# Patient Record
Sex: FEMALE | Race: WHITE | Employment: UNEMPLOYED | URBAN - METROPOLITAN AREA
[De-identification: names, ages, dates, MRNs, and addresses within clinical notes are randomized per-mention and may not be internally consistent; named-entity substitution may affect disease eponyms.]

---

## 2019-04-30 PROBLEM — E66.01 MORBID OBESITY (HCC): Status: ACTIVE | Noted: 2019-04-30

## 2021-08-20 ENCOUNTER — APPOINTMENT (OUTPATIENT)
Dept: CT IMAGING | Age: 56
End: 2021-08-20
Payer: MEDICARE

## 2021-08-20 ENCOUNTER — APPOINTMENT (OUTPATIENT)
Dept: GENERAL RADIOLOGY | Age: 56
End: 2021-08-20
Payer: MEDICARE

## 2021-08-20 ENCOUNTER — HOSPITAL ENCOUNTER (EMERGENCY)
Age: 56
Discharge: ANOTHER HOSPITAL | End: 2021-08-21
Attending: EMERGENCY MEDICINE | Admitting: INTERNAL MEDICINE
Payer: MEDICARE

## 2021-08-20 DIAGNOSIS — G93.89: Primary | ICD-10-CM

## 2021-08-20 DIAGNOSIS — R47.01 EXPRESSIVE APHASIA: ICD-10-CM

## 2021-08-20 PROBLEM — R41.82 AMS (ALTERED MENTAL STATUS): Status: ACTIVE | Noted: 2021-08-20

## 2021-08-20 PROBLEM — I61.9 BRAIN BLEED (HCC): Status: ACTIVE | Noted: 2021-08-20

## 2021-08-20 PROBLEM — E66.01 MORBID OBESITY WITH BMI OF 50.0-59.9, ADULT (HCC): Status: ACTIVE | Noted: 2021-08-20

## 2021-08-20 PROBLEM — I10 ESSENTIAL HYPERTENSION: Status: ACTIVE | Noted: 2021-08-20

## 2021-08-20 PROBLEM — F32.A DEPRESSION: Status: ACTIVE | Noted: 2021-08-20

## 2021-08-20 PROBLEM — D72.829 LEUKOCYTOSIS: Status: ACTIVE | Noted: 2021-08-20

## 2021-08-20 LAB
ALBUMIN SERPL-MCNC: 4.1 G/DL (ref 3.5–5.2)
ALP BLD-CCNC: 43 U/L (ref 35–104)
ALT SERPL-CCNC: 31 U/L (ref 0–32)
ANION GAP SERPL CALCULATED.3IONS-SCNC: 9 MMOL/L (ref 7–16)
AST SERPL-CCNC: 34 U/L (ref 0–31)
BASOPHILS ABSOLUTE: 0.03 E9/L (ref 0–0.2)
BASOPHILS RELATIVE PERCENT: 0.2 % (ref 0–2)
BILIRUB SERPL-MCNC: 0.3 MG/DL (ref 0–1.2)
BUN BLDV-MCNC: 11 MG/DL (ref 6–20)
CALCIUM SERPL-MCNC: 9.9 MG/DL (ref 8.6–10.2)
CHLORIDE BLD-SCNC: 98 MMOL/L (ref 98–107)
CHP ED QC CHECK: YES
CO2: 23 MMOL/L (ref 22–29)
CREAT SERPL-MCNC: 0.6 MG/DL (ref 0.5–1)
EOSINOPHILS ABSOLUTE: 0 E9/L (ref 0.05–0.5)
EOSINOPHILS RELATIVE PERCENT: 0 % (ref 0–6)
GFR AFRICAN AMERICAN: >60
GFR NON-AFRICAN AMERICAN: >60 ML/MIN/1.73
GLUCOSE BLD-MCNC: 137 MG/DL (ref 74–99)
GLUCOSE BLD-MCNC: 138 MG/DL
HCT VFR BLD CALC: 45.4 % (ref 34–48)
HEMOGLOBIN: 14.2 G/DL (ref 11.5–15.5)
IMMATURE GRANULOCYTES #: 0.13 E9/L
IMMATURE GRANULOCYTES %: 1 % (ref 0–5)
LACTIC ACID, SEPSIS: 1.5 MMOL/L (ref 0.5–1.9)
LYMPHOCYTES ABSOLUTE: 1.06 E9/L (ref 1.5–4)
LYMPHOCYTES RELATIVE PERCENT: 7.8 % (ref 20–42)
MCH RBC QN AUTO: 26.6 PG (ref 26–35)
MCHC RBC AUTO-ENTMCNC: 31.3 % (ref 32–34.5)
MCV RBC AUTO: 85.2 FL (ref 80–99.9)
METER GLUCOSE: 138 MG/DL (ref 74–99)
MONOCYTES ABSOLUTE: 0.26 E9/L (ref 0.1–0.95)
MONOCYTES RELATIVE PERCENT: 1.9 % (ref 2–12)
NEUTROPHILS ABSOLUTE: 12.19 E9/L (ref 1.8–7.3)
NEUTROPHILS RELATIVE PERCENT: 89.1 % (ref 43–80)
PDW BLD-RTO: 14.3 FL (ref 11.5–15)
PLATELET # BLD: 240 E9/L (ref 130–450)
PMV BLD AUTO: 10.9 FL (ref 7–12)
POTASSIUM REFLEX MAGNESIUM: 5.4 MMOL/L (ref 3.5–5)
POTASSIUM SERPL-SCNC: 5.2 MMOL/L (ref 3.5–5)
RBC # BLD: 5.33 E12/L (ref 3.5–5.5)
REASON FOR REJECTION: NORMAL
REJECTED TEST: NORMAL
SODIUM BLD-SCNC: 130 MMOL/L (ref 132–146)
TOTAL PROTEIN: 8.4 G/DL (ref 6.4–8.3)
WBC # BLD: 13.7 E9/L (ref 4.5–11.5)

## 2021-08-20 PROCEDURE — 80178 ASSAY OF LITHIUM: CPT

## 2021-08-20 PROCEDURE — 96365 THER/PROPH/DIAG IV INF INIT: CPT

## 2021-08-20 PROCEDURE — 6360000002 HC RX W HCPCS: Performed by: STUDENT IN AN ORGANIZED HEALTH CARE EDUCATION/TRAINING PROGRAM

## 2021-08-20 PROCEDURE — 93005 ELECTROCARDIOGRAM TRACING: CPT | Performed by: STUDENT IN AN ORGANIZED HEALTH CARE EDUCATION/TRAINING PROGRAM

## 2021-08-20 PROCEDURE — 83605 ASSAY OF LACTIC ACID: CPT

## 2021-08-20 PROCEDURE — 82962 GLUCOSE BLOOD TEST: CPT

## 2021-08-20 PROCEDURE — 96375 TX/PRO/DX INJ NEW DRUG ADDON: CPT

## 2021-08-20 PROCEDURE — 70450 CT HEAD/BRAIN W/O DYE: CPT

## 2021-08-20 PROCEDURE — 84132 ASSAY OF SERUM POTASSIUM: CPT

## 2021-08-20 PROCEDURE — 71046 X-RAY EXAM CHEST 2 VIEWS: CPT

## 2021-08-20 PROCEDURE — 99284 EMERGENCY DEPT VISIT MOD MDM: CPT

## 2021-08-20 PROCEDURE — 87040 BLOOD CULTURE FOR BACTERIA: CPT

## 2021-08-20 PROCEDURE — 80053 COMPREHEN METABOLIC PANEL: CPT

## 2021-08-20 PROCEDURE — 85025 COMPLETE CBC W/AUTO DIFF WBC: CPT

## 2021-08-20 RX ORDER — LEVETIRACETAM 15 MG/ML
1500 INJECTION INTRAVASCULAR ONCE
Status: COMPLETED | OUTPATIENT
Start: 2021-08-20 | End: 2021-08-20

## 2021-08-20 RX ORDER — DEXAMETHASONE SODIUM PHOSPHATE 10 MG/ML
10 INJECTION, SOLUTION INTRAMUSCULAR; INTRAVENOUS ONCE
Status: COMPLETED | OUTPATIENT
Start: 2021-08-20 | End: 2021-08-20

## 2021-08-20 RX ORDER — LEVETIRACETAM 15 MG/ML
1500 INJECTION INTRAVASCULAR EVERY 12 HOURS
Status: DISCONTINUED | OUTPATIENT
Start: 2021-08-21 | End: 2021-08-20

## 2021-08-20 RX ORDER — DEXAMETHASONE SODIUM PHOSPHATE 10 MG/ML
6 INJECTION, SOLUTION INTRAMUSCULAR; INTRAVENOUS EVERY 4 HOURS
Status: DISCONTINUED | OUTPATIENT
Start: 2021-08-20 | End: 2021-08-20

## 2021-08-20 RX ADMIN — LEVETIRACETAM 1500 MG: 15 INJECTION INTRAVENOUS at 23:24

## 2021-08-20 RX ADMIN — DEXAMETHASONE SODIUM PHOSPHATE 10 MG: 10 INJECTION, SOLUTION INTRAMUSCULAR; INTRAVENOUS at 23:08

## 2021-08-21 ENCOUNTER — HOSPITAL ENCOUNTER (INPATIENT)
Age: 56
LOS: 10 days | Discharge: HOSPICE | DRG: 025 | End: 2021-08-31
Attending: INTERNAL MEDICINE | Admitting: INTERNAL MEDICINE
Payer: MEDICARE

## 2021-08-21 VITALS
TEMPERATURE: 97.9 F | RESPIRATION RATE: 16 BRPM | OXYGEN SATURATION: 95 % | DIASTOLIC BLOOD PRESSURE: 78 MMHG | HEART RATE: 85 BPM | HEIGHT: 64 IN | WEIGHT: 293 LBS | BODY MASS INDEX: 50.02 KG/M2 | SYSTOLIC BLOOD PRESSURE: 142 MMHG

## 2021-08-21 PROBLEM — E87.1 HYPONATREMIA: Status: ACTIVE | Noted: 2021-08-21

## 2021-08-21 LAB
ANION GAP SERPL CALCULATED.3IONS-SCNC: 10 MMOL/L (ref 7–16)
B.E.: -3.6 MMOL/L (ref -3–3)
BACTERIA: ABNORMAL /HPF
BILIRUBIN URINE: NEGATIVE
BLOOD, URINE: ABNORMAL
BUN BLDV-MCNC: 9 MG/DL (ref 6–20)
CALCIUM IONIZED: 1.3 MMOL/L (ref 1.15–1.33)
CALCIUM SERPL-MCNC: 9.7 MG/DL (ref 8.6–10.2)
CHLORIDE BLD-SCNC: 99 MMOL/L (ref 98–107)
CLARITY: CLEAR
CO2: 25 MMOL/L (ref 22–29)
COHB: 1.4 % (ref 0–1.5)
COLOR: YELLOW
CREAT SERPL-MCNC: 0.5 MG/DL (ref 0.5–1)
CRITICAL: ABNORMAL
DATE ANALYZED: ABNORMAL
DATE OF COLLECTION: ABNORMAL
EKG ATRIAL RATE: 96 BPM
EKG P AXIS: 67 DEGREES
EKG P-R INTERVAL: 178 MS
EKG Q-T INTERVAL: 366 MS
EKG QRS DURATION: 94 MS
EKG QTC CALCULATION (BAZETT): 462 MS
EKG R AXIS: -14 DEGREES
EKG T AXIS: 37 DEGREES
EKG VENTRICULAR RATE: 96 BPM
EPITHELIAL CELLS, UA: ABNORMAL /HPF
GFR AFRICAN AMERICAN: >60
GFR NON-AFRICAN AMERICAN: >60 ML/MIN/1.73
GLUCOSE BLD-MCNC: 176 MG/DL (ref 74–99)
GLUCOSE URINE: NEGATIVE MG/DL
HCO3: 21.9 MMOL/L (ref 22–26)
HCT VFR BLD CALC: 45.4 % (ref 34–48)
HEMOGLOBIN: 14.3 G/DL (ref 11.5–15.5)
HHB: 4 % (ref 0–5)
KETONES, URINE: NEGATIVE MG/DL
LAB: ABNORMAL
LEUKOCYTE ESTERASE, URINE: NEGATIVE
LITHIUM DOSE AMOUNT: ABNORMAL
LITHIUM LEVEL: 0.38 MMOL/L (ref 0.5–1.5)
Lab: ABNORMAL
MAGNESIUM: 2.1 MG/DL (ref 1.6–2.6)
MCH RBC QN AUTO: 26.6 PG (ref 26–35)
MCHC RBC AUTO-ENTMCNC: 31.5 % (ref 32–34.5)
MCV RBC AUTO: 84.4 FL (ref 80–99.9)
METER GLUCOSE: 145 MG/DL (ref 74–99)
METER GLUCOSE: 153 MG/DL (ref 74–99)
METER GLUCOSE: 154 MG/DL (ref 74–99)
METHB: 0.3 % (ref 0–1.5)
MODE: ABNORMAL
NITRITE, URINE: NEGATIVE
O2 SATURATION: 95.9 % (ref 92–98.5)
O2HB: 94.3 % (ref 94–97)
OPERATOR ID: 7278
PATIENT TEMP: 37 C
PCO2: 41.2 MMHG (ref 35–45)
PDW BLD-RTO: 14.3 FL (ref 11.5–15)
PH BLOOD GAS: 7.34 (ref 7.35–7.45)
PH UA: 6.5 (ref 5–9)
PHOSPHORUS: 3.3 MG/DL (ref 2.5–4.5)
PLATELET # BLD: 255 E9/L (ref 130–450)
PMV BLD AUTO: 10.9 FL (ref 7–12)
PO2: 85 MMHG (ref 75–100)
POTASSIUM REFLEX MAGNESIUM: 4.7 MMOL/L (ref 3.5–5)
PROTEIN UA: NEGATIVE MG/DL
RBC # BLD: 5.38 E12/L (ref 3.5–5.5)
RBC UA: ABNORMAL /HPF (ref 0–2)
REASON FOR REJECTION: NORMAL
REASON FOR REJECTION: NORMAL
REJECTED TEST: NORMAL
REJECTED TEST: NORMAL
SODIUM BLD-SCNC: 134 MMOL/L (ref 132–146)
SOURCE, BLOOD GAS: ABNORMAL
SPECIFIC GRAVITY UA: 1.02 (ref 1–1.03)
THB: 15.3 G/DL (ref 11.5–16.5)
TIME ANALYZED: 436
UROBILINOGEN, URINE: 0.2 E.U./DL
WBC # BLD: 12.2 E9/L (ref 4.5–11.5)
WBC UA: ABNORMAL /HPF (ref 0–5)

## 2021-08-21 PROCEDURE — 2580000003 HC RX 258: Performed by: SURGERY

## 2021-08-21 PROCEDURE — 87088 URINE BACTERIA CULTURE: CPT

## 2021-08-21 PROCEDURE — APPSS30 APP SPLIT SHARED TIME 16-30 MINUTES: Performed by: NURSE PRACTITIONER

## 2021-08-21 PROCEDURE — 99221 1ST HOSP IP/OBS SF/LOW 40: CPT | Performed by: FAMILY MEDICINE

## 2021-08-21 PROCEDURE — 51702 INSERT TEMP BLADDER CATH: CPT

## 2021-08-21 PROCEDURE — 84100 ASSAY OF PHOSPHORUS: CPT

## 2021-08-21 PROCEDURE — 83735 ASSAY OF MAGNESIUM: CPT

## 2021-08-21 PROCEDURE — 6360000002 HC RX W HCPCS: Performed by: NURSE PRACTITIONER

## 2021-08-21 PROCEDURE — 85027 COMPLETE CBC AUTOMATED: CPT

## 2021-08-21 PROCEDURE — 2700000000 HC OXYGEN THERAPY PER DAY

## 2021-08-21 PROCEDURE — 82330 ASSAY OF CALCIUM: CPT

## 2021-08-21 PROCEDURE — 82805 BLOOD GASES W/O2 SATURATION: CPT

## 2021-08-21 PROCEDURE — 2500000003 HC RX 250 WO HCPCS: Performed by: NURSE PRACTITIONER

## 2021-08-21 PROCEDURE — 2000000000 HC ICU R&B

## 2021-08-21 PROCEDURE — 2580000003 HC RX 258: Performed by: NURSE PRACTITIONER

## 2021-08-21 PROCEDURE — 82962 GLUCOSE BLOOD TEST: CPT

## 2021-08-21 PROCEDURE — 80048 BASIC METABOLIC PNL TOTAL CA: CPT

## 2021-08-21 PROCEDURE — 81001 URINALYSIS AUTO W/SCOPE: CPT

## 2021-08-21 PROCEDURE — 6370000000 HC RX 637 (ALT 250 FOR IP): Performed by: SURGERY

## 2021-08-21 PROCEDURE — 6360000002 HC RX W HCPCS: Performed by: SURGERY

## 2021-08-21 PROCEDURE — 6360000002 HC RX W HCPCS: Performed by: NEUROLOGICAL SURGERY

## 2021-08-21 RX ORDER — SODIUM CHLORIDE 0.9 % (FLUSH) 0.9 %
5-40 SYRINGE (ML) INJECTION EVERY 12 HOURS SCHEDULED
Status: DISCONTINUED | OUTPATIENT
Start: 2021-08-21 | End: 2021-08-31 | Stop reason: HOSPADM

## 2021-08-21 RX ORDER — ALBUTEROL SULFATE 2.5 MG/3ML
2.5 SOLUTION RESPIRATORY (INHALATION) EVERY 6 HOURS PRN
Status: DISCONTINUED | OUTPATIENT
Start: 2021-08-21 | End: 2021-08-31 | Stop reason: HOSPADM

## 2021-08-21 RX ORDER — LEVETIRACETAM 15 MG/ML
750 INJECTION INTRAVASCULAR EVERY 12 HOURS
Status: DISCONTINUED | OUTPATIENT
Start: 2021-08-21 | End: 2021-08-26

## 2021-08-21 RX ORDER — DIPHENHYDRAMINE HYDROCHLORIDE 50 MG/ML
50 INJECTION INTRAMUSCULAR; INTRAVENOUS
Status: COMPLETED | OUTPATIENT
Start: 2021-08-22 | End: 2021-08-22

## 2021-08-21 RX ORDER — NICOTINE POLACRILEX 4 MG
15 LOZENGE BUCCAL PRN
Status: DISCONTINUED | OUTPATIENT
Start: 2021-08-21 | End: 2021-08-31 | Stop reason: HOSPADM

## 2021-08-21 RX ORDER — DEXTROSE MONOHYDRATE 50 MG/ML
100 INJECTION, SOLUTION INTRAVENOUS PRN
Status: DISCONTINUED | OUTPATIENT
Start: 2021-08-21 | End: 2021-08-31 | Stop reason: HOSPADM

## 2021-08-21 RX ORDER — HYDRALAZINE HYDROCHLORIDE 20 MG/ML
10 INJECTION INTRAMUSCULAR; INTRAVENOUS
Status: DISCONTINUED | OUTPATIENT
Start: 2021-08-21 | End: 2021-08-23

## 2021-08-21 RX ORDER — AMLODIPINE BESYLATE 5 MG/1
5 TABLET ORAL DAILY
Status: DISCONTINUED | OUTPATIENT
Start: 2021-08-21 | End: 2021-08-31 | Stop reason: HOSPADM

## 2021-08-21 RX ORDER — DEXAMETHASONE SODIUM PHOSPHATE 10 MG/ML
10 INJECTION INTRAMUSCULAR; INTRAVENOUS ONCE
Status: COMPLETED | OUTPATIENT
Start: 2021-08-21 | End: 2021-08-21

## 2021-08-21 RX ORDER — DEXAMETHASONE SODIUM PHOSPHATE 4 MG/ML
4 INJECTION, SOLUTION INTRA-ARTICULAR; INTRALESIONAL; INTRAMUSCULAR; INTRAVENOUS; SOFT TISSUE EVERY 6 HOURS
Status: DISCONTINUED | OUTPATIENT
Start: 2021-08-21 | End: 2021-08-31 | Stop reason: HOSPADM

## 2021-08-21 RX ORDER — LITHIUM CARBONATE 300 MG/1
300 CAPSULE ORAL 4 TIMES DAILY
Status: DISCONTINUED | OUTPATIENT
Start: 2021-08-21 | End: 2021-08-31 | Stop reason: HOSPADM

## 2021-08-21 RX ORDER — SODIUM CHLORIDE 0.9 % (FLUSH) 0.9 %
5-40 SYRINGE (ML) INJECTION PRN
Status: DISCONTINUED | OUTPATIENT
Start: 2021-08-21 | End: 2021-08-31 | Stop reason: HOSPADM

## 2021-08-21 RX ORDER — SODIUM CHLORIDE 9 MG/ML
25 INJECTION, SOLUTION INTRAVENOUS PRN
Status: DISCONTINUED | OUTPATIENT
Start: 2021-08-21 | End: 2021-08-31 | Stop reason: HOSPADM

## 2021-08-21 RX ORDER — DEXTROSE MONOHYDRATE 25 G/50ML
12.5 INJECTION, SOLUTION INTRAVENOUS PRN
Status: DISCONTINUED | OUTPATIENT
Start: 2021-08-21 | End: 2021-08-31 | Stop reason: HOSPADM

## 2021-08-21 RX ORDER — BISACODYL 10 MG
10 SUPPOSITORY, RECTAL RECTAL DAILY PRN
Status: DISCONTINUED | OUTPATIENT
Start: 2021-08-21 | End: 2021-08-31 | Stop reason: HOSPADM

## 2021-08-21 RX ORDER — PAROXETINE HYDROCHLORIDE 20 MG/1
40 TABLET, FILM COATED ORAL EVERY MORNING
Status: DISCONTINUED | OUTPATIENT
Start: 2021-08-21 | End: 2021-08-31 | Stop reason: HOSPADM

## 2021-08-21 RX ORDER — SODIUM CHLORIDE 9 MG/ML
INJECTION, SOLUTION INTRAVENOUS CONTINUOUS
Status: DISCONTINUED | OUTPATIENT
Start: 2021-08-21 | End: 2021-08-26

## 2021-08-21 RX ADMIN — HYDRALAZINE HYDROCHLORIDE 10 MG: 20 INJECTION INTRAMUSCULAR; INTRAVENOUS at 04:49

## 2021-08-21 RX ADMIN — LEVETIRACETAM 750 MG: 1500 INJECTION, SOLUTION INTRAVENOUS at 11:38

## 2021-08-21 RX ADMIN — HYDRALAZINE HYDROCHLORIDE 10 MG: 20 INJECTION INTRAMUSCULAR; INTRAVENOUS at 13:07

## 2021-08-21 RX ADMIN — FAMOTIDINE 40 MG: 10 INJECTION INTRAVENOUS at 21:57

## 2021-08-21 RX ADMIN — DEXAMETHASONE SODIUM PHOSPHATE 4 MG: 4 INJECTION, SOLUTION INTRAMUSCULAR; INTRAVENOUS at 21:57

## 2021-08-21 RX ADMIN — SODIUM CHLORIDE: 9 INJECTION, SOLUTION INTRAVENOUS at 08:10

## 2021-08-21 RX ADMIN — DEXAMETHASONE SODIUM PHOSPHATE 10 MG: 10 INJECTION INTRAMUSCULAR; INTRAVENOUS at 14:05

## 2021-08-21 RX ADMIN — PAROXETINE 40 MG: 20 TABLET, FILM COATED ORAL at 08:53

## 2021-08-21 RX ADMIN — DEXAMETHASONE SODIUM PHOSPHATE 4 MG: 4 INJECTION, SOLUTION INTRAMUSCULAR; INTRAVENOUS at 15:20

## 2021-08-21 RX ADMIN — TRIMETHOBENZAMIDE HYDROCHLORIDE 200 MG: 100 INJECTION INTRAMUSCULAR at 17:08

## 2021-08-21 RX ADMIN — AMLODIPINE BESYLATE 5 MG: 5 TABLET ORAL at 08:52

## 2021-08-21 RX ADMIN — Medication 10 ML: at 08:57

## 2021-08-21 RX ADMIN — DEXAMETHASONE SODIUM PHOSPHATE 4 MG: 4 INJECTION, SOLUTION INTRAMUSCULAR; INTRAVENOUS at 08:57

## 2021-08-21 RX ADMIN — LITHIUM CARBONATE 300 MG: 300 CAPSULE ORAL at 08:53

## 2021-08-21 RX ADMIN — HYDRALAZINE HYDROCHLORIDE 10 MG: 20 INJECTION INTRAMUSCULAR; INTRAVENOUS at 08:55

## 2021-08-21 RX ADMIN — TRIMETHOBENZAMIDE HYDROCHLORIDE 200 MG: 100 INJECTION INTRAMUSCULAR at 10:08

## 2021-08-21 RX ADMIN — Medication 10 ML: at 21:59

## 2021-08-21 ASSESSMENT — PAIN SCALES - GENERAL: PAINLEVEL_OUTOF10: 0

## 2021-08-21 NOTE — PROGRESS NOTES
SPEECH LANGUAGE PATHOLOGY    Order acknowledged and chart reviewed- per discussion with patients nurse, she took her pills with water this morning without difficulty and then became nauseous and vomitted. She is inappropriate for a bedside swallow evaluation this morning secondary to persistent nausea. She is scheduled for a craniotomy on Monday. Discussed with nursing to please re-consult speech therapy for a bedside swallowing evaluation following surgery when she is appropriate to participate.         Nathanael [phone]  8/21/2021

## 2021-08-21 NOTE — ED NOTES
EMS report:  Vomiting X 12 hours  Increasing confusion over last month  #20 LAC  Zofran 4 mg IV by EMS  Family in waiting room     Vaishnavi Holly RN  34/28/10 2022

## 2021-08-21 NOTE — PROGRESS NOTES
Patient seen and examined  Aphasic/right hemiparetic/ PERRL  Needs CT's head/chest/abd with contrast    Has iodinie allergy will follow radiology protocol  Plan on frameless stereotactic craniotomy Олег clement  Counseled sister at bedside and reviewed films with her, all questions answered

## 2021-08-21 NOTE — PROGRESS NOTES
Patient admitted to CVICU. Dr. Patricia Dubon NP aware of patient arrival and need for admit orders. Dr. Gerry Valdovinos and aware of patient arrival and consult. Per ED report, ED physician already spoke with him regarding patient. June verbally notified of patient arrival to unit and consult (as she was already on the unit).

## 2021-08-21 NOTE — H&P
Ismael Turner M.D. History and Physical      CHIEF COMPLAINT: Headache, vomiting, expressive aphasia, altered mental status    Reason for Admission: Brain mass    History Obtained From:  patient, spouse    HISTORY OF PRESENT ILLNESS:      The patient is a 60 y.o. female of Jakub Unger MD with significant past medical history of anemia, bipolar 1, depression, type 2 diabetes not on long-term insulin who presents with nausea, vomiting, headache, expressive aphasia. History is obtained mostly from the  at the bedside and another family member. They state over the last 48 hours she has had progressive nausea, vomiting, difficulty interacting and progressive weakness with altered mental status. They state this is very much unlike her. Patient was brought into the ER and underwent CT head which showed a 6.4 x 4.4 space-occupying cystic mass in the left temporal region with marked surrounding edema resulting in mass-effect and midline shift by 5 mm. There was a questionable focal bleed in the area as well. Patient was started on Decadron, admitted to ICU and neurosurgery was consulted. Since coming up to the ICU, her nausea continues, but her interactiveness is improved per the family. All labs personally reviewed   All imaging personally reviewed     Past Medical History:        Diagnosis Date    Anemia     Asthma     [facility] [address]     Depression     [facility] [address]     Hypertension     PMB (postmenopausal bleeding)     Stress incontinence     Thyroid disease     nodules in thyroid- followed by Dr. Yaquelin Good.      Past Surgical History:        Procedure Laterality Date     SECTION      DILATION AND CURETTAGE OF UTERUS  2017    HYSTERECTOMY      TONSILLECTOMY           Medications Prior to Admission:    Medications Prior to Admission: amLODIPine (NORVASC) 5 MG tablet, Take by mouth  hydrOXYzine (ATARAX) 10 MG tablet, vitamin B-12 (CYANOCOBALAMIN) 100 MCG tablet, Take 50 mcg by mouth daily  vitamin D (CHOLECALCIFEROL) 1000 UNIT TABS tablet, Take 1,000 Units by mouth daily  melatonin 3 MG TABS tablet, Take 3 mg by mouth daily  PARoxetine (PAXIL) 30 MG tablet, Take 40 mg by mouth every morning  lithium 300 MG tablet, Take 300 mg by mouth 4 times daily  albuterol (PROVENTIL) (2.5 MG/3ML) 0.083% nebulizer solution, Take 2.5 mg by nebulization every 6 hours as needed for Wheezing    Allergies:  Clindamycin/lincomycin, Iodine, Penicillins, and Sulfa antibiotics    Social History:   TOBACCO:   reports that she has never smoked. She has never used smokeless tobacco.  ETOH:   reports no history of alcohol use. MARITAL STATUS:    OCCUPATION:      Family History:       Problem Relation Age of Onset    Diabetes Mother     Stroke Mother     Diabetes Father     Breast Cancer Paternal Grandmother        REVIEW OF SYSTEMS:    General ROS: positive for  - fatigue  Hematological and Lymphatic ROS: negative  Endocrine ROS: negative  Respiratory ROS: no cough, shortness of breath, or wheezing  Cardiovascular ROS: no chest pain or dyspnea on exertion  Gastrointestinal ROS: no abdominal pain, change in bowel habits, or black or bloody stools  Genito-Urinary ROS: no dysuria, trouble voiding, or hematuria  Neurological ROS: no TIA or stroke symptoms  negative    Vitals:  BP (!) 179/106   Pulse 93   Temp 98.7 °F (37.1 °C) (Oral)   Resp 16   SpO2 98%     PHYSICAL EXAM:  General: Ill-appearing female awake, alert, oriented X 3. Well developed, well nourished, well groomed. HEENT:  Normocephalic, atraumatic. Pupils unequal, right bigger than left, round, reactive to light. No scleral icterus. No conjunctival injection. Normal lips, teeth, and gums. No nasal discharge.   Neck:  Supple, FROM  Heart:  RRR, no murmurs, gallops, rubs, carotid upstroke normal, no carotid bruits  Lungs:  CTA bilaterally, bilat symmetrical expansion, no wheeze, rales, or rhonchi  Abdomen: Bowel sounds present, soft, nontender, no masses, no organomegaly, no peritoneal signs  Extremities:  No clubbing, cyanosis, or edema  Skin:  Warm and dry, no open lesions or rash  Neuro:  Cranial nerves 2-12 intact, no focal deficits  Vascular: Radial and pedal Pulses 2+  Breast: deferred  Rectal: deferred  Genitalia:  deferred      DATA:     Recent Labs     08/20/21 2059 08/21/21  0430   WBC 13.7* 12.2*   HGB 14.2 14.3    255     Recent Labs     08/20/21 2059 08/20/21  2313 08/21/21  0430   *  --  134   K 5.4* 5.2* 4.7   BUN 11  --  9   CREATININE 0.6  --  0.5     Recent Labs     08/20/21 2059   PROT 8.4*     Recent Labs     08/20/21 2059   AST 34*   ALT 31   ALKPHOS 43   BILITOT 0.3     No results for input(s): BNP in the last 72 hours. No results for input(s): CKTOTAL, CKMB, CKMBINDEX, TROPONINI in the last 72 hours. ASSESSMENT:      Principal Problem:    Brain mass  Active Problems:    Brain bleed (HCC)    Essential hypertension    Depression    Leukocytosis    AMS (altered mental status)    Morbid obesity with BMI of 50.0-59.9, adult (HCC)    Hyponatremia  Resolved Problems:    * No resolved hospital problems. *        PLAN:    Brain mass: Neurosurgery consulted: Continue Decadron, nausea control. Craniotomy scheduled 8/23. Essential hypertension: Continue amlodipine, goal SBP <160    Type 2 diabetes, not on long-term insulin: Decadron started for above. Monitor glucose closely. Goal glucose <180. Nausea and vomiting: secondary to above. Continue Decadron, Zofran. Start Haldol if needed. Acute encephalopathy: Secondary to above.     Intraparenchymal hemorrhage  Cerebral edema with mass-effect  Morbid obesity        Valentín Brennan DO  8/21/2021  10:21 AM

## 2021-08-21 NOTE — ED NOTES
Room assignment 3816  N2N [phone]   PAS eta 30-40 min     [facility] Childers[facility] [address]  70/39/36 6845

## 2021-08-21 NOTE — CONSULTS
Palliative Care Department  [phone]  Palliative Care Initial Consult  Provider Wlilylidia Zheng, [address]  [id number]  Hospital Day: 1  Date of Initial Consult: 8/21/2021  Referring Provider: Meño MACKEY  Palliative Medicine was consulted for assistance with: Elizabeth Ansari, family support  Chief Complaint Today:  headache    Brief Summary of Hospital Course:   Medhat Crooks is a 60 y.o. with a medical history of bipolar 1, Type 2 DM, anemia who was admitted on 8/21/2021 from home with a CHIEF COMPLAINT of n/v, weakness, altered mental status, headache. ASSESSMENT/PLAN:       Pertinent Hospital Problems      Brain mass with cerebral edema and mass effect   Intraparenchymal hemorrhage   Acute metabolic encephalopathy   Type 2 DM   Morbid obesity   Essential hypertension    Palliative Care Encounter / Counseling Regarding Goals of Care  Please see detailed goals of care discussion as below   At this time, Medhat Crooks, Does Not have capacity for medical decision-making. Capacity is time limited and situation/question specific   During encounter  Messi Wylie was surrogate medical decision-maker   Outcome of goals of care meeting: proceed with craniotomy   Code status Full Code   Advanced Directives: no POA or living will in [facility]   Surrogate/Legal NOK:  o  Carli Zelaya [phone]  o Sister Cam Racer [phone]  o Other Deliliah Sizer [phone]    Spiritual assessment: no spiritual distress identified  Bereavement and grief: to be determined  Referrals to: none today    SUBJECTIVE:     Details of Conversation:  Chart reviewed. Introduced self and PM to patient,  and sister at bedside. Per  patient has been c/o headache, had moist washcloth on forehead. Per  nausea resolved after receiving medication earlier. Family appreciative of PM support. Their goal is to proceed with craniotomy and find out prognosis and treatment options.   They wish to continue with full code at this time. They also spoke with PM SW. Family denies further needs at this time. Physical Function:  PPS: 30    History Of Present Illness:    Per H&P  \"The patient is a 60 y.o. female of Nish Ackerman MD with significant past medical history of anemia, bipolar 1, depression, type 2 diabetes not on long-term insulin who presents with nausea, vomiting, headache, expressive aphasia. History is obtained mostly from the  at the bedside and another family member. They state over the last 48 hours she has had progressive nausea, vomiting, difficulty interacting and progressive weakness with altered mental status. They state this is very much unlike her. Patient was brought into the ER and underwent CT head which showed a 6.4 x 4.4 space-occupying cystic mass in the left temporal region with marked surrounding edema resulting in mass-effect and midline shift by 5 mm. There was a questionable focal bleed in the area as well. Patient was started on Decadron, admitted to ICU and neurosurgery was consulted. Since coming up to the ICU, her nausea continues, but her interactiveness is improved per the family. \"    Past Medical History:          Diagnosis Date    Anemia     Asthma     [facility] [address]     Depression     Diabetes mellitus ([facility] [address]     Hypertension     PMB (postmenopausal bleeding)     Stress incontinence     Thyroid disease     nodules in thyroid- followed by Dr. Maribel Barron. Past Surgical History:          Procedure Laterality Date     SECTION      DILATION AND CURETTAGE OF UTERUS  2017    HYSTERECTOMY      TONSILLECTOMY         Medications Prior to Admission:      Prior to Admission medications    Medication Sig Start Date End Date Taking?  Authorizing Provider   amLODIPine (NORVASC) 5 MG tablet Take by mouth 09   Historical Provider, MD   hydrOXYzine (ATARAX) 10 MG tablet  19   Historical Provider, MD   vitamin B-12 (CYANOCOBALAMIN) 100 MCG tablet Take 50 mcg by mouth daily    Historical Provider, MD   vitamin D (CHOLECALCIFEROL) 1000 UNIT TABS tablet Take 1,000 Units by mouth daily    Historical Provider, MD   melatonin 3 MG TABS tablet Take 3 mg by mouth daily    Historical Provider, MD   PARoxetine (PAXIL) 30 MG tablet Take 40 mg by mouth every morning    Historical Provider, MD   lithium 300 MG tablet Take 300 mg by mouth 4 times daily    Historical Provider, MD   albuterol (PROVENTIL) (2.5 MG/3ML) 0.083% nebulizer solution Take 2.5 mg by nebulization every 6 hours as needed for Wheezing    Historical Provider, MD       Allergies:  Clindamycin/lincomycin, Iodine, Penicillins, and Sulfa antibiotics    Social History:      The patient currently lives at home with     TOBACCO:   reports that she has never smoked. She has never used smokeless tobacco.  ETOH:   reports no history of alcohol use. Family History:       Reviewed in detail and positive as follows:        Problem Relation Age of Onset    Diabetes Mother     Stroke Mother     Diabetes Father     Breast Cancer Paternal Grandmother        REVIEW OF SYSTEMS:   Pertinent positives as noted in the HPI. All other systems reviewed and negative.     OBJECTIVE:   Prognosis: depends upon goals and unknown    Physical Exam:  BP (!) 154/94   Pulse 96   Temp 98.5 °F (36.9 °C) (Oral)   Resp 24   SpO2 91%     Constitutional:  Obese, appears ill, cheeks flushed, washcloth on forehead  Eyes: no scleral icterus, normal lids, no discharge  ENMT:  Normocephalic, atraumatic, mucosa moist, EOMI  Neck:  trachea midline, no JVD  Lungs:  CTA bilaterally, no audible rhonchi or wheezes noted, respirations unlabored, no retractions  Heart[de-identified]  RRR, distant heart tones, no murmur, rub, or gallop noted during exam  Abd:  Soft, non tender, non distended, bowel sounds present  :  deferred  MSK: sarcopenia absent  Ext:  Moving all extremities, no edema, pulses present  Skin:  Warm and dry, no rashes on visible skin  Psych: non-anxious affect  Neuro:  Sleeping    Objective data reviewed: labs, images, records, medication use, vitals and chart    Labs:     Recent Labs     08/20/21 2059 08/21/21  0430   WBC 13.7* 12.2*   HGB 14.2 14.3   HCT 45.4 45.4    255     Recent Labs     08/20/21 2059 08/20/21  2313 08/21/21  0430   *  --  134   K 5.4* 5.2* 4.7   CL 98  --  99   CO2 23  --  25   BUN 11  --  9   CREATININE 0.6  --  0.5   CALCIUM 9.9  --  9.7   PHOS  --   --  3.3     Recent Labs     08/20/21 2059   AST 34*   ALT 31   BILITOT 0.3   ALKPHOS 43     No results for input(s): INR in the last 72 hours. No results for input(s): Earlis [place] in the last 72 hours. Urinalysis:      Lab Results   Component Value Date    NITRU Negative 08/21/2021    WBCUA 0-1 08/21/2021    WBCUA 6-10 05/19/2017    BACTERIA RARE 08/21/2021    RBCUA 1-3 08/21/2021    RBCUA 0-2 05/19/2017    BLOODU TRACE-INTACT 08/21/2021    SPECGRAV 1.020 08/21/2021    GLUCOSEU Negative 08/21/2021         Discussed patient and the plan of care with the other IDT members: Palliative Medicine IDT Team, Floor Nurse, Patient and Family    Time/Communication  Greater than 50% of time spent, total 30 minutes in counseling and coordination of care at the bedside regarding goals of care, diagnosis and prognosis and see above. Thank you for allowing Palliative Medicine to participate in the care of [facility].

## 2021-08-21 NOTE — ED NOTES
Troponin rejected again d/t hemolysis,  notified and instructed this RN not to reordered     Cruzito Louie RN  68/00/20 8569

## 2021-08-21 NOTE — CONSULTS
SURGICAL CRITICAL CARE  ICU CONSULT NOTE      Date of admission:  2021  Reason for ICU transfer:  Left brain mass    HPI   61yo female presented to [facility] today for AMS and emesis. Patient with expressive aphasia and unable to provide history. History obtained from chart review. She has had progressive neurologic changes and headaches in recent months. Today her symptoms became worse, she was unable to speak in full meaningful sentences and complained of a headache and vomiting. Found to have L-sided brain mass. Neurosurgery consulted and recommended transfer to [facility]. Critical care consulted. Past Medical History:   Diagnosis Date    Anemia     Asthma     [facility] ([facility] [address]     Depression     Diabetes mellitus ([facility] [address]     Hypertension     PMB (postmenopausal bleeding)     Stress incontinence     Thyroid disease     nodules in thyroid- followed by Dr. Behzad Amezquita. Past Surgical History:   Procedure Laterality Date     SECTION      DILATION AND CURETTAGE OF UTERUS  2017    HYSTERECTOMY      TONSILLECTOMY           PHYSICAL EXAM  Vitals:    21 0300   BP: (!) 159/91   Pulse: 93   Resp: 17   Temp:    SpO2: 96%       GENERAL:  NAD. Alert, oriented to self,   HEAD:  Normocephalic. Atraumatic. EYES:   No scleral icterus. PERRL. LUNGS:  No increased work of breathing. CARDIOVASCULAR: Regular rate  ABDOMEN:  Soft, non-distended, non-tender. No guarding, rigidity, rebound. EXTREMITIES:   MAEx4. Atraumatic. No LE edema. SKIN:  Warm and dry  NEUROLOGIC:  GCS 12 E4, M5, V3. Speaks full sentences but nonsensical, suspect expressive aphasia. Moves all extremities but not to command. Localizes pain. ASSESSMENT/PLAN  Neuro:  Left temporoparietal mass - NSG consult. Tylenol for HA. Neuro checks. Hx Bipolar - restart  Home lithium and paxil. Check lithium level. CV:  No acute issues - HTN. Restart home meds.   Monitor hemodynamics, goal SBP <160  Pulm:  No acute issues - Monitor RR, SpO2  GI:  No acute issues. NPO pending NSG recs. Renal:  Hyperkalemia - ?labs hemolyzed at OSH, will repeat. Monitor UOP, Electrolytes. Heme:        No acute issues. Monitor H/H  ID:  Leukocytosis, afebrile. Monitor for SIRS. Endo:  Hx DM. Monitor glucose, SSI.   MSK:  No acute issues. PT/OT when able. Bowel Regimen: Senna/Colace  DVT PPx:  PCDs  GI PPx:  diet   Glucose Protocol: SSI  Mouth/Eye Care: Per patient   [place]:   Keep in place for critical care monitoring of fluid balance.   CVC Sites:  n/a  Ancillary Consults: IM (admitting), Neurosurgery  Family Update: As available       Code status:  Full    Disposition:  Continue ICU care      Bibiana Estrada DO  Resident, PGY-3  8/21/2021  4:24 AM

## 2021-08-21 NOTE — PROGRESS NOTES
Patient's sister, Griselda Hannah, just called the unit stating that she was given permission from the patient's spouse to be her decision maker. I informed her that technically her  is her NOK and that she should speak with Social Work/Case Management in regards to this and moving forward with POA paperwork.

## 2021-08-21 NOTE — PROGRESS NOTES
Palliative Medicine Social Work     Patient Name: Jak Reyes  Age: 60 y.o.  Status: No    Decision-maker: Patient's  Dean Infante [phone] is next of kin decision-maker. Additional Support: Patient's sisters, Tracy Rinaldi [phone], Elisha Galvan (here currently from [place]) and [place], [place]) are all very involved and supportive. David Ambrosio, son of patient and Nelia Villar, lives close by to them and is also supportive. Minor Children: None noted. Advanced Directives: None known. Confirm Code Status: Full. Discussed today, provided brochure. Family advised that per their knowledge, patient has never expressed wishes other than to remain full code. Current Goals of Care: live longer, improve or maintain function/ quality of life, remain at home and continue current management    Mental Health History: History of Bipolar I.    Substance Abuse: None noted. Indications of Abuse/Neglect: No concerns noted. Financial Concerns: No concerns expressed. Living Situation: Patient and , Nelia Villar, live together at home. Patient has had increasing difficulty over the past few months and now has expressive aphasia. She was independent prior to this. Physical Care Needs Met: Yes    Emotional Needs Met: time spent exploring family's thoughts and feelings, providing support through active listening and validation of feelings. Future Care Needs/Goals/Anticipatory Guidance: Continue support with decision-making as more is known about patient's diagnosis. Referral Needs: None at this time. Assessment: W for Palliative Medicine met at bedside with patient's , Nelia Villar, and sister, Elisha Galvan. Patient's son, David Ambrosio, will be arriving shortly. Family has been updated by physicians and are hoping to have more answers after procedure on Monday. Family is appropriately tearful. LSW reviewed role of Palliative Medicine and provided brochure with contact information.  Family is not aware that patient had ever completed Durable Power of  or Living Will paperwork. Rolla Dakin is aware he is legal next of kin decision-maker, he advised he can be contacted at any time. He is semi-retired but can be reached if he is at work. He will have support from patient's sisters and their son in making decisions as well. Sister Divya Quiroz asked about possible need for additional care at home. LSW discussed that more will be known about patient's needs. LSW discussed what home health care offers versus possible need for private caregivers and/or services through [address] [place] [address] Drive, [phone] or another waiver. Family is not aware that patient has long term care insurance. They will continue to work with unit  or  regarding these needs. Psychosocial support provided. Plan: Continue to provide support to family as available.

## 2021-08-21 NOTE — ED PROVIDER NOTES
Cranial Nerves: Dysarthria present. No cranial nerve deficit or facial asymmetry. Sensory: No sensory deficit. Motor: No weakness. Psychiatric:         Mood and Affect: Mood normal.         Behavior: Behavior normal. Behavior is not agitated. Thought Content: Thought content normal.         Cognition and Memory: Cognition is impaired. Judgment: Judgment normal.          Procedures     EKG: This EKG is signed and interpreted by me. Rate: 96  Rhythm: Sinus  Interpretation: no acute changes; sinus rhythm, no ST elevations or T wave inversions. Comparison: no previous EKG available      MDM  Number of Diagnoses or Management Options  Expressive aphasia  Mass of left temporoparietal region  Diagnosis management comments: This is a 47-year-old female who presents today via EMS from home after her  called for altered mental status and emesis. She is unable to provide me with a history due to expressive aphasia. She is disoriented. Her sister was found in the waiting room and she states that the patient has had worsening neurological decline for the past 2 months associated with headaches and ataxia. She was supposed to see an outpatient neurologist in a couple weeks but today her neurological condition became significantly worse. On exam she has focal neurological deficit, no facial droop, normal strength but she has significant expressive aphasia and responds to most questions with \"water\". CT of the head showed a left temporoparietal mass with edema and questionable focal hemorrhage. Spoke to Dr. Antony Argueta, neurosurgeon. Patient will be transferred to neuro ICU. I started her on Keppra and Decadron. Additional workup was obtained including CBC, CMP, Lithium level and lactate which were all within normal limits. She is stable for transfer.         Amount and/or Complexity of Data Reviewed  Decide to obtain previous medical records or to obtain history from someone other than the patient: yes         ED Course as of Aug 21 0040   Fri Aug 20, 2021   2104 Lithium Dose Amount: Unknown [NS]   5261 Spoke to radiologist. Pt has a left temporoparietal mass with questionable focal bleed    [NS]   2235 Spoke to Dr. Aashish Villanueva, neurosurgery. Pt will be transferred to neuro ICU started on Donah Awkward     [NS]   2307 Spoke with Neuro ICU. Pt will be admitted. [NS]   4913 Spoke to April. Inpatient under Dr. Sears Jazmine for brain mass. [NS]      ED Course User Index  [NS] Rashel Oden DO     ED Course as of Aug 21 0040   Fri Aug 20, 2021   2104 Lithium Dose Amount: Unknown [NS]   8580 Spoke to radiologist. Pt has a left temporoparietal mass with questionable focal bleed    [NS]   2235 Spoke to Dr. Aashish Villanueva, neurosurgery. Pt will be transferred to neuro ICU started on Donah Awkward     [NS]   2307 Spoke with Neuro ICU. Pt will be admitted. [NS]   1200 Spoke to April. Inpatient under Dr. Sears Jazmine for brain mass. [NS]      ED Course User Index  [NS] Rashel Oden DO       --------------------------------------------- PAST HISTORY ---------------------------------------------  Past Medical History:  has a past medical history of Anemia, Asthma, Bipolar 1 disorder ([facility] Depression, Diabetes mellitus ([facility] Hypertension, PMB (postmenopausal bleeding), Stress incontinence, and Thyroid disease. Past Surgical History:  has a past surgical history that includes  section; Tonsillectomy; Dilation and curettage of uterus (2017); and Hysterectomy. Social History:  reports that she has never smoked. She has never used smokeless tobacco. She reports that she does not drink alcohol. Family History: family history includes Breast Cancer in her paternal grandmother; Diabetes in her father and mother; Stroke in her mother. The patients home medications have been reviewed.     Allergies: Clindamycin/lincomycin, Iodine, Penicillins, and Sulfa antibiotics    -------------------------------------------------- RESULTS -------------------------------------------------    Lab  Results for orders placed or performed during the hospital encounter of 08/20/21   CBC Auto Differential   Result Value Ref Range    WBC 13.7 (H) 4.5 - 11.5 E9/L    RBC 5.33 3.50 - 5.50 E12/L    Hemoglobin 14.2 11.5 - 15.5 g/dL    Hematocrit 45.4 34.0 - 48.0 %    MCV 85.2 80.0 - 99.9 fL    MCH 26.6 26.0 - 35.0 pg    MCHC 31.3 (L) 32.0 - 34.5 %    RDW 14.3 11.5 - 15.0 fL    Platelets 126 723 - 298 E9/L    MPV 10.9 7.0 - 12.0 fL    Neutrophils % 89.1 (H) 43.0 - 80.0 %    Immature Granulocytes % 1.0 0.0 - 5.0 %    Lymphocytes % 7.8 (L) 20.0 - 42.0 %    Monocytes % 1.9 (L) 2.0 - 12.0 %    Eosinophils % 0.0 0.0 - 6.0 %    Basophils % 0.2 0.0 - 2.0 %    Neutrophils Absolute 12.19 (H) 1.80 - 7.30 E9/L    Immature Granulocytes # 0.13 E9/L    Lymphocytes Absolute 1.06 (L) 1.50 - 4.00 E9/L    Monocytes Absolute 0.26 0.10 - 0.95 E9/L    Eosinophils Absolute 0.00 (L) 0.05 - 0.50 E9/L    Basophils Absolute 0.03 0.00 - 0.20 E9/L   Comprehensive Metabolic Panel w/ Reflex to MG   Result Value Ref Range    Sodium 130 (L) 132 - 146 mmol/L    Potassium reflex Magnesium 5.4 (H) 3.5 - 5.0 mmol/L    Chloride 98 98 - 107 mmol/L    CO2 23 22 - 29 mmol/L    Anion Gap 9 7 - 16 mmol/L    Glucose 137 (H) 74 - 99 mg/dL    BUN 11 6 - 20 mg/dL    CREATININE 0.6 0.5 - 1.0 mg/dL    GFR Non-African American >60 >=60 mL/min/1.73    GFR African American >60     Calcium 9.9 8.6 - 10.2 mg/dL    Total Protein 8.4 (H) 6.4 - 8.3 g/dL    Albumin 4.1 3.5 - 5.2 g/dL    Total Bilirubin 0.3 0.0 - 1.2 mg/dL    Alkaline Phosphatase 43 35 - 104 U/L    ALT 31 0 - 32 U/L    AST 34 (H) 0 - 31 U/L   Lactate, Sepsis   Result Value Ref Range    Lactic Acid, Sepsis 1.5 0.5 - 1.9 mmol/L   Lithium level   Result Value Ref Range    Lithium Dose Amount Unknown    SPECIMEN REJECTION   Result Value Ref Range    Rejected Test SDS2, TRP5     Reason for Rejection see below    Potassium   Result Value Ref Range    Potassium 5.2 (H) 3.5 - 5.0 mmol/L   SPECIMEN REJECTION   Result Value Ref Range    Rejected Test trp5     Reason for Rejection see below    POCT Glucose   Result Value Ref Range    Glucose 138 mg/dL    QC OK? yes    POCT Glucose   Result Value Ref Range    Meter Glucose 138 (H) 74 - 99 mg/dL   EKG 12 Lead   Result Value Ref Range    Ventricular Rate 96 BPM    Atrial Rate 96 BPM    P-R Interval 178 ms    QRS Duration 94 ms    Q-T Interval 366 ms    QTc Calculation (Bazett) 462 ms    P Axis 67 degrees    R Axis -14 degrees    T Axis 37 degrees       Radiology  XR CHEST (2 VW)   Final Result   Mild cardiomegaly and pulmonary vascular congestion         CT HEAD WO CONTRAST   Final Result   6.4 x 4.4 cm space-occupying cystic mass in the left temporoparietal region   with marked surrounding edema, resulting in mass effect and midline shift to   the right by 5 mm. Question focal bleed in the posterolateral margin of this   mass. Recommend contrast enhanced brain MR and neurologic/neuro surgical   consultation. Critical results were discussed by Dr. Franco Ellison with Dr. Christine Mar On 8/20/2021   at 22:29.                   ------------------------- NURSING NOTES AND VITALS REVIEWED ---------------------------  Date / Time Roomed:  8/20/2021  7:56 PM  ED Bed Assignment:  15/15    The nursing notes within the ED encounter and vital signs as below have been reviewed. Patient Vitals for the past 24 hrs:   BP Temp Pulse Resp SpO2 Height Weight   08/20/21 2329 (!) 143/81 -- 93 16 94 % -- --   08/20/21 2044 (!) 189/92 98 °F (36.7 °C) 87 16 94 % 5' 4\" (1.626 m) (!) 332 lb (150.6 kg)   08/20/21 2040 (!) 189/92 -- -- -- -- -- --       Oxygen Saturation Interpretation: Normal      ------------------------------------------ PROGRESS NOTES ------------------------------------------  Re-evaluation(s):  Time: 2200.   Patients symptoms show no change  Repeat physical

## 2021-08-21 NOTE — PROGRESS NOTES
@[id number] access center notified of transfer to [facility] ICU  Dr. Chance Thayer spoke with Dr. Amelia Parra neurosurgery  Brain Mass  @[phone] Dr Chance Thayer spoke with Dr. Cam Ga ICU accepting

## 2021-08-21 NOTE — PROGRESS NOTES
On admission: patient intermittently able to follow commands. She wiggles toes, but unable to give \"thumbs up\". When attempting to assess orientation, patient unable to verbalize her name & , but does respond to her name being called. Pupils are equal, round, and sluggishly reactive at 5 mm. She does attempt to verbalize some when prompted.

## 2021-08-21 NOTE — FLOWSHEET NOTE
Patient arrived to unit on RA; spO2 was 87% with appropriate waveform. Patient placed on 4L NC; spO2 currently 96%.

## 2021-08-21 NOTE — PROGRESS NOTES
Surgical  Neuro Science Intensive Care Unit  Critical Care  Daily Progress Note 8/21/2021    Date of Admission: 08/21/2021    CC: Follow up for AMS and brain mass    HOSPITAL COURSE/OVERNIGHT EVENTS:    08/20  Presented to [facility] ED for AMS & vomiting. Expressive aphasia. Has had progressive neurological changes and headaches in past months. Symptoms worsened. H CT showed left temporoparietal cystic mass with surrounding edema and midline shift of 5 mm. Transferred to [facility] for ongoing care. 08/21  Admitted to ICU. No issues overnight. Passed a swallow. PHYSICAL EXAM:  BP (!) 152/85   Pulse 92   Temp 99.1 °F (37.3 °C) (Oral)   Resp 17   SpO2 97%      No intake or output data in the 24 hours ending 08/21/21 0809    General appearance:  Comfortable. Pain Description: none    NEUROLOGIC:   RASS Score:  0  GCS:  13  3 - Opens eyes to loud noise or command   6 - Follows simple motor commands  4 - Seems confused, disoriented     Did not answer questions as to name or place. Did answer yes/no questions. Pupil size:  Left 3 mm  Right 3 mm  Pupil reaction: Yes   PERRLA  Wiggles fingers: Left   Yes  Right Yes  Hand grasp:   Left: Yes     Right    Yes  Wiggles toes: Left   Yes Right  Yes  Plantar flexion: Left  Yes  Right   Yes  Facial droop:   none   Speech:  clear    CONSTITUTIONAL: No acute distress, lying in hospital bed. CARDIOVASCULAR: S1 S2, regular rate, regular rhythm, no murmur/gallop/rub. Monitor: NSR. PULMONARY: Bilaterally clear. No rhonchi/rales/wheezes, no use of accessory muscles. O2 at 3 L nc. RENAL:  Arciniega to gravity, clear yellow urine. Fluid balance for previous 24 hours: none  ABDOMEN: Soft, nontender, nondistended, nontympanic, normal bowel sounds. NPO. No reported nausea or vomiting. Passed bedside swallow. MUSCULOSKELETAL:  Moves all extremities to command weakly. SKIN/EXTREMITIES: No rashes/ecchymosis, no edema/clubbing, warm/dry, good capillary refill. LINES:  Peripheral     Recent Labs     08/20/21 2059 08/21/21  0430   WBC 13.7* 12.2*   HGB 14.2 14.3   HCT 45.4 45.4   MCV 85.2 84.4    255       Recent Labs     08/20/21 2059 08/20/21  2313 08/21/21  0430   *  --  134   K 5.4* 5.2* 4.7   CO2 23  --  25   PHOS  --   --  3.3   BUN 11  --  9   CREATININE 0.6  --  0.5       Recent Labs     08/20/21 2059   PROT 8.4*     ASSESSMENT/PLAN:     Principal Problem:    Brain mass  Active Problems:    Brain bleed (HCC)    Essential hypertension    Depression    Leukocytosis    AMS (altered mental status)    Morbid obesity with BMI of 50.0-59.9, adult (HCC)    Hyponatremia  Resolved Problems:    * No resolved hospital problems. *    Neuro: Left temporal parietal cystic mass with edema & midline shift. Hx of bipolar disorder & depression. Monitor neuro status. Neurosurgery following. Keppra for seizure prophylaxis. Lithium. Paxil. Decadron. HCT with contrast pending. CV:  No acute issues. Hx of HTN    Monitor hemodynamics. BP goal systolic less than 475 mm Hg. PRN hydralzine & labetalol. Norvasc. Pulm: No acute issues. Monitor RR & SpO2. O2 as needed. PRN  Albuterol. Encourage cough, SMI  & deep breathing. CT chest with contrast pending. GI: No acute issues. Morbid obesity. BMI 56. .    Monitor bowel function. NPO. Tigan. CT abdomen & pelvis with contrast pending. Renal:  No acute issues. Hx of stress incontinence. Monitor BUN & Cr, electrolytes & replace as needed. Monitor I & O. Arciniega. ID: Leukocytosis. Endocrine: Hyperglycemia. Hx of diabetes & hypothyroid nodules. Monitor BS.    ISS.       MSK:  Deconditioned.   ROM. Turn & reposition. PT & OT pending recommendations. Monitor for skin breakdown. Heme: No acute issues. Hx of anemia   Monitor CBC. Other:  Craniotomy tentatively scheduled on Monday. Unable to get MRI due to her body habitus.   She needs to have workup to include HCT with contrast, CT chest with contrast & CT abdomen & pelvis with IV contrast.  Will start her on a 13 hour prep with steroids & benadryl. Bowel regime: Bisacodyl   Pain control/Sedation:    DVT prophylaxis: SCD   GI prophylaxis: Protonix  Glucose protocol:  ISS  Arciniega: Keep in place for critical care monitoring of fluid balance. Ancillary consults:  Medicine. Neurosurgery. Palliative care. Critical care. Patient/Family update: Will update when available. Code status:   Full code. Disposition: Continue ICU.     Electronically signed by Eric Chen RN MSN APRN-NP [facility] NP  CCNS CCRN 8/21/2021 9:55 AM

## 2021-08-22 ENCOUNTER — ANESTHESIA EVENT (OUTPATIENT)
Dept: OPERATING ROOM | Age: 56
DRG: 025 | End: 2021-08-22
Payer: MEDICARE

## 2021-08-22 ENCOUNTER — APPOINTMENT (OUTPATIENT)
Dept: CT IMAGING | Age: 56
DRG: 025 | End: 2021-08-22
Attending: INTERNAL MEDICINE
Payer: MEDICARE

## 2021-08-22 LAB
ABO/RH: NORMAL
ALBUMIN SERPL-MCNC: 4 G/DL (ref 3.5–5.2)
ALP BLD-CCNC: 33 U/L (ref 35–104)
ALT SERPL-CCNC: 21 U/L (ref 0–32)
ANION GAP SERPL CALCULATED.3IONS-SCNC: 8 MMOL/L (ref 7–16)
ANTIBODY SCREEN: NORMAL
AST SERPL-CCNC: 13 U/L (ref 0–31)
B.E.: -0.4 MMOL/L (ref -3–3)
BILIRUB SERPL-MCNC: 0.2 MG/DL (ref 0–1.2)
BUN BLDV-MCNC: 16 MG/DL (ref 6–20)
CALCIUM IONIZED: 1.37 MMOL/L (ref 1.15–1.33)
CALCIUM SERPL-MCNC: 9.6 MG/DL (ref 8.6–10.2)
CHLORIDE BLD-SCNC: 102 MMOL/L (ref 98–107)
CO2: 25 MMOL/L (ref 22–29)
COHB: 1 % (ref 0–1.5)
CREAT SERPL-MCNC: 0.6 MG/DL (ref 0.5–1)
CRITICAL: ABNORMAL
DATE ANALYZED: ABNORMAL
DATE OF COLLECTION: ABNORMAL
GFR AFRICAN AMERICAN: >60
GFR NON-AFRICAN AMERICAN: >60 ML/MIN/1.73
GLUCOSE BLD-MCNC: 160 MG/DL (ref 74–99)
HCO3: 26.1 MMOL/L (ref 22–26)
HCT VFR BLD CALC: 44.3 % (ref 34–48)
HEMOGLOBIN: 14 G/DL (ref 11.5–15.5)
HHB: 1.7 % (ref 0–5)
INR BLD: 1.1
LAB: ABNORMAL
Lab: ABNORMAL
MAGNESIUM: 2.3 MG/DL (ref 1.6–2.6)
MCH RBC QN AUTO: 26.6 PG (ref 26–35)
MCHC RBC AUTO-ENTMCNC: 31.6 % (ref 32–34.5)
MCV RBC AUTO: 84.2 FL (ref 80–99.9)
METER GLUCOSE: 131 MG/DL (ref 74–99)
METER GLUCOSE: 149 MG/DL (ref 74–99)
METER GLUCOSE: 152 MG/DL (ref 74–99)
METER GLUCOSE: 153 MG/DL (ref 74–99)
METER GLUCOSE: 154 MG/DL (ref 74–99)
METER GLUCOSE: 173 MG/DL (ref 74–99)
METHB: 0.5 % (ref 0–1.5)
MODE: ABNORMAL
O2 SATURATION: 98.3 % (ref 92–98.5)
O2HB: 96.8 % (ref 94–97)
OPERATOR ID: 2067
PATIENT TEMP: 37 C
PCO2: 49.8 MMHG (ref 35–45)
PDW BLD-RTO: 14.8 FL (ref 11.5–15)
PH BLOOD GAS: 7.34 (ref 7.35–7.45)
PHOSPHORUS: 3.2 MG/DL (ref 2.5–4.5)
PLATELET # BLD: 247 E9/L (ref 130–450)
PMV BLD AUTO: 11.9 FL (ref 7–12)
PO2: 126 MMHG (ref 75–100)
POTASSIUM SERPL-SCNC: 4.5 MMOL/L (ref 3.5–5)
PROTHROMBIN TIME: 12.1 SEC (ref 9.3–12.4)
RBC # BLD: 5.26 E12/L (ref 3.5–5.5)
REASON FOR REJECTION: NORMAL
REJECTED TEST: NORMAL
SODIUM BLD-SCNC: 135 MMOL/L (ref 132–146)
SOURCE, BLOOD GAS: ABNORMAL
THB: 14.9 G/DL (ref 11.5–16.5)
TIME ANALYZED: 529
TOTAL PROTEIN: 7.3 G/DL (ref 6.4–8.3)
WBC # BLD: 11.5 E9/L (ref 4.5–11.5)

## 2021-08-22 PROCEDURE — 2580000003 HC RX 258: Performed by: NURSE PRACTITIONER

## 2021-08-22 PROCEDURE — 82805 BLOOD GASES W/O2 SATURATION: CPT

## 2021-08-22 PROCEDURE — 82330 ASSAY OF CALCIUM: CPT

## 2021-08-22 PROCEDURE — 72125 CT NECK SPINE W/O DYE: CPT

## 2021-08-22 PROCEDURE — 6370000000 HC RX 637 (ALT 250 FOR IP): Performed by: SURGERY

## 2021-08-22 PROCEDURE — 85027 COMPLETE CBC AUTOMATED: CPT

## 2021-08-22 PROCEDURE — 86901 BLOOD TYPING SEROLOGIC RH(D): CPT

## 2021-08-22 PROCEDURE — 36415 COLL VENOUS BLD VENIPUNCTURE: CPT

## 2021-08-22 PROCEDURE — 6360000004 HC RX CONTRAST MEDICATION: Performed by: RADIOLOGY

## 2021-08-22 PROCEDURE — 82962 GLUCOSE BLOOD TEST: CPT

## 2021-08-22 PROCEDURE — 2500000003 HC RX 250 WO HCPCS: Performed by: NURSE PRACTITIONER

## 2021-08-22 PROCEDURE — 87081 CULTURE SCREEN ONLY: CPT

## 2021-08-22 PROCEDURE — 6360000002 HC RX W HCPCS: Performed by: NURSE PRACTITIONER

## 2021-08-22 PROCEDURE — APPSS15 APP SPLIT SHARED TIME 0-15 MINUTES: Performed by: NURSE PRACTITIONER

## 2021-08-22 PROCEDURE — 83735 ASSAY OF MAGNESIUM: CPT

## 2021-08-22 PROCEDURE — 2000000000 HC ICU R&B

## 2021-08-22 PROCEDURE — 80053 COMPREHEN METABOLIC PANEL: CPT

## 2021-08-22 PROCEDURE — 74177 CT ABD & PELVIS W/CONTRAST: CPT

## 2021-08-22 PROCEDURE — 2700000000 HC OXYGEN THERAPY PER DAY

## 2021-08-22 PROCEDURE — 86900 BLOOD TYPING SEROLOGIC ABO: CPT

## 2021-08-22 PROCEDURE — 6360000002 HC RX W HCPCS: Performed by: SURGERY

## 2021-08-22 PROCEDURE — 70460 CT HEAD/BRAIN W/DYE: CPT

## 2021-08-22 PROCEDURE — 84100 ASSAY OF PHOSPHORUS: CPT

## 2021-08-22 PROCEDURE — 86850 RBC ANTIBODY SCREEN: CPT

## 2021-08-22 PROCEDURE — 71260 CT THORAX DX C+: CPT

## 2021-08-22 PROCEDURE — 6360000002 HC RX W HCPCS: Performed by: NEUROLOGICAL SURGERY

## 2021-08-22 PROCEDURE — 85610 PROTHROMBIN TIME: CPT

## 2021-08-22 RX ORDER — SODIUM CHLORIDE 0.9 % (FLUSH) 0.9 %
10 SYRINGE (ML) INJECTION ONCE
Status: DISCONTINUED | OUTPATIENT
Start: 2021-08-22 | End: 2021-08-31 | Stop reason: HOSPADM

## 2021-08-22 RX ORDER — DEXAMETHASONE SODIUM PHOSPHATE 10 MG/ML
10 INJECTION INTRAMUSCULAR; INTRAVENOUS ONCE
Status: COMPLETED | OUTPATIENT
Start: 2021-08-22 | End: 2021-08-22

## 2021-08-22 RX ORDER — SCOLOPAMINE TRANSDERMAL SYSTEM 1 MG/1
1 PATCH, EXTENDED RELEASE TRANSDERMAL
Status: DISCONTINUED | OUTPATIENT
Start: 2021-08-22 | End: 2021-08-31 | Stop reason: HOSPADM

## 2021-08-22 RX ORDER — DIPHENHYDRAMINE HYDROCHLORIDE 50 MG/ML
25 INJECTION INTRAMUSCULAR; INTRAVENOUS 2 TIMES DAILY PRN
Status: DISCONTINUED | OUTPATIENT
Start: 2021-08-22 | End: 2021-08-31 | Stop reason: HOSPADM

## 2021-08-22 RX ADMIN — FAMOTIDINE 40 MG: 10 INJECTION INTRAVENOUS at 09:05

## 2021-08-22 RX ADMIN — PAROXETINE 40 MG: 20 TABLET, FILM COATED ORAL at 07:47

## 2021-08-22 RX ADMIN — Medication 10 ML: at 07:44

## 2021-08-22 RX ADMIN — DEXAMETHASONE SODIUM PHOSPHATE 4 MG: 4 INJECTION, SOLUTION INTRAMUSCULAR; INTRAVENOUS at 03:57

## 2021-08-22 RX ADMIN — TRIMETHOBENZAMIDE HYDROCHLORIDE 200 MG: 100 INJECTION INTRAMUSCULAR at 09:05

## 2021-08-22 RX ADMIN — DIPHENHYDRAMINE HYDROCHLORIDE 50 MG: 50 INJECTION, SOLUTION INTRAMUSCULAR; INTRAVENOUS at 09:04

## 2021-08-22 RX ADMIN — HYDRALAZINE HYDROCHLORIDE 10 MG: 20 INJECTION INTRAMUSCULAR; INTRAVENOUS at 11:09

## 2021-08-22 RX ADMIN — DEXAMETHASONE SODIUM PHOSPHATE 4 MG: 4 INJECTION, SOLUTION INTRAMUSCULAR; INTRAVENOUS at 20:41

## 2021-08-22 RX ADMIN — LEVETIRACETAM 750 MG: 1500 INJECTION, SOLUTION INTRAVENOUS at 23:53

## 2021-08-22 RX ADMIN — LEVETIRACETAM 750 MG: 1500 INJECTION, SOLUTION INTRAVENOUS at 11:10

## 2021-08-22 RX ADMIN — DEXAMETHASONE SODIUM PHOSPHATE 4 MG: 4 INJECTION, SOLUTION INTRAMUSCULAR; INTRAVENOUS at 09:04

## 2021-08-22 RX ADMIN — IOPAMIDOL 90 ML: 755 INJECTION, SOLUTION INTRAVENOUS at 10:26

## 2021-08-22 RX ADMIN — DEXAMETHASONE SODIUM PHOSPHATE 4 MG: 4 INJECTION, SOLUTION INTRAMUSCULAR; INTRAVENOUS at 16:02

## 2021-08-22 RX ADMIN — LEVETIRACETAM 750 MG: 1500 INJECTION, SOLUTION INTRAVENOUS at 00:05

## 2021-08-22 RX ADMIN — DEXAMETHASONE SODIUM PHOSPHATE 10 MG: 10 INJECTION INTRAMUSCULAR; INTRAVENOUS at 10:00

## 2021-08-22 RX ADMIN — Medication 10 ML: at 20:57

## 2021-08-22 RX ADMIN — AMLODIPINE BESYLATE 5 MG: 5 TABLET ORAL at 07:47

## 2021-08-22 RX ADMIN — LITHIUM CARBONATE 300 MG: 300 CAPSULE ORAL at 07:47

## 2021-08-22 ASSESSMENT — PAIN SCALES - GENERAL
PAINLEVEL_OUTOF10: 0

## 2021-08-22 NOTE — PROGRESS NOTES
Subjective:    More alert this morning. Nausea somewhat improved. Denies any weakness, numbness, tingling. Still having some dizziness. Objective:    BP (!) 152/82   Pulse 77   Temp 98.1 °F (36.7 °C) (Temporal)   Resp 25   SpO2 97%     In: 850 [I.V.:800]  Out: 840   In: 850   Out: 840 [Urine:840]    General appearance: NAD, conversant  HEENT: AT/NC, MMM  Neck: FROM, supple  Lungs: Clear to auscultation  CV: RRR, no MRGs  Vasc: Radial pulses 2+  Abdomen: Soft, non-tender; no masses or HSM  Extremities: No peripheral edema or digital cyanosis  Skin: no rash, lesions or ulcers  Psych: Alert. Unable to assess orientation. Neuro: Alert and interactive. No focal neurologic deficit. Babbling speech, expressive aphasia. Recent Labs     08/20/21 2059 08/21/21  0430 08/22/21  0520   WBC 13.7* 12.2* 11.5   HGB 14.2 14.3 14.0   HCT 45.4 45.4 44.3    255 247       Recent Labs     08/20/21 2059 08/20/21  2313 08/21/21  0430 08/22/21  0520   *  --  134 135   K 5.4* 5.2* 4.7 4.5   CL 98  --  99 102   CO2 23  --  25 25   BUN 11  --  9 16   CREATININE 0.6  --  0.5 0.6   CALCIUM 9.9  --  9.7 9.6       Assessment:    Principal Problem:    Brain mass  Active Problems:    Brain bleed (HCC)    Essential hypertension    Depression    Leukocytosis    AMS (altered mental status)    Morbid obesity with BMI of 50.0-59.9, adult (HCC)    Hyponatremia  Resolved Problems:    * No resolved hospital problems. *      Plan:    Brain mass: Neurosurgery consulted: Continue Decadron, nausea control. Craniotomy scheduled 8/23.     Essential hypertension: Continue amlodipine, goal SBP <160. Hydralazine available for PRN.     Type 2 diabetes, not on long-term insulin: Decadron started for above. Monitor glucose closely. Goal glucose <180.     Nausea and vomiting: secondary to above. Continue Decadron, Zofran.   Start Haldol if needed.     Acute encephalopathy: Secondary to above.     Intraparenchymal hemorrhage  Cerebral edema with mass-effect  Morbid obesity           Don Ruiz DO  10:08 AM  8/22/2021

## 2021-08-22 NOTE — PLAN OF CARE
Problem: Verbal Communication - Impaired:  Goal: Functional communication will improve  Description: Functional communication will improve  Outcome: Met This Shift     Problem: Pain:  Goal: Recognizes and communicates pain  Description: Recognizes and communicates pain  Outcome: Met This Shift     Problem: Injury - Risk of, Healthcare-Acquired Condition:  Goal: Will remain free from falls  Description: Will remain free from falls  Outcome: Met This Shift     Problem: Swallowing - Impaired:  Goal: Able to swallow without choking  Description: Able to swallow without choking  Outcome: Met This Shift     Problem: Respiratory Function - Compromised:  Goal: Ability to maintain a clear airway will improve  Description: Ability to maintain a clear airway will improve  Outcome: Met This Shift

## 2021-08-22 NOTE — PROGRESS NOTES
ABDOMEN: Soft, nontender, nondistended, nontympanic, normal bowel sounds. Drinknt contrast for CT scan. No reported nausea or vomiting. MUSCULOSKELETAL:  Moves all extremities to command weakly. SKIN/EXTREMITIES: No rashes/ecchymosis, no edema/clubbing, warm/dry, good capillary refill. LINES:  Peripheral     Recent Labs     08/20/21 2059 08/21/21  0430 08/22/21  0520   WBC 13.7* 12.2* 11.5   HGB 14.2 14.3 14.0   HCT 45.4 45.4 44.3   MCV 85.2 84.4 84.2    255 247       Recent Labs     08/20/21 2059 08/20/21  2313 08/21/21  0430 08/22/21  0520   *  --  134 135   K 5.4* 5.2* 4.7 4.5   CO2 23  --  25 25   PHOS  --   --  3.3 3.2   BUN 11  --  9 16   CREATININE 0.6  --  0.5 0.6       Recent Labs     08/20/21 2059 08/22/21  0520 08/22/21  0645   PROT 8.4* 7.3  --    INR  --   --  1.1     ASSESSMENT/PLAN:     Principal Problem:    Brain mass  Active Problems:    Brain bleed (HCC)    Essential hypertension    Depression    Leukocytosis    AMS (altered mental status)    Morbid obesity with BMI of 50.0-59.9, adult (HCC)    Hyponatremia  Resolved Problems:    * No resolved hospital problems. *    Neuro: Left temporal parietal cystic mass with edema & midline shift. Hx of bipolar disorder & depression. Monitor neuro status. Neurosurgery following. Keppra for seizure prophylaxis. Lithium. Paxil. Decadron. HCT with contrast pending. CV:  No acute issues. Hx of HTN    Monitor hemodynamics. BP goal systolic less than 006 mm Hg. PRN hydralzine & labetalol. Norvasc. Pulm: No acute issues. Monitor RR & SpO2. O2 as needed. PRN  Albuterol. Encourage cough, SMI  & deep breathing. CT chest with contrast pending. GI: No acute issues. Morbid obesity. BMI 56. .    Monitor bowel function. NPO. Tigan. CT abdomen & pelvis with contrast pending. Renal:  No acute issues. Hx of stress incontinence. Monitor BUN & Cr, electrolytes & replace as needed.     Monitor I & O.    Arciniega. ID: Leukocytosis. Endocrine: Hyperglycemia. Hx of diabetes & hypothyroid nodules. Monitor BS.    ISS.       MSK:  Deconditioned.   ROM. Turn & reposition. PT & OT pending recommendations. Monitor for skin breakdown. Heme: No acute issues. Hx of anemia   Monitor CBC. Bowel regime: Bisacodyl   Pain control/Sedation:    DVT prophylaxis: SCD   GI prophylaxis: Protonix  Glucose protocol:  ISS  Arciniega: Keep in place for critical care monitoring of fluid balance. Ancillary consults:  Medicine. Neurosurgery. Palliative care. Critical care. Patient/Family update: Will update when available. Code status:   Full code. Disposition: Continue ICU. For surgery on Monday 08/23.       Electronically signed by Melecio Gonsalves RN MSN APRN-NP [facility] NP  CCNS CCRN 8/22/2021 9:59 AM

## 2021-08-23 ENCOUNTER — ANESTHESIA (OUTPATIENT)
Dept: OPERATING ROOM | Age: 56
DRG: 025 | End: 2021-08-23
Payer: MEDICARE

## 2021-08-23 VITALS
OXYGEN SATURATION: 100 % | DIASTOLIC BLOOD PRESSURE: 103 MMHG | SYSTOLIC BLOOD PRESSURE: 169 MMHG | RESPIRATION RATE: 18 BRPM | TEMPERATURE: 98.2 F

## 2021-08-23 LAB
ALBUMIN SERPL-MCNC: 3.9 G/DL (ref 3.5–5.2)
ALP BLD-CCNC: 29 U/L (ref 35–104)
ALT SERPL-CCNC: 18 U/L (ref 0–32)
ANION GAP SERPL CALCULATED.3IONS-SCNC: 7 MMOL/L (ref 7–16)
ANION GAP: 11 MMOL/L (ref 7–16)
AST SERPL-CCNC: 10 U/L (ref 0–31)
B.E.: -2.6 MMOL/L (ref -3–0)
BILIRUB SERPL-MCNC: 0.2 MG/DL (ref 0–1.2)
BUN BLDV-MCNC: 20 MG/DL (ref 6–20)
CALCIUM IONIZED: 1.35 MMOL/L (ref 1.15–1.33)
CALCIUM SERPL-MCNC: 9.5 MG/DL (ref 8.6–10.2)
CARDIOPULMONARY BYPASS: NO
CHLORIDE BLD-SCNC: 105 MMOL/L (ref 98–107)
CO2: 26 MMOL/L (ref 22–29)
CREAT SERPL-MCNC: 0.7 MG/DL (ref 0.5–1)
DEVICE: ABNORMAL
GFR AFRICAN AMERICAN: >60
GFR AFRICAN AMERICAN: >60
GFR NON-AFRICAN AMERICAN: >60 ML/MIN/1.73
GFR, ESTIMATED: >60 ML/MIN/1.73
GLUCOSE BLD-MCNC: 163 MG/DL (ref 74–99)
GLUCOSE BLD-MCNC: 254 MG/DL (ref 74–99)
HCO3 ARTERIAL: 20.7 MMOL/L (ref 22–26)
HCT (EST): 47 % (ref 34–48)
HCT VFR BLD CALC: 44.1 % (ref 34–48)
HEMOGLOBIN: 13.8 G/DL (ref 11.5–15.5)
HGB, (EST): 16 G/DL (ref 11.5–15.5)
MAGNESIUM: 2.4 MG/DL (ref 1.6–2.6)
MCH RBC QN AUTO: 26.8 PG (ref 26–35)
MCHC RBC AUTO-ENTMCNC: 31.3 % (ref 32–34.5)
MCV RBC AUTO: 85.6 FL (ref 80–99.9)
METER GLUCOSE: 141 MG/DL (ref 74–99)
METER GLUCOSE: 143 MG/DL (ref 74–99)
METER GLUCOSE: 162 MG/DL (ref 74–99)
METER GLUCOSE: 166 MG/DL (ref 74–99)
METER GLUCOSE: 185 MG/DL (ref 74–99)
METER GLUCOSE: 205 MG/DL (ref 74–99)
METER GLUCOSE: 213 MG/DL (ref 74–99)
MRSA CULTURE ONLY: NORMAL
O2 SATURATION: 98.9 % (ref 92–98.5)
OPERATOR ID: ABNORMAL
PCO2 ARTERIAL: 31.4 MMHG (ref 35–45)
PDW BLD-RTO: 14.8 FL (ref 11.5–15)
PH BLOOD GAS: 7.43 (ref 7.35–7.45)
PHOSPHORUS: 2.7 MG/DL (ref 2.5–4.5)
PLATELET # BLD: 274 E9/L (ref 130–450)
PMV BLD AUTO: 11.3 FL (ref 7–12)
PO2 ARTERIAL: 122.9 MMHG (ref 80–100)
POC BUN: 20 MG/DL (ref 8–23)
POC CHLORIDE: 105 MMOL/L (ref 100–108)
POC CO2: 21.3 MMOL/L (ref 22–29)
POC CREATININE: 0.7 MG/DL (ref 0.5–1)
POC IONIZED CALCIUM: 1.2
POC LACTIC ACID: 1.6
POC SODIUM: 137 MMOL/L (ref 132–146)
POTASSIUM SERPL-SCNC: 4.2 MMOL/L (ref 3.5–5.5)
POTASSIUM SERPL-SCNC: 4.6 MMOL/L (ref 3.5–5)
RBC # BLD: 5.15 E12/L (ref 3.5–5.5)
SODIUM BLD-SCNC: 138 MMOL/L (ref 132–146)
SOURCE, BLOOD GAS: ABNORMAL
TOTAL PROTEIN: 7 G/DL (ref 6.4–8.3)
URINE CULTURE, ROUTINE: NORMAL
WBC # BLD: 11.3 E9/L (ref 4.5–11.5)

## 2021-08-23 PROCEDURE — 3600000005 HC SURGERY LEVEL 5 BASE: Performed by: NEUROLOGICAL SURGERY

## 2021-08-23 PROCEDURE — 2500000003 HC RX 250 WO HCPCS: Performed by: NURSE PRACTITIONER

## 2021-08-23 PROCEDURE — 6360000002 HC RX W HCPCS: Performed by: NEUROLOGICAL SURGERY

## 2021-08-23 PROCEDURE — 2700000000 HC OXYGEN THERAPY PER DAY

## 2021-08-23 PROCEDURE — 6360000002 HC RX W HCPCS: Performed by: INTERNAL MEDICINE

## 2021-08-23 PROCEDURE — 7100000001 HC PACU RECOVERY - ADDTL 15 MIN

## 2021-08-23 PROCEDURE — 7100000000 HC PACU RECOVERY - FIRST 15 MIN

## 2021-08-23 PROCEDURE — 82962 GLUCOSE BLOOD TEST: CPT

## 2021-08-23 PROCEDURE — 37799 UNLISTED PX VASCULAR SURGERY: CPT

## 2021-08-23 PROCEDURE — 6370000000 HC RX 637 (ALT 250 FOR IP): Performed by: SURGERY

## 2021-08-23 PROCEDURE — 2580000003 HC RX 258: Performed by: INTERNAL MEDICINE

## 2021-08-23 PROCEDURE — 2580000003 HC RX 258: Performed by: NURSE PRACTITIONER

## 2021-08-23 PROCEDURE — 36415 COLL VENOUS BLD VENIPUNCTURE: CPT

## 2021-08-23 PROCEDURE — 2780000010 HC IMPLANT OTHER: Performed by: NEUROLOGICAL SURGERY

## 2021-08-23 PROCEDURE — 88307 TISSUE EXAM BY PATHOLOGIST: CPT

## 2021-08-23 PROCEDURE — 2720000010 HC SURG SUPPLY STERILE: Performed by: NEUROLOGICAL SURGERY

## 2021-08-23 PROCEDURE — 2500000003 HC RX 250 WO HCPCS

## 2021-08-23 PROCEDURE — 88331 PATH CONSLTJ SURG 1 BLK 1SPC: CPT

## 2021-08-23 PROCEDURE — 6360000002 HC RX W HCPCS

## 2021-08-23 PROCEDURE — 6360000002 HC RX W HCPCS: Performed by: NURSE ANESTHETIST, CERTIFIED REGISTERED

## 2021-08-23 PROCEDURE — C1713 ANCHOR/SCREW BN/BN,TIS/BN: HCPCS | Performed by: NEUROLOGICAL SURGERY

## 2021-08-23 PROCEDURE — 6370000000 HC RX 637 (ALT 250 FOR IP)

## 2021-08-23 PROCEDURE — 2500000003 HC RX 250 WO HCPCS: Performed by: NEUROLOGICAL SURGERY

## 2021-08-23 PROCEDURE — 3600000015 HC SURGERY LEVEL 5 ADDTL 15MIN: Performed by: NEUROLOGICAL SURGERY

## 2021-08-23 PROCEDURE — 6360000002 HC RX W HCPCS: Performed by: NURSE PRACTITIONER

## 2021-08-23 PROCEDURE — 2000000000 HC ICU R&B

## 2021-08-23 PROCEDURE — 85027 COMPLETE CBC AUTOMATED: CPT

## 2021-08-23 PROCEDURE — 80053 COMPREHEN METABOLIC PANEL: CPT

## 2021-08-23 PROCEDURE — 88342 IMHCHEM/IMCYTCHM 1ST ANTB: CPT

## 2021-08-23 PROCEDURE — 82330 ASSAY OF CALCIUM: CPT

## 2021-08-23 PROCEDURE — 3700000000 HC ANESTHESIA ATTENDED CARE: Performed by: NEUROLOGICAL SURGERY

## 2021-08-23 PROCEDURE — 82803 BLOOD GASES ANY COMBINATION: CPT

## 2021-08-23 PROCEDURE — 6370000000 HC RX 637 (ALT 250 FOR IP): Performed by: NEUROLOGICAL SURGERY

## 2021-08-23 PROCEDURE — 00B00ZX EXCISION OF BRAIN, OPEN APPROACH, DIAGNOSTIC: ICD-10-PCS | Performed by: NEUROLOGICAL SURGERY

## 2021-08-23 PROCEDURE — 3700000001 HC ADD 15 MINUTES (ANESTHESIA): Performed by: NEUROLOGICAL SURGERY

## 2021-08-23 PROCEDURE — 84100 ASSAY OF PHOSPHORUS: CPT

## 2021-08-23 PROCEDURE — 83735 ASSAY OF MAGNESIUM: CPT

## 2021-08-23 PROCEDURE — 2709999900 HC NON-CHARGEABLE SUPPLY: Performed by: NEUROLOGICAL SURGERY

## 2021-08-23 PROCEDURE — 2580000003 HC RX 258: Performed by: NURSE ANESTHETIST, CERTIFIED REGISTERED

## 2021-08-23 PROCEDURE — 36620 INSERTION CATHETER ARTERY: CPT

## 2021-08-23 DEVICE — LOW PROFILE BURR HOLE COVER, W/TAB, 10MM
Type: IMPLANTABLE DEVICE | Site: CRANIAL | Status: FUNCTIONAL
Brand: UNIVERSAL NEURO 2

## 2021-08-23 DEVICE — COLLAGEN DURAL REGENERATION MEMBRANE 3IN X 3IN (7.5CM X 7.5CM)
Type: IMPLANTABLE DEVICE | Site: BRAIN | Status: FUNCTIONAL
Brand: DURAMATRIX-ONLAY PLUS

## 2021-08-23 DEVICE — SCREW, AXS, SELF-TAPPING
Type: IMPLANTABLE DEVICE | Site: CRANIAL | Status: FUNCTIONAL
Brand: UNIVERSAL NEURO 3

## 2021-08-23 RX ORDER — LABETALOL HYDROCHLORIDE 5 MG/ML
10 INJECTION, SOLUTION INTRAVENOUS EVERY 10 MIN PRN
Status: DISCONTINUED | OUTPATIENT
Start: 2021-08-23 | End: 2021-08-23

## 2021-08-23 RX ORDER — ONDANSETRON 2 MG/ML
INJECTION INTRAMUSCULAR; INTRAVENOUS PRN
Status: DISCONTINUED | OUTPATIENT
Start: 2021-08-23 | End: 2021-08-23 | Stop reason: SDUPTHER

## 2021-08-23 RX ORDER — LIDOCAINE HYDROCHLORIDE 20 MG/ML
INJECTION, SOLUTION INTRAVENOUS PRN
Status: DISCONTINUED | OUTPATIENT
Start: 2021-08-23 | End: 2021-08-23 | Stop reason: SDUPTHER

## 2021-08-23 RX ORDER — DIAPER,BRIEF,INFANT-TODD,DISP
EACH MISCELLANEOUS PRN
Status: DISCONTINUED | OUTPATIENT
Start: 2021-08-23 | End: 2021-08-23 | Stop reason: ALTCHOICE

## 2021-08-23 RX ORDER — MORPHINE SULFATE 2 MG/ML
2 INJECTION, SOLUTION INTRAMUSCULAR; INTRAVENOUS
Status: DISCONTINUED | OUTPATIENT
Start: 2021-08-23 | End: 2021-08-24

## 2021-08-23 RX ORDER — LIDOCAINE HYDROCHLORIDE AND EPINEPHRINE 10; 10 MG/ML; UG/ML
INJECTION, SOLUTION INFILTRATION; PERINEURAL PRN
Status: DISCONTINUED | OUTPATIENT
Start: 2021-08-23 | End: 2021-08-23 | Stop reason: ALTCHOICE

## 2021-08-23 RX ORDER — VECURONIUM BROMIDE 1 MG/ML
INJECTION, POWDER, LYOPHILIZED, FOR SOLUTION INTRAVENOUS PRN
Status: DISCONTINUED | OUTPATIENT
Start: 2021-08-23 | End: 2021-08-23 | Stop reason: SDUPTHER

## 2021-08-23 RX ORDER — PROPOFOL 10 MG/ML
INJECTION, EMULSION INTRAVENOUS PRN
Status: DISCONTINUED | OUTPATIENT
Start: 2021-08-23 | End: 2021-08-23 | Stop reason: SDUPTHER

## 2021-08-23 RX ORDER — FENTANYL CITRATE 50 UG/ML
INJECTION, SOLUTION INTRAMUSCULAR; INTRAVENOUS PRN
Status: DISCONTINUED | OUTPATIENT
Start: 2021-08-23 | End: 2021-08-23 | Stop reason: SDUPTHER

## 2021-08-23 RX ORDER — LABETALOL HYDROCHLORIDE 5 MG/ML
10 INJECTION, SOLUTION INTRAVENOUS EVERY 10 MIN PRN
Status: DISCONTINUED | OUTPATIENT
Start: 2021-08-23 | End: 2021-08-26

## 2021-08-23 RX ORDER — HYDRALAZINE HYDROCHLORIDE 20 MG/ML
10 INJECTION INTRAMUSCULAR; INTRAVENOUS
Status: DISCONTINUED | OUTPATIENT
Start: 2021-08-23 | End: 2021-08-23

## 2021-08-23 RX ORDER — LABETALOL HYDROCHLORIDE 5 MG/ML
INJECTION, SOLUTION INTRAVENOUS PRN
Status: DISCONTINUED | OUTPATIENT
Start: 2021-08-23 | End: 2021-08-23 | Stop reason: SDUPTHER

## 2021-08-23 RX ORDER — DEXAMETHASONE SODIUM PHOSPHATE 10 MG/ML
INJECTION INTRAMUSCULAR; INTRAVENOUS PRN
Status: DISCONTINUED | OUTPATIENT
Start: 2021-08-23 | End: 2021-08-23 | Stop reason: SDUPTHER

## 2021-08-23 RX ORDER — MIDAZOLAM HYDROCHLORIDE 1 MG/ML
INJECTION INTRAMUSCULAR; INTRAVENOUS PRN
Status: DISCONTINUED | OUTPATIENT
Start: 2021-08-23 | End: 2021-08-23 | Stop reason: SDUPTHER

## 2021-08-23 RX ORDER — HYDRALAZINE HYDROCHLORIDE 20 MG/ML
INJECTION INTRAMUSCULAR; INTRAVENOUS PRN
Status: DISCONTINUED | OUTPATIENT
Start: 2021-08-23 | End: 2021-08-23 | Stop reason: SDUPTHER

## 2021-08-23 RX ORDER — HYDRALAZINE HYDROCHLORIDE 20 MG/ML
10 INJECTION INTRAMUSCULAR; INTRAVENOUS EVERY 10 MIN PRN
Status: DISCONTINUED | OUTPATIENT
Start: 2021-08-23 | End: 2021-08-26

## 2021-08-23 RX ORDER — VANCOMYCIN HYDROCHLORIDE 500 MG/10ML
INJECTION, POWDER, LYOPHILIZED, FOR SOLUTION INTRAVENOUS PRN
Status: DISCONTINUED | OUTPATIENT
Start: 2021-08-23 | End: 2021-08-23 | Stop reason: ALTCHOICE

## 2021-08-23 RX ORDER — ROCURONIUM BROMIDE 10 MG/ML
INJECTION, SOLUTION INTRAVENOUS PRN
Status: DISCONTINUED | OUTPATIENT
Start: 2021-08-23 | End: 2021-08-23 | Stop reason: SDUPTHER

## 2021-08-23 RX ADMIN — MIDAZOLAM 2 MG: 1 INJECTION INTRAMUSCULAR; INTRAVENOUS at 10:18

## 2021-08-23 RX ADMIN — DEXAMETHASONE SODIUM PHOSPHATE 4 MG: 4 INJECTION, SOLUTION INTRAMUSCULAR; INTRAVENOUS at 09:21

## 2021-08-23 RX ADMIN — MORPHINE SULFATE 2 MG: 2 INJECTION, SOLUTION INTRAMUSCULAR; INTRAVENOUS at 17:36

## 2021-08-23 RX ADMIN — LABETALOL HYDROCHLORIDE 10 MG: 5 INJECTION INTRAVENOUS at 17:06

## 2021-08-23 RX ADMIN — LABETALOL HYDROCHLORIDE 10 MG: 5 INJECTION INTRAVENOUS at 22:55

## 2021-08-23 RX ADMIN — MANNITOL 200 ML: 15 INJECTION, SOLUTION INTRAVENOUS at 11:10

## 2021-08-23 RX ADMIN — LIDOCAINE HYDROCHLORIDE 100 MG: 20 INJECTION, SOLUTION INTRAVENOUS at 10:22

## 2021-08-23 RX ADMIN — PROPOFOL 170 MG: 10 INJECTION, EMULSION INTRAVENOUS at 10:22

## 2021-08-23 RX ADMIN — INSULIN LISPRO 3 UNITS: 100 INJECTION, SOLUTION INTRAVENOUS; SUBCUTANEOUS at 18:02

## 2021-08-23 RX ADMIN — SUGAMMADEX 500 MG: 100 INJECTION, SOLUTION INTRAVENOUS at 14:08

## 2021-08-23 RX ADMIN — HYDRALAZINE HYDROCHLORIDE 10 MG: 20 INJECTION INTRAMUSCULAR; INTRAVENOUS at 11:48

## 2021-08-23 RX ADMIN — ONDANSETRON HYDROCHLORIDE 4 MG: 2 INJECTION, SOLUTION INTRAMUSCULAR; INTRAVENOUS at 13:58

## 2021-08-23 RX ADMIN — HYDRALAZINE HYDROCHLORIDE 10 MG: 20 INJECTION INTRAMUSCULAR; INTRAVENOUS at 19:00

## 2021-08-23 RX ADMIN — DEXAMETHASONE SODIUM PHOSPHATE 4 MG: 4 INJECTION, SOLUTION INTRAMUSCULAR; INTRAVENOUS at 02:11

## 2021-08-23 RX ADMIN — FENTANYL CITRATE 50 MCG: 50 INJECTION, SOLUTION INTRAMUSCULAR; INTRAVENOUS at 11:00

## 2021-08-23 RX ADMIN — VECURONIUM BROMIDE 3 MG: 10 INJECTION, POWDER, LYOPHILIZED, FOR SOLUTION INTRAVENOUS at 11:20

## 2021-08-23 RX ADMIN — LITHIUM CARBONATE 300 MG: 300 CAPSULE ORAL at 09:21

## 2021-08-23 RX ADMIN — INSULIN HUMAN 6 UNITS: 100 INJECTION, SOLUTION PARENTERAL at 12:55

## 2021-08-23 RX ADMIN — INSULIN HUMAN 4 UNITS: 100 INJECTION, SOLUTION PARENTERAL at 12:54

## 2021-08-23 RX ADMIN — HYDRALAZINE HYDROCHLORIDE 10 MG: 20 INJECTION INTRAMUSCULAR; INTRAVENOUS at 17:53

## 2021-08-23 RX ADMIN — HYDRALAZINE HYDROCHLORIDE 10 MG: 20 INJECTION INTRAMUSCULAR; INTRAVENOUS at 12:04

## 2021-08-23 RX ADMIN — LABETALOL HYDROCHLORIDE 10 MG: 5 INJECTION INTRAVENOUS at 20:02

## 2021-08-23 RX ADMIN — DEXAMETHASONE SODIUM PHOSPHATE 4 MG: 4 INJECTION, SOLUTION INTRAMUSCULAR; INTRAVENOUS at 15:41

## 2021-08-23 RX ADMIN — INSULIN HUMAN 6 UNITS: 100 INJECTION, SOLUTION PARENTERAL at 12:06

## 2021-08-23 RX ADMIN — ROCURONIUM BROMIDE 50 MG: 10 INJECTION, SOLUTION INTRAVENOUS at 10:22

## 2021-08-23 RX ADMIN — Medication 10 ML: at 09:21

## 2021-08-23 RX ADMIN — LABETALOL HYDROCHLORIDE 10 MG: 5 INJECTION INTRAVENOUS at 22:06

## 2021-08-23 RX ADMIN — FENTANYL CITRATE 50 MCG: 50 INJECTION, SOLUTION INTRAMUSCULAR; INTRAVENOUS at 14:33

## 2021-08-23 RX ADMIN — Medication 10 ML: at 20:02

## 2021-08-23 RX ADMIN — VECURONIUM BROMIDE 1 MG: 10 INJECTION, POWDER, LYOPHILIZED, FOR SOLUTION INTRAVENOUS at 12:44

## 2021-08-23 RX ADMIN — AMLODIPINE BESYLATE 5 MG: 5 TABLET ORAL at 09:26

## 2021-08-23 RX ADMIN — LABETALOL HYDROCHLORIDE 10 MG: 5 INJECTION INTRAVENOUS at 21:07

## 2021-08-23 RX ADMIN — DEXAMETHASONE SODIUM PHOSPHATE 10 MG: 10 INJECTION INTRAMUSCULAR; INTRAVENOUS at 10:22

## 2021-08-23 RX ADMIN — LEVETIRACETAM 750 MG: 1500 INJECTION, SOLUTION INTRAVENOUS at 16:20

## 2021-08-23 RX ADMIN — FENTANYL CITRATE 100 MCG: 50 INJECTION, SOLUTION INTRAMUSCULAR; INTRAVENOUS at 11:23

## 2021-08-23 RX ADMIN — PROPOFOL 30 MG: 10 INJECTION, EMULSION INTRAVENOUS at 11:00

## 2021-08-23 RX ADMIN — FENTANYL CITRATE 50 MCG: 50 INJECTION, SOLUTION INTRAMUSCULAR; INTRAVENOUS at 13:25

## 2021-08-23 RX ADMIN — LABETALOL HYDROCHLORIDE 10 MG: 5 INJECTION INTRAVENOUS at 23:19

## 2021-08-23 RX ADMIN — VANCOMYCIN HYDROCHLORIDE 1750 MG: 10 INJECTION, POWDER, LYOPHILIZED, FOR SOLUTION INTRAVENOUS at 23:13

## 2021-08-23 RX ADMIN — FENTANYL CITRATE 100 MCG: 50 INJECTION, SOLUTION INTRAMUSCULAR; INTRAVENOUS at 10:22

## 2021-08-23 RX ADMIN — VANCOMYCIN HYDROCHLORIDE 2000 MG: 1 INJECTION, POWDER, LYOPHILIZED, FOR SOLUTION INTRAVENOUS at 10:20

## 2021-08-23 RX ADMIN — DEXAMETHASONE SODIUM PHOSPHATE 4 MG: 4 INJECTION, SOLUTION INTRAMUSCULAR; INTRAVENOUS at 20:02

## 2021-08-23 RX ADMIN — LABETALOL HYDROCHLORIDE 5 MG: 5 INJECTION INTRAVENOUS at 11:39

## 2021-08-23 RX ADMIN — LABETALOL HYDROCHLORIDE 10 MG: 5 INJECTION INTRAVENOUS at 16:27

## 2021-08-23 RX ADMIN — LABETALOL HYDROCHLORIDE 10 MG: 5 INJECTION INTRAVENOUS at 18:32

## 2021-08-23 RX ADMIN — VECURONIUM BROMIDE 1 MG: 10 INJECTION, POWDER, LYOPHILIZED, FOR SOLUTION INTRAVENOUS at 12:09

## 2021-08-23 RX ADMIN — VECURONIUM BROMIDE 1 MG: 10 INJECTION, POWDER, LYOPHILIZED, FOR SOLUTION INTRAVENOUS at 13:37

## 2021-08-23 RX ADMIN — INSULIN LISPRO 3 UNITS: 100 INJECTION, SOLUTION INTRAVENOUS; SUBCUTANEOUS at 20:02

## 2021-08-23 RX ADMIN — LABETALOL HYDROCHLORIDE 5 MG: 5 INJECTION INTRAVENOUS at 11:31

## 2021-08-23 RX ADMIN — LABETALOL HYDROCHLORIDE 10 MG: 5 INJECTION INTRAVENOUS at 15:19

## 2021-08-23 ASSESSMENT — PULMONARY FUNCTION TESTS
PIF_VALUE: 37
PIF_VALUE: 0
PIF_VALUE: 35
PIF_VALUE: 4
PIF_VALUE: 39
PIF_VALUE: 37
PIF_VALUE: 17
PIF_VALUE: 38
PIF_VALUE: 28
PIF_VALUE: 37
PIF_VALUE: 30
PIF_VALUE: 38
PIF_VALUE: 38
PIF_VALUE: 39
PIF_VALUE: 37
PIF_VALUE: 0
PIF_VALUE: 37
PIF_VALUE: 32
PIF_VALUE: 0
PIF_VALUE: 38
PIF_VALUE: 0
PIF_VALUE: 41
PIF_VALUE: 17
PIF_VALUE: 30
PIF_VALUE: 38
PIF_VALUE: 20
PIF_VALUE: 37
PIF_VALUE: 30
PIF_VALUE: 26
PIF_VALUE: 38
PIF_VALUE: 36
PIF_VALUE: 20
PIF_VALUE: 38
PIF_VALUE: 32
PIF_VALUE: 0
PIF_VALUE: 28
PIF_VALUE: 32
PIF_VALUE: 38
PIF_VALUE: 37
PIF_VALUE: 23
PIF_VALUE: 37
PIF_VALUE: 37
PIF_VALUE: 32
PIF_VALUE: 36
PIF_VALUE: 20
PIF_VALUE: 37
PIF_VALUE: 20
PIF_VALUE: 20
PIF_VALUE: 38
PIF_VALUE: 37
PIF_VALUE: 26
PIF_VALUE: 21
PIF_VALUE: 26
PIF_VALUE: 0
PIF_VALUE: 24
PIF_VALUE: 38
PIF_VALUE: 37
PIF_VALUE: 20
PIF_VALUE: 26
PIF_VALUE: 38
PIF_VALUE: 38
PIF_VALUE: 31
PIF_VALUE: 36
PIF_VALUE: 37
PIF_VALUE: 1
PIF_VALUE: 37
PIF_VALUE: 32
PIF_VALUE: 40
PIF_VALUE: 21
PIF_VALUE: 38
PIF_VALUE: 37
PIF_VALUE: 38
PIF_VALUE: 17
PIF_VALUE: 24
PIF_VALUE: 38
PIF_VALUE: 24
PIF_VALUE: 24
PIF_VALUE: 1
PIF_VALUE: 26
PIF_VALUE: 38
PIF_VALUE: 26
PIF_VALUE: 1
PIF_VALUE: 28
PIF_VALUE: 38
PIF_VALUE: 37
PIF_VALUE: 37
PIF_VALUE: 36
PIF_VALUE: 26
PIF_VALUE: 36
PIF_VALUE: 24
PIF_VALUE: 37
PIF_VALUE: 30
PIF_VALUE: 37
PIF_VALUE: 34
PIF_VALUE: 24
PIF_VALUE: 1
PIF_VALUE: 17
PIF_VALUE: 26
PIF_VALUE: 37
PIF_VALUE: 6
PIF_VALUE: 24
PIF_VALUE: 38
PIF_VALUE: 37
PIF_VALUE: 38
PIF_VALUE: 24
PIF_VALUE: 37
PIF_VALUE: 26
PIF_VALUE: 24
PIF_VALUE: 40
PIF_VALUE: 26
PIF_VALUE: 33
PIF_VALUE: 24
PIF_VALUE: 36
PIF_VALUE: 37
PIF_VALUE: 26
PIF_VALUE: 39
PIF_VALUE: 39
PIF_VALUE: 38
PIF_VALUE: 37
PIF_VALUE: 38
PIF_VALUE: 3
PIF_VALUE: 37
PIF_VALUE: 28
PIF_VALUE: 37
PIF_VALUE: 37
PIF_VALUE: 36
PIF_VALUE: 37
PIF_VALUE: 38
PIF_VALUE: 2
PIF_VALUE: 37
PIF_VALUE: 30
PIF_VALUE: 37
PIF_VALUE: 24
PIF_VALUE: 32
PIF_VALUE: 30
PIF_VALUE: 37
PIF_VALUE: 32
PIF_VALUE: 37
PIF_VALUE: 26
PIF_VALUE: 38
PIF_VALUE: 21
PIF_VALUE: 17
PIF_VALUE: 26
PIF_VALUE: 26
PIF_VALUE: 32
PIF_VALUE: 38
PIF_VALUE: 38
PIF_VALUE: 37
PIF_VALUE: 38
PIF_VALUE: 32
PIF_VALUE: 2
PIF_VALUE: 37
PIF_VALUE: 38
PIF_VALUE: 26
PIF_VALUE: 32
PIF_VALUE: 26
PIF_VALUE: 37
PIF_VALUE: 31
PIF_VALUE: 37
PIF_VALUE: 38
PIF_VALUE: 31
PIF_VALUE: 32
PIF_VALUE: 17
PIF_VALUE: 38
PIF_VALUE: 24
PIF_VALUE: 38
PIF_VALUE: 41
PIF_VALUE: 32
PIF_VALUE: 1
PIF_VALUE: 34
PIF_VALUE: 38
PIF_VALUE: 24
PIF_VALUE: 37
PIF_VALUE: 38
PIF_VALUE: 35
PIF_VALUE: 37
PIF_VALUE: 28
PIF_VALUE: 39
PIF_VALUE: 26
PIF_VALUE: 40
PIF_VALUE: 24
PIF_VALUE: 28
PIF_VALUE: 38
PIF_VALUE: 38
PIF_VALUE: 37
PIF_VALUE: 37
PIF_VALUE: 38
PIF_VALUE: 37
PIF_VALUE: 32
PIF_VALUE: 38
PIF_VALUE: 21
PIF_VALUE: 24
PIF_VALUE: 38
PIF_VALUE: 24
PIF_VALUE: 33
PIF_VALUE: 26
PIF_VALUE: 0
PIF_VALUE: 40
PIF_VALUE: 32
PIF_VALUE: 37
PIF_VALUE: 37
PIF_VALUE: 32
PIF_VALUE: 38
PIF_VALUE: 30
PIF_VALUE: 37
PIF_VALUE: 37
PIF_VALUE: 30
PIF_VALUE: 36
PIF_VALUE: 37
PIF_VALUE: 37
PIF_VALUE: 3
PIF_VALUE: 26
PIF_VALUE: 19
PIF_VALUE: 38
PIF_VALUE: 36
PIF_VALUE: 35
PIF_VALUE: 23
PIF_VALUE: 1
PIF_VALUE: 38
PIF_VALUE: 38
PIF_VALUE: 36
PIF_VALUE: 18
PIF_VALUE: 32
PIF_VALUE: 6
PIF_VALUE: 22
PIF_VALUE: 32
PIF_VALUE: 37
PIF_VALUE: 17
PIF_VALUE: 37
PIF_VALUE: 38
PIF_VALUE: 26
PIF_VALUE: 32
PIF_VALUE: 37

## 2021-08-23 ASSESSMENT — PAIN SCALES - GENERAL
PAINLEVEL_OUTOF10: 7
PAINLEVEL_OUTOF10: 0
PAINLEVEL_OUTOF10: 0

## 2021-08-23 ASSESSMENT — LIFESTYLE VARIABLES: SMOKING_STATUS: 0

## 2021-08-23 NOTE — CARE COORDINATION
SOCIAL WORK/CASEMANAGEMENT TRANSITION OF CARE [address] [place], [address], [facility], [phone]): pt out of surgery for brain mass. Called spouse and he is not able to talk but will be in tomorrow to meet with huam.  Dina Lowery, MARYCRUZ  8/23/2021

## 2021-08-23 NOTE — PROGRESS NOTES
INTRAOPERATIVE CONSULTATION (with FROZEN SECTION)    A. 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 Brain tumor, biopsy #2: Suspicious for astrocytoma, negative got GBM.

## 2021-08-23 NOTE — PROGRESS NOTES
Pharmacy Consultation Note  (Antibiotic Dosing and Monitoring)    Initial consult date: 8/23/21  Consulting physician/provider: Jesus MACKEY  Drug: Vancomycin  Indication: Prophylaxis     Age/  Gender Height Weight IBW  Allergy Information   56 y.o./female  162.6 cm    150.6 kg   Patient weight not recorded   Clindamycin/lincomycin, Iodine, Penicillins, and Sulfa antibiotics      Renal Function:  Recent Labs     08/21/21  0430 08/21/21  0430 08/22/21  0520 08/23/21  0425 08/23/21  1156   BUN 9  --  16 20  --    CREATININE 0.5   < > 0.6 0.7 0.7    < > = values in this interval not displayed. Intake/Output Summary (Last 24 hours) at 8/23/2021 1544  Last data filed at 8/23/2021 1428  Gross per 24 hour   Intake 2150 ml   Output 1585 ml   Net 565 ml       Vancomycin Monitoring:  Trough:  No results for input(s): VANCOTROUGH in the last 72 hours. Random:  No results for input(s): VANCORANDOM in the last 72 hours. Vancomycin Administration Times:  Recent vancomycin administrations                   vancomycin (VANCOCIN) injection (mg) 500 mg Given 08/23/21 1154    vancomycin (VANCOCIN) 2,000 mg in dextrose 5 % 500 mL IVPB (mg) 2,000 mg New Bag 08/23/21 1020                Assessment:  · Patient is a 60 y.o. female who has been initiated on vancomycin  · CrCl cannot be calculated (Unknown ideal weight. ). · Goal trough = 15 - 20 mcg/ml;  Goal AUC/EZRA 400-600  · Received vancomycin 2000mg x 1     Plan:  · Vancomycin 1750mg IV every 12 hours  · Will check vancomycin levels when appropriate  · Will continue to monitor renal function   · Clinical pharmacy to follow      Marlene Baltazar [facility] 8/23/2021 3:44 PM

## 2021-08-23 NOTE — PLAN OF CARE
Problem: Discharge Planning:  Goal: Discharged to appropriate level of care  Description: Discharged to appropriate level of care  Outcome: Met This Shift     Problem: Verbal Communication - Impaired:  Goal: Functional communication will improve  Description: Functional communication will improve  Outcome: Met This Shift     Problem: Pain:  Goal: Recognizes and communicates pain  Description: Recognizes and communicates pain  Outcome: Met This Shift     Problem: Mobility - Impaired:  Goal: Able to verbalize acceptance of life and situations over which he or she has no control  Description: Absence of contracture deformity  Outcome: Met This Shift     Problem: Autonomic Dysreflexia - Risk of:  Goal: Absence of autonomic dysreflexia signs and symptoms  Description: Absence of autonomic dysreflexia signs and symptoms  Outcome: Met This Shift     Problem: Injury - Risk of, Healthcare-Acquired Condition:  Goal: Will remain free from falls  Description: Will remain free from falls  Outcome: Met This Shift     Problem: Falls - Risk of:  Goal: Will remain free from falls  Description: Will remain free from falls  Outcome: Met This Shift

## 2021-08-23 NOTE — PLAN OF CARE
Problem: Discharge Planning:  Goal: Discharged to appropriate level of care  Description: Discharged to appropriate level of care  8/23/2021 1000 by Donte Gould RN  Outcome: Ongoing  8/23/2021 0022 by Kiet Gastelum RN  Outcome: Met This Shift     Problem: Verbal Communication - Impaired:  Goal: Functional communication will improve  Description: Functional communication will improve  8/23/2021 1000 by Donte Gould RN  Outcome: Ongoing  8/23/2021 0022 by Kiet Gastelum RN  Outcome: Met This Shift  Goal: Ability to interact with others will improve  Description: Ability to interact with others will improve  Outcome: Met This Shift  Goal: Ability to establish a method of communication will improve  Description: Ability to establish a method of communication will improve  Outcome: Met This Shift     Problem: Pain:  Goal: Pain level will decrease  Description: Pain level will decrease  Outcome: Met This Shift  Goal: Recognizes and communicates pain  Description: Recognizes and communicates pain  8/23/2021 1000 by Donte Gould RN  Outcome: Met This Shift  8/23/2021 0022 by Kiet Gastelum RN  Outcome: Met This Shift     Problem: Mobility - Impaired:  Goal: Able to ambulate independently  Description: Able to ambulate independently  Outcome: Ongoing  Goal: Able to ambulate with minimal assistance  Description: Able to ambulate with minimal assistance  Outcome: Ongoing  Goal: Ability to appropriately use an adaptive device for ambulation will improve  Description: Ability to appropriately use an adaptive device for ambulation will improve  Outcome: Ongoing  Goal: Able to verbalize acceptance of life and situations over which he or she has no control  Description: Absence of contracture deformity  8/23/2021 1000 by Donte Gould RN  Outcome: Ongoing  8/23/2021 0022 by Kiet Gastelum RN  Outcome: Met This Shift     Problem: Autonomic Dysreflexia - Risk of:  Goal: Absence of autonomic dysreflexia signs and symptoms  Description: Absence of autonomic dysreflexia signs and symptoms  8/23/2021 1000 by Bernice Stevenson RN  Outcome: Ongoing  8/23/2021 0022 by Rico Rios RN  Outcome: Met This Shift     Problem: Injury - Risk of, Healthcare-Acquired Condition:  Goal: Absence of healthcare acquired conditions  Description: Absence of healthcare acquired conditions  Outcome: Met This Shift  Goal: Will remain free from falls  Description: Will remain free from falls  8/23/2021 1000 by Bernice Stevenson RN  Outcome: Met This Shift  8/23/2021 0022 by Rico Rios RN  Outcome: Met This Shift  Goal: Absence of pressure ulcer  Description: Absence of pressure ulcer  Outcome: Met This Shift  Goal: Demonstration of wound healing without infection will improve  Description: Demonstration of wound healing without infection will improve  Outcome: Met This Shift  Goal: Absence of urinary tract infection signs and symptoms  Description: Absence of urinary tract infection signs and symptoms  Outcome: Met This Shift     Problem: Nutrition Deficit - Risk of:  Goal: Ability to achieve adequate nutritional intake will improve  Description: Ability to achieve adequate nutritional intake will improve  Outcome: Ongoing  Goal: Maintenance of adequate weight for body size and type will improve to within specified parameters  Description: Maintenance of adequate weight for body size and type will improve to within specified parameters  Outcome: Met This Shift     Problem: Swallowing - Impaired:  Goal: Able to swallow without choking  Description: Able to swallow without choking  Outcome: Met This Shift  Goal: Absence of aspiration  Description: Absence of aspiration  Outcome: Met This Shift     Problem: Aspiration - Risk of:  Goal: Absence of aspiration  Description: Absence of aspiration  Outcome: Met This Shift     Problem: Respiratory Function - Compromised:  Goal: Absence of pulmonary infection  Description: Absence of pulmonary infection  Outcome: Met This Shift  Goal: Levels of oxygenation will improve  Description: Levels of oxygenation will improve  Outcome: Met This Shift  Goal: Increase in ventilator-free time  Description: Increase in ventilator-free time  Outcome: Met This Shift  Goal: Ability to maintain a clear airway will improve  Description: Ability to maintain a clear airway will improve  Outcome: Met This Shift     Problem:  Bowel Function - Altered:  Goal: Bowel elimination is within specified parameters  Description: Bowel elimination is within specified parameters  Outcome: Ongoing  Goal: Control of bowel function will improve  Description: Control of bowel function will improve  Outcome: Ongoing  Goal: Ability to maintain normal bowel function will improve  Description: Ability to maintain normal bowel function will improve  Outcome: Ongoing     Problem: Urinary Elimination - Impaired:  Goal: Urinary elimination within specified parameters  Description: Urinary elimination within specified parameters  Outcome: Not Met This Shift  Goal: Absence of postvoid residual urine  Description: Absence of postvoid residual urine  Outcome: Not Met This Shift  Goal: Absence of incontinence - Urinary  Description: Absence of incontinence - Urinary  Outcome: Not Met This Shift  Goal: Reports of episodes of incontinence will decrease - Urinary  Description: Reports of episodes of incontinence will decrease - Urinary  Outcome: Not Met This Shift     Problem: Self-Care Deficit:  Goal: Ability to perform activities of daily living will improve  Description: Ability to perform activities of daily living will improve  Outcome: Ongoing  Goal: Able to perform ADL with assistance  Description: Able to perform ADL with assistance  Outcome: Ongoing  Goal: Ability to communicate needs accurately will improve - ADL needs  Description: Ability to communicate needs accurately will improve - ADL needs  Outcome: Ongoing  Goal: Able

## 2021-08-23 NOTE — BRIEF OP NOTE
Brief Postoperative Note      Patient: Jak Reyes  YOB: 1965  MRN: [id number]    Date of Procedure: 8/23/2021    Pre-Op Diagnosis: Brain tumor  Post-Op Diagnosis: possible low grade glioma       Procedure; left temporoparietal frameless stereotactic guided craniotomy for tumor    Surgeon(s):  Phyllis Burris MD    Assistant:  * No surgical staff found *    Anesthesia: General    Estimated Blood Loss (mL): 218     Complications: None    Specimens:   ID Type Source Tests Collected by Time Destination   A : BRAIN MASS Tissue Tissue SURGICAL PATHOLOGY Phyllis Burris MD 8/23/2021 1229    B : BRAIN MASS Tissue Tissue SURGICAL PATHOLOGY Phyllis Burris MD 8/23/2021 1230    C : [address] [facility] MD Mata 8/23/2021 1243        Implants:  Implant Name Type Inv. Item Serial No.  Lot No. LRB No. Used Action   GRAFT DURA [id number] THK0.6MM CLLGN MEM DURAMATRIX-ONLAY +  GRAFT DURA [id number] THK0.6MM CLLGN MEM DURAMATRIX-ONLAY +  [employer]WD [id number] Left 1 Implanted   SCREW BONE L4MM DIA1. 5MM ST SELF CNTR AX STBL UNIII  SCREW BONE L4MM DIA1. 5MM ST SELF CNTR AX STBL UNIII  [employer] HAWA-WD  Left 31 Implanted   COVER BUR H [id number] CRANIOMAXILLOFACIAL PLT LO PROF W/ TAB  COVER BUR H [id number] CRANIOMAXILLOFACIAL PLT LO PROF W/ TAB  LLOYD CRANIOMAXILLOFACIAL-WD  Left 6 Implanted   COVER BUR H L10MM W/ SHUNT PASS  COVER BUR H L10MM W/ SHUNT PASS  [employer] HAWA-WD  Left 1 Implanted         Drains:   ICP monitor (Active)       Urethral Catheter (Active)   $ Urethral catheter insertion $ Not inserted for procedure 08/21/21 0800   Catheter Indications Need for fluid volume management of the critically ill patient in a critical care setting 08/23/21 1430   Site Assessment No urethral drainage 08/23/21 1430   Urine Color Yellow 08/23/21 1430   Urine Appearance Clear 08/23/21 1430   Output (mL) 60 mL 08/23/21 1430       Findings:     Electronically signed by Crow Cooley MD Mata on 8/23/2021 at 2:39 PM

## 2021-08-23 NOTE — ANESTHESIA PROCEDURE NOTES
Arterial Line:    An arterial line was placed using surface landmarks, in the OR for the following indication(s): continuous blood pressure monitoring and blood sampling needed. A 20 gauge (size), 1 and 3/4 inch (length), Arrow (type) catheter was placed, Seldinger technique not used, into the right radial artery, secured by tape and Tegaderm. Anesthesia type: Local  Local infiltration: Injection    Events:  patient tolerated procedure well with no complications and EBL < 5mL. 8/23/2021 10:10 AM8/23/2021 10:15 AM  Anesthesiologist: Houston Little MD  Resident/CRNA: Eliane Crespo, [address]  Other anesthesia staff: Sindi Glasgow RN  Performed:  Other anesthesia staff   Preanesthetic Checklist  Completed: patient identified, IV checked, site marked, risks and benefits discussed, surgical consent, monitors and equipment checked, pre-op evaluation, timeout performed, anesthesia consent given, oxygen available and patient being monitored

## 2021-08-23 NOTE — ANESTHESIA POSTPROCEDURE EVALUATION
Department of Anesthesiology  Postprocedure Note    Patient: Juan Dela Cruz  MRN: [id number]  [place]: 1965  Date of evaluation: 8/23/2021  Time:  4:10 PM     Procedure Summary     Date: 08/23/21 Room / Location: [address] [place] [address] 20 / [facility]    Anesthesia Start: 1005 Anesthesia Stop: 1430    Procedure: [address] and pathology (Left Head) Diagnosis: (POSSIBLE BRAIN TUMOR)    Surgeons: Gayla Lemus MD Responsible Provider: Dante Hernandez MD    Anesthesia Type: general ASA Status: 4          Anesthesia Type: general    Angélica Phase I: Angélica Score: 8    Angélica Phase II:      Last vitals: Reviewed and per EMR flowsheets. Anesthesia Post Evaluation    Patient location during evaluation: ICU  Patient participation: complete - patient participated  Level of consciousness: awake and alert  Airway patency: patent  Nausea & Vomiting: no nausea and no vomiting  Complications: no  Cardiovascular status: hemodynamically stable  Respiratory status: acceptable  Hydration status: euvolemic  Comments: Department of Anesthesiology  Post-Anesthesia Note    Name:  Juan Dela Cruz                                         Age:  60 y.o.   MRN:  [id number]     Last Vitals:  BP (!) 174/56   Pulse 83   Temp 98.2 °F (36.8 °C)   Resp 15   SpO2 98%   Patient Vitals in the past 4 hrs:  08/23/21 1530, Temp:98.2 °F (36.8 °C), Pulse:83, Resp:15, SpO2:98 %  08/23/21 1515, Temp:98.2 °F (36.8 °C), Pulse:93, Resp:16, SpO2:97 %  08/23/21 1500, Temp:98.2 °F (36.8 °C), Pulse:93, Resp:16, SpO2:97 %  08/23/21 1445, Pulse:93, Resp:15, SpO2:97 %  08/23/21 1430, BP:(!) 174/56, Temp:97.9 °F (36.6 °C), Temp src:Temporal, Pulse:96, Resp:14, SpO2:100 %    Level of Consciousness:  Awake    Respiratory:  Stable    Oxygen Saturation:  Stable    Cardiovascular:  Stable    Hydration:  Adequate    PONV:  Stable    Post-op Pain:  Adequate analgesia    Post-op Assessment:  No apparent anesthetic

## 2021-08-23 NOTE — ANESTHESIA PRE PROCEDURE
Department of Anesthesiology  Preprocedure Note       Name:  Jennifer Rojas   Age:  60 y.o.  :  1965                                          MRN:  [id number]         Date:  2021      Surgeon: Pam Rossi):  Rosaleen Mcburney, MD    Procedure: Procedure(s):  CRANIOTOMY STEREOTATIC BIOPSY WANTS [place] and pathology    Medications prior to admission:   Prior to Admission medications    Medication Sig Start Date End Date Taking?  Authorizing Provider   amLODIPine (NORVASC) 5 MG tablet Take by mouth 09   Historical Provider, MD   hydrOXYzine (ATARAX) 10 MG tablet  19   Historical Provider, MD   vitamin B-12 (CYANOCOBALAMIN) 100 MCG tablet Take 50 mcg by mouth daily    Historical Provider, MD   vitamin D (CHOLECALCIFEROL) 1000 UNIT TABS tablet Take 1,000 Units by mouth daily    Historical Provider, MD   melatonin 3 MG TABS tablet Take 3 mg by mouth daily    Historical Provider, MD   PARoxetine (PAXIL) 30 MG tablet Take 40 mg by mouth every morning    Historical Provider, MD   lithium 300 MG tablet Take 300 mg by mouth 4 times daily    Historical Provider, MD   albuterol (PROVENTIL) (2.5 MG/3ML) 0.083% nebulizer solution Take 2.5 mg by nebulization every 6 hours as needed for Wheezing    Historical Provider, MD       Current medications:    Current Facility-Administered Medications   Medication Dose Route Frequency Provider Last Rate Last Admin    scopolamine (TRANSDERM-SCOP) transdermal patch 1 patch  1 patch Transdermal Q72H Chu Grigsby, DO        diphenhydrAMINE (BENADRYL) injection 25 mg  25 mg Intravenous BID PRN Chu Grigsby, DO        sodium chloride flush 0.9 % injection 10 mL  10 mL Intravenous Once goAct, DO        sodium chloride flush 0.9 % injection 5-40 mL  5-40 mL Intravenous 2 times per day April TOSIN ZengN - CNP   10 mL at 21 0921    sodium chloride flush 0.9 % injection 5-40 mL  5-40 mL Intravenous PRN April SKY Zeng - CNP        0.9 % sodium chloride infusion  25 mL Intravenous PRN April Dade-Kanorado, APRN - CNP        bisacodyl (DULCOLAX) suppository 10 mg  10 mg Rectal Daily PRN April Lia-Kanorado, APRN - CNP        trimethobenzamide Adelene Ontiveros) injection 200 mg  200 mg Intramuscular Q6H PRN April Lia-Eulogio, APRN - CNP   200 mg at 08/22/21 4232    hydrALAZINE (APRESOLINE) injection 10 mg  10 mg Intravenous Q2H PRN Samara E Kaercher, DO   10 mg at 08/22/21 1109    albuterol (PROVENTIL) nebulizer solution 2.5 mg  2.5 mg Nebulization Q6H PRN Samara E Kaercher, DO        amLODIPine (NORVASC) tablet 5 mg  5 mg Oral Daily Samara E Kaercher, DO   5 mg at 08/23/21 1979    lithium capsule 300 mg  300 mg Oral 4x Daily Samara E Kaercher, DO   300 mg at 08/23/21 7533    PARoxetine (PAXIL) tablet 40 mg  40 mg Oral QAM Samara E Kaercher, DO   40 mg at 08/22/21 0747    insulin lispro (HUMALOG) injection vial 0-18 Units  0-18 Units Subcutaneous Q4H Samara E Kaercher, DO        0.9 % sodium chloride infusion   Intravenous Continuous SKY Mcgill - CNS 50 mL/hr at 08/21/21 0848 Rate Change at 08/21/21 0848    glucose (GLUTOSE) 40 % oral gel 15 g  15 g Oral PRN Samara E Kaercher, DO        dextrose 50 % IV solution  12.5 g Intravenous PRN Samara E Kaercher, DO        glucagon (rDNA) injection 1 mg  1 mg Intramuscular PRN Samara E Kaercher, DO        dextrose 5 % solution  100 mL/hr Intravenous PRN Samara E Kaercher, DO        dexamethasone (DECADRON) injection 4 mg  4 mg Intravenous Q6H Ainsley Lomax MD   4 mg at 08/23/21 0921    levETIRAcetam (KEPPRA) 750 mg/50 mL IVPB  750 mg Intravenous Q12H Ainsley Lomax MD   Stopped at 08/23/21 0024       Allergies:     Allergies   Allergen Reactions    Clindamycin/Lincomycin     Iodine     Penicillins     Sulfa Antibiotics        Problem List:    Patient Active Problem List   Diagnosis Code    Morbid obesity ([facility] E66.01    Brain mass G93.89    Brain bleed ([facility] I61.9    Essential hypertension I10    Depression F32.9    Leukocytosis D72.829    AMS (altered mental status) R41.82    Morbid obesity with BMI of 50.0-59.9, adult (HCC) E66.01, Z68.43    Hyponatremia E87.1       Past Medical History:        Diagnosis Date    Anemia     Asthma     Bipolar [facility] disorder [facility])     Depression     Diabetes mellitus ([facility]     Hypertension     PMB (postmenopausal bleeding)     Stress incontinence     Thyroid disease     nodules in thyroid- followed by Dr. Mary Ellen Trivedi. Past Surgical History:        Procedure Laterality Date     SECTION      DILATION AND CURETTAGE OF UTERUS  2017    HYSTERECTOMY      TONSILLECTOMY         Social History:    Social History     Tobacco Use    Smoking status: Never Smoker    Smokeless tobacco: Never Used   Substance Use Topics    Alcohol use:  No                                Counseling given: Not Answered      Vital Signs (Current):   Vitals:    21 0700 21 0800 21 0831 21 0900   BP: (!) 141/76 (!) 161/73 (!) 155/77 (!) 148/79   Pulse: 76 77 78 81   Resp: 15  18 16   Temp:  36.7 °C (98 °F)     TempSrc:  Oral     SpO2: 96% 96% 97% 94%                                              BP Readings from Last 3 Encounters:   21 (!) 148/79   21 (!) 142/78   19 (!) 142/76       NPO Status:                                                                                 BMI:   Wt Readings from Last 3 Encounters:   21 (!) 332 lb (150.6 kg)   19 (!) 332 lb (150.6 kg)   17 (!) 310 lb (140.6 kg)     There is no height or weight on file to calculate BMI.    CBC:   Lab Results   Component Value Date    WBC 11.3 2021    RBC 5.15 2021    RBC 4.68 2017    HGB 13.8 2021    HCT 44.1 2021    MCV 85.6 2021    RDW 14.8 2021     2021       CMP:   Lab Results   Component Value Date     2021    K 4.6 2021    K 4.7 2021    CL 105 08/23/2021    CO2 26 08/23/2021    BUN 20 08/23/2021    CREATININE 0.7 08/23/2021    GFRAA >60 08/23/2021    LABGLOM >60 08/23/2021    GLUCOSE 163 08/23/2021    PROT 7.0 08/23/2021    CALCIUM 9.5 08/23/2021    BILITOT 0.2 08/23/2021    ALKPHOS 29 08/23/2021    AST 10 08/23/2021    ALT 18 08/23/2021       POC Tests: No results for input(s): POCGLU, POCNA, POCK, POCCL, POCBUN, POCHEMO, POCHCT in the last 72 hours.     Coags:   Lab Results   Component Value Date    PROTIME 12.1 08/22/2021    INR 1.1 08/22/2021       HCG (If Applicable): No results found for: PREGTESTUR, PREGSERUM, HCG, HCGQUANT     ABGs: No results found for: PHART, PO2ART, OGI9XJG, ADV3AQF, BEART, M4JYTUNF     Type & Screen (If Applicable):  Lab Results   Component Value Date    LABABO O 05/04/2017       Drug/Infectious Status (If Applicable):  No results found for: HIV, HEPCAB    COVID-19 Screening (If Applicable): No results found for: COVID19    ORDERING SYSTEM PROVIDED HISTORY: Brain tumor   TECHNOLOGIST PROVIDED HISTORY:   Reason for exam:->Brain tumor   What reading provider will be dictating this exam?->CRC       FINDINGS:   CT cervical spine.       There is no acute fracture or dislocation in the cervical spine.  There is   diffuse advanced degenerative changes from C2-T1 with osteophytes and   multilevel disc bulges.  Large anterior osteophytes are also noted.  The   prevertebral soft tissues are normal.  A well corticated bony fragment is   identified at the tip of the dense probably a previous ununited ossifications   center versus old avulsion injury.  Note is made of a large cystic mass in   the left temporal fossa measuring 6 x 4 cm likely necrotic malignancy.  Brain   abscess can have a similar appearance.           Impression   No acute displaced fracture.       Advanced diffuse degenerative changes with multilevel disc bulges from C2-T1.       A large necrotic mass in the left temporal lobe.       CT chest.       There is cardiomegaly.  The great vessels are normal.  Trachea and major   bronchi are patent.  There is atelectasis in the lung bases.  There is mosaic   pattern of ground-glass opacities which may be due to air trapping due to   small airway disease.  Edema or pneumonia are less likely.  Degenerative   changes are identified in the thoracic spine with bridging osteophytes. 1   1.1 cm right thyroid nodule is present.  Consider ultrasonographic   surveillance.       Impression       Cardiomegaly with mild diffuse ground-glass opacities and atelectasis likely   due to air trapping with small airway disease.  Pneumonia or edema are less   likely.       CT abdomen pelvis.       There is hepatomegaly with liver measuring 18 cm with diffuse fatty   infiltration.  There is a 3.4 x 3.4 cm infiltrative hypodense lesion in the   medial left hepatic lobe which is indeterminate for malignancy.  Gallbladder   is distended.  Spleen appears normal.  1.1 cm low-attenuation lesion is   identified in the body of pancreas of unknown etiology.  Adrenals and the   kidneys are normal.  Degenerative changes are noted in the lumbar spine.  Fat   containing umbilical hernia is noted.       Pelvis.  Bladder is contracted with a Arciniega catheter.  Colon is collapsed. The appendix is normal.       Impression       Fatty liver with a 3.4 x 3.4 cm ill-defined indeterminate hypodense lesion in   the medial left hepatic lobe.  Developing malignancy is a consideration. Consider PET-CT scan.       1.1 cm indeterminate hypodense lesion in the body of pancreas.  Surveillance   is recommended.      EKG   Ventricular Rate BPM 96    Atrial Rate BPM 96    P-R Interval ms 178    QRS Duration ms 94    Q-T Interval ms 366    QTc Calculation (Bazett) ms 462    P Axis degrees 67    R Axis degrees -14    T Axis degrees 37    Resulting Agency  [facility] [place]      Narrative & Impression    Normal sinus rhythm  Moderate voltage criteria for LVH, may be normal variant  Borderline ECG         Anesthesia Evaluation  Patient summary reviewed and Nursing notes reviewed no history of anesthetic complications:   Airway: Mallampati: III  TM distance: <3 FB   Neck ROM: limited  Mouth opening: < 3 FB Dental:          Pulmonary:   (+) decreased breath sounds,  asthma:     (-) not a current smoker          Patient did not smoke on day of surgery. Cardiovascular:    (+) hypertension ( new per pt. off meds last 15 years ):,       ECG reviewed  Rhythm: regular  Rate: normal           Beta Blocker:  Not on Beta Blocker         Neuro/Psych:   (+) psychiatric history (depession ; bipolar type 1 on lithium ): stable with treatmentdepression/anxiety              ROS comment: Mass effect L ventricle with midline shift GI/Hepatic/Renal:   (+) liver disease ( fatty liver ):, morbid obesity          Endo/Other:    (+) Diabetes ( pre diabetic ), blood dyscrasia: anemia:., . Hypothyroidism:  thyroid nodules followig with dr Gary Garcia                Abdominal:             Vascular: Other Findings:           Anesthesia Plan      general     ASA 4       Induction: intravenous. arterial line  MIPS: Postoperative opioids intended and Prophylactic antiemetics administered. Anesthetic plan and risks discussed with patient, father and mother. Use of blood products discussed with patient whom consented to blood products. Plan discussed with CRNA and attending. Chris Worley RN   8/23/2021      DOS STAFF ADDENDUM:    Patient seen and examined, physical exam updated as needed, chart reviewed. NPO status confirmed. Anesthetic options and risks discussed with patient/legal guardian. Patient/legal guardian verbalized understanding and agrees to proceed.      Niru Candelaria MD  Staff Anesthesiologist  August 23, 2021  10:10 AM

## 2021-08-23 NOTE — PROGRESS NOTES
Patient is recovering from craniotomy. She has been stable, was extubated, and moving all extremities per nursing staff. No family at bedside or in the waiting room. Will continue to follow and follow up with family tomorrow.

## 2021-08-23 NOTE — PROGRESS NOTES
Intensive Care Unit  Critical Care Consult  Daily Progress Note 8/23/2021    Date of Admission: 8/21    EVENTS:   08/20  Presented to [facility] ED for AMS & vomiting. Expressive aphasia. Has had progressive neurological changes and headaches in past months. Symptoms worsened. H CT showed left temporoparietal cystic mass with surrounding edema and midline shift of 5 mm. Transferred to [facility] for ongoing care. 08/21  Admitted to ICU. No issues overnight. Passed a swallow. Given 1 dose of Decadron 10 mg. On decadron 4 mg every 6 hours. 08/22  No issues noted. Required 3 doses of antihypertensive agents. Did receive Tigan 2 doses. Much more alert this morning. Answers questions. Moving al extremities. Holding cup & drinking without difficulty    8/23: No acute events, asking appropriate questions, for OR this AM    PHYSICAL EXAM:    BP (!) 148/79   Pulse 81   Temp 98 °F (36.7 °C) (Oral)   Resp 16   SpO2 94%     General appearance:  Comfortable. Pain Description: none    GCS:    4 - Opens eyes on own   6 - Follows simple motor commands  5 - Alert and oriented    Pupil size:  Left 3 mm  Right 3 mm  Pupil reaction: Yes  Wiggles fingers: Left Yes Right Yes  Hand grasp:   Left normal     Right normal  Wiggles toes: Left Yes    Right Yes  Plantar flexion: Left normal    Right normal    CONSTITUTIONAL: no acute distress, lying in hospital bed,   NEUROLOGIC: PERRL, oriented x 4, moving all extremities without difficulty   CARDIOVASCULAR: S1 S2, regular rate, regular rhythm, no murmur/gallop/rub.  Monitor: sinus  PULMONARY: no rhonchi/rales/wheezes, no use of accessory muscles, RA  RENAL: crandall to gravity, clear yellow urine  ABDOMEN: soft, nontender, nondistended, nontympanic, normal bowel sounds   MUSCULOSKELETAL: moves all extremities purposefully, 5/5 strength   SKIN/EXTREMITIES: no rashes/ecchymosis, no edema/clubbing, warm/dry, good capillary refill       Past Medical History:   Diagnosis Date    Anemia     Asthma     Bipolar 1 disorder ([facility]     Depression     Diabetes mellitus ([facility] [facility]     Hypertension     PMB (postmenopausal bleeding)     Stress incontinence     Thyroid disease     nodules in thyroid- followed by Dr. Eliseo Faith. ASSESSMENT/PLAN:       · Neuro:  Brain mass. Monitor neuro status, Neurosurgery following, Keppra for seizure prophylaxis. Decadron, lithium, Paxil  · CV: No acute issues. Monitor hemodynamics. · Pulm: No acute issues. Monitor RR & SpO2. Pulmonary hygiene   · GI: No acute issues. NPO for OR. Monitor bowel function. · Renal: No acute issues. Monitor BUN & Cr. Monitor electrolytes & replace as needed. Monitor urine output. · ID: No acute issues   · Endocrine: Hyperglycemia. Monitor BS. ISS. · MSK: No acute issues. ROM. turn & reposition. · Heme: No acute issues. Monitor CBC. Bowel regime: Dulcolax   DVT prophylaxis: SCDs. No Lovenox/heparin until ok with neurosurgery. GI prophylaxis: Diet  Mouth/Eye care: as needed  Arciniega: Keep in place for critical care monitoring of fluid balance.     Family update: Updated at bedside   Code status:  Full  Disposition:  OR    Electronically signed by SKY Cohen CNP on 8/23/2021 at 10:34 AM

## 2021-08-24 ENCOUNTER — APPOINTMENT (OUTPATIENT)
Dept: CT IMAGING | Age: 56
DRG: 025 | End: 2021-08-24
Attending: INTERNAL MEDICINE
Payer: MEDICARE

## 2021-08-24 ENCOUNTER — ANESTHESIA (OUTPATIENT)
Dept: ICU | Age: 56
End: 2021-08-24

## 2021-08-24 ENCOUNTER — ANESTHESIA EVENT (OUTPATIENT)
Dept: ICU | Age: 56
End: 2021-08-24

## 2021-08-24 PROBLEM — K76.9 LIVER LESION: Status: ACTIVE | Noted: 2021-08-24

## 2021-08-24 PROBLEM — I48.0 PAROXYSMAL ATRIAL FIBRILLATION (HCC): Status: ACTIVE | Noted: 2021-08-24

## 2021-08-24 LAB
ALBUMIN SERPL-MCNC: 3.9 G/DL (ref 3.5–5.2)
ALP BLD-CCNC: 27 U/L (ref 35–104)
ALT SERPL-CCNC: 19 U/L (ref 0–32)
ANION GAP SERPL CALCULATED.3IONS-SCNC: 10 MMOL/L (ref 7–16)
AST SERPL-CCNC: 19 U/L (ref 0–31)
B.E.: -2.3 MMOL/L (ref -3–3)
BILIRUB SERPL-MCNC: 0.3 MG/DL (ref 0–1.2)
BUN BLDV-MCNC: 28 MG/DL (ref 6–20)
CALCIUM IONIZED: 1.29 MMOL/L (ref 1.15–1.33)
CALCIUM SERPL-MCNC: 9.3 MG/DL (ref 8.6–10.2)
CHLORIDE BLD-SCNC: 105 MMOL/L (ref 98–107)
CO2: 24 MMOL/L (ref 22–29)
COHB: 0.2 % (ref 0–1.5)
CREAT SERPL-MCNC: 0.7 MG/DL (ref 0.5–1)
CRITICAL: ABNORMAL
DATE ANALYZED: ABNORMAL
DATE OF COLLECTION: ABNORMAL
GFR AFRICAN AMERICAN: >60
GFR NON-AFRICAN AMERICAN: >60 ML/MIN/1.73
GLUCOSE BLD-MCNC: 171 MG/DL (ref 74–99)
HCO3: 24 MMOL/L (ref 22–26)
HCT VFR BLD CALC: 44 % (ref 34–48)
HEMOGLOBIN: 13.5 G/DL (ref 11.5–15.5)
HHB: 1.1 % (ref 0–5)
LAB: ABNORMAL
Lab: ABNORMAL
MAGNESIUM: 2.4 MG/DL (ref 1.6–2.6)
MCH RBC QN AUTO: 26.1 PG (ref 26–35)
MCHC RBC AUTO-ENTMCNC: 30.7 % (ref 32–34.5)
MCV RBC AUTO: 84.9 FL (ref 80–99.9)
METER GLUCOSE: 143 MG/DL (ref 74–99)
METER GLUCOSE: 148 MG/DL (ref 74–99)
METER GLUCOSE: 151 MG/DL (ref 74–99)
METER GLUCOSE: 152 MG/DL (ref 74–99)
METER GLUCOSE: 158 MG/DL (ref 74–99)
METER GLUCOSE: 164 MG/DL (ref 74–99)
METER GLUCOSE: 169 MG/DL (ref 74–99)
METHB: 0.4 % (ref 0–1.5)
MODE: ABNORMAL
O2 SATURATION: 98.9 % (ref 92–98.5)
O2HB: 98.3 % (ref 94–97)
OPERATOR ID: 2593
PATIENT TEMP: 37 C
PCO2: 47.1 MMHG (ref 35–45)
PDW BLD-RTO: 15.2 FL (ref 11.5–15)
PH BLOOD GAS: 7.33 (ref 7.35–7.45)
PHOSPHORUS: 3.4 MG/DL (ref 2.5–4.5)
PLATELET # BLD: 275 E9/L (ref 130–450)
PMV BLD AUTO: 11.2 FL (ref 7–12)
PO2: 159.6 MMHG (ref 75–100)
POTASSIUM SERPL-SCNC: 4.6 MMOL/L (ref 3.5–5)
RBC # BLD: 5.18 E12/L (ref 3.5–5.5)
SODIUM BLD-SCNC: 139 MMOL/L (ref 132–146)
SOURCE, BLOOD GAS: ABNORMAL
THB: 14.3 G/DL (ref 11.5–16.5)
TIME ANALYZED: 435
TOTAL PROTEIN: 6.9 G/DL (ref 6.4–8.3)
TROPONIN, HIGH SENSITIVITY: 8 NG/L (ref 0–9)
VANCOMYCIN TROUGH: 13.3 MCG/ML (ref 5–16)
WBC # BLD: 15.8 E9/L (ref 4.5–11.5)

## 2021-08-24 PROCEDURE — 85027 COMPLETE CBC AUTOMATED: CPT

## 2021-08-24 PROCEDURE — 82330 ASSAY OF CALCIUM: CPT

## 2021-08-24 PROCEDURE — 2580000003 HC RX 258: Performed by: NURSE PRACTITIONER

## 2021-08-24 PROCEDURE — C9113 INJ PANTOPRAZOLE SODIUM, VIA: HCPCS | Performed by: NURSE PRACTITIONER

## 2021-08-24 PROCEDURE — 84100 ASSAY OF PHOSPHORUS: CPT

## 2021-08-24 PROCEDURE — 2500000003 HC RX 250 WO HCPCS: Performed by: NURSE PRACTITIONER

## 2021-08-24 PROCEDURE — 2000000000 HC ICU R&B

## 2021-08-24 PROCEDURE — 82805 BLOOD GASES W/O2 SATURATION: CPT

## 2021-08-24 PROCEDURE — 84484 ASSAY OF TROPONIN QUANT: CPT

## 2021-08-24 PROCEDURE — 6360000002 HC RX W HCPCS: Performed by: INTERNAL MEDICINE

## 2021-08-24 PROCEDURE — 37799 UNLISTED PX VASCULAR SURGERY: CPT

## 2021-08-24 PROCEDURE — 80053 COMPREHEN METABOLIC PANEL: CPT

## 2021-08-24 PROCEDURE — 2700000000 HC OXYGEN THERAPY PER DAY

## 2021-08-24 PROCEDURE — 99232 SBSQ HOSP IP/OBS MODERATE 35: CPT | Performed by: NURSE PRACTITIONER

## 2021-08-24 PROCEDURE — 36415 COLL VENOUS BLD VENIPUNCTURE: CPT

## 2021-08-24 PROCEDURE — 6360000002 HC RX W HCPCS: Performed by: NURSE PRACTITIONER

## 2021-08-24 PROCEDURE — 2580000003 HC RX 258: Performed by: INTERNAL MEDICINE

## 2021-08-24 PROCEDURE — 83735 ASSAY OF MAGNESIUM: CPT

## 2021-08-24 PROCEDURE — 93005 ELECTROCARDIOGRAM TRACING: CPT | Performed by: NURSE PRACTITIONER

## 2021-08-24 PROCEDURE — 80202 ASSAY OF VANCOMYCIN: CPT

## 2021-08-24 PROCEDURE — 70450 CT HEAD/BRAIN W/O DYE: CPT

## 2021-08-24 PROCEDURE — 6370000000 HC RX 637 (ALT 250 FOR IP): Performed by: SURGERY

## 2021-08-24 PROCEDURE — APPSS15 APP SPLIT SHARED TIME 0-15 MINUTES: Performed by: NURSE PRACTITIONER

## 2021-08-24 PROCEDURE — 82962 GLUCOSE BLOOD TEST: CPT

## 2021-08-24 PROCEDURE — 6360000002 HC RX W HCPCS: Performed by: NEUROLOGICAL SURGERY

## 2021-08-24 PROCEDURE — 6360000002 HC RX W HCPCS

## 2021-08-24 PROCEDURE — 2500000003 HC RX 250 WO HCPCS: Performed by: SURGERY

## 2021-08-24 RX ORDER — SODIUM CHLORIDE 9 MG/ML
10 INJECTION INTRAVENOUS DAILY
Status: DISCONTINUED | OUTPATIENT
Start: 2021-08-24 | End: 2021-08-26

## 2021-08-24 RX ORDER — METOPROLOL TARTRATE 5 MG/5ML
2.5 INJECTION INTRAVENOUS ONCE
Status: COMPLETED | OUTPATIENT
Start: 2021-08-24 | End: 2021-08-24

## 2021-08-24 RX ORDER — MANNITOL 250 MG/ML
INJECTION, SOLUTION INTRAVENOUS
Status: DISCONTINUED
Start: 2021-08-24 | End: 2021-08-24

## 2021-08-24 RX ORDER — PANTOPRAZOLE SODIUM 40 MG/10ML
40 INJECTION, POWDER, LYOPHILIZED, FOR SOLUTION INTRAVENOUS DAILY
Status: DISCONTINUED | OUTPATIENT
Start: 2021-08-24 | End: 2021-08-26

## 2021-08-24 RX ORDER — MANNITOL 250 MG/ML
INJECTION, SOLUTION INTRAVENOUS
Status: COMPLETED
Start: 2021-08-24 | End: 2021-08-24

## 2021-08-24 RX ORDER — METOPROLOL TARTRATE 5 MG/5ML
2.5 INJECTION INTRAVENOUS EVERY 6 HOURS
Status: DISCONTINUED | OUTPATIENT
Start: 2021-08-24 | End: 2021-08-25

## 2021-08-24 RX ORDER — MANNITOL 250 MG/ML
30 INJECTION, SOLUTION INTRAVENOUS ONCE
Status: COMPLETED | OUTPATIENT
Start: 2021-08-24 | End: 2021-08-24

## 2021-08-24 RX ADMIN — Medication 10 ML: at 20:24

## 2021-08-24 RX ADMIN — HYDRALAZINE HYDROCHLORIDE 10 MG: 20 INJECTION INTRAMUSCULAR; INTRAVENOUS at 00:43

## 2021-08-24 RX ADMIN — VANCOMYCIN HYDROCHLORIDE 1750 MG: 10 INJECTION, POWDER, LYOPHILIZED, FOR SOLUTION INTRAVENOUS at 22:44

## 2021-08-24 RX ADMIN — INSULIN LISPRO 3 UNITS: 100 INJECTION, SOLUTION INTRAVENOUS; SUBCUTANEOUS at 09:42

## 2021-08-24 RX ADMIN — LEVETIRACETAM 750 MG: 1500 INJECTION, SOLUTION INTRAVENOUS at 17:11

## 2021-08-24 RX ADMIN — MORPHINE SULFATE 2 MG: 2 INJECTION, SOLUTION INTRAMUSCULAR; INTRAVENOUS at 15:51

## 2021-08-24 RX ADMIN — LITHIUM CARBONATE 300 MG: 300 CAPSULE ORAL at 10:00

## 2021-08-24 RX ADMIN — LEVETIRACETAM 750 MG: 1500 INJECTION, SOLUTION INTRAVENOUS at 04:33

## 2021-08-24 RX ADMIN — SODIUM CHLORIDE, PRESERVATIVE FREE 10 ML: 5 INJECTION INTRAVENOUS at 12:06

## 2021-08-24 RX ADMIN — LABETALOL HYDROCHLORIDE 10 MG: 5 INJECTION INTRAVENOUS at 08:26

## 2021-08-24 RX ADMIN — INSULIN LISPRO 3 UNITS: 100 INJECTION, SOLUTION INTRAVENOUS; SUBCUTANEOUS at 23:54

## 2021-08-24 RX ADMIN — METOPROLOL TARTRATE 2.5 MG: 1 INJECTION, SOLUTION INTRAVENOUS at 12:05

## 2021-08-24 RX ADMIN — DEXAMETHASONE SODIUM PHOSPHATE 4 MG: 4 INJECTION, SOLUTION INTRAMUSCULAR; INTRAVENOUS at 09:44

## 2021-08-24 RX ADMIN — AMLODIPINE BESYLATE 5 MG: 5 TABLET ORAL at 09:43

## 2021-08-24 RX ADMIN — METOPROLOL TARTRATE 2.5 MG: 1 INJECTION, SOLUTION INTRAVENOUS at 22:43

## 2021-08-24 RX ADMIN — INSULIN LISPRO 3 UNITS: 100 INJECTION, SOLUTION INTRAVENOUS; SUBCUTANEOUS at 16:26

## 2021-08-24 RX ADMIN — DEXAMETHASONE SODIUM PHOSPHATE 4 MG: 4 INJECTION, SOLUTION INTRAMUSCULAR; INTRAVENOUS at 20:24

## 2021-08-24 RX ADMIN — INSULIN LISPRO 3 UNITS: 100 INJECTION, SOLUTION INTRAVENOUS; SUBCUTANEOUS at 04:36

## 2021-08-24 RX ADMIN — VANCOMYCIN HYDROCHLORIDE 1750 MG: 10 INJECTION, POWDER, LYOPHILIZED, FOR SOLUTION INTRAVENOUS at 11:00

## 2021-08-24 RX ADMIN — LITHIUM CARBONATE 300 MG: 300 CAPSULE ORAL at 20:24

## 2021-08-24 RX ADMIN — INSULIN LISPRO 3 UNITS: 100 INJECTION, SOLUTION INTRAVENOUS; SUBCUTANEOUS at 00:42

## 2021-08-24 RX ADMIN — Medication 10 ML: at 09:44

## 2021-08-24 RX ADMIN — PAROXETINE 40 MG: 20 TABLET, FILM COATED ORAL at 09:45

## 2021-08-24 RX ADMIN — METOPROLOL TARTRATE 2.5 MG: 1 INJECTION, SOLUTION INTRAVENOUS at 17:01

## 2021-08-24 RX ADMIN — LABETALOL HYDROCHLORIDE 10 MG: 5 INJECTION INTRAVENOUS at 04:38

## 2021-08-24 RX ADMIN — METOPROLOL TARTRATE 2.5 MG: 1 INJECTION, SOLUTION INTRAVENOUS at 23:29

## 2021-08-24 RX ADMIN — MANNITOL 30 G: 250 INJECTION, SOLUTION INTRAVENOUS at 07:09

## 2021-08-24 RX ADMIN — INSULIN LISPRO 3 UNITS: 100 INJECTION, SOLUTION INTRAVENOUS; SUBCUTANEOUS at 20:23

## 2021-08-24 RX ADMIN — PANTOPRAZOLE SODIUM 40 MG: 40 INJECTION, POWDER, FOR SOLUTION INTRAVENOUS at 12:05

## 2021-08-24 RX ADMIN — DEXAMETHASONE SODIUM PHOSPHATE 4 MG: 4 INJECTION, SOLUTION INTRAMUSCULAR; INTRAVENOUS at 02:09

## 2021-08-24 RX ADMIN — DEXAMETHASONE SODIUM PHOSPHATE 4 MG: 4 INJECTION, SOLUTION INTRAMUSCULAR; INTRAVENOUS at 16:00

## 2021-08-24 RX ADMIN — HYDROMORPHONE HYDROCHLORIDE 0.5 MG: 1 INJECTION, SOLUTION INTRAMUSCULAR; INTRAVENOUS; SUBCUTANEOUS at 16:15

## 2021-08-24 RX ADMIN — METOPROLOL TARTRATE 2.5 MG: 1 INJECTION, SOLUTION INTRAVENOUS at 21:39

## 2021-08-24 RX ADMIN — SODIUM CHLORIDE: 9 INJECTION, SOLUTION INTRAVENOUS at 18:13

## 2021-08-24 RX ADMIN — INSULIN LISPRO 3 UNITS: 100 INJECTION, SOLUTION INTRAVENOUS; SUBCUTANEOUS at 14:06

## 2021-08-24 ASSESSMENT — PAIN DESCRIPTION - PAIN TYPE
TYPE: ACUTE PAIN
TYPE: ACUTE PAIN

## 2021-08-24 ASSESSMENT — PAIN - FUNCTIONAL ASSESSMENT
PAIN_FUNCTIONAL_ASSESSMENT: PREVENTS OR INTERFERES SOME ACTIVE ACTIVITIES AND ADLS
PAIN_FUNCTIONAL_ASSESSMENT: PREVENTS OR INTERFERES SOME ACTIVE ACTIVITIES AND ADLS

## 2021-08-24 ASSESSMENT — PAIN SCALES - GENERAL
PAINLEVEL_OUTOF10: 0
PAINLEVEL_OUTOF10: 7
PAINLEVEL_OUTOF10: 7

## 2021-08-24 ASSESSMENT — PAIN DESCRIPTION - DESCRIPTORS
DESCRIPTORS: HEADACHE
DESCRIPTORS: HEADACHE

## 2021-08-24 ASSESSMENT — PAIN DESCRIPTION - FREQUENCY
FREQUENCY: CONTINUOUS
FREQUENCY: CONTINUOUS

## 2021-08-24 ASSESSMENT — PAIN DESCRIPTION - LOCATION
LOCATION: HEAD
LOCATION: HEAD

## 2021-08-24 NOTE — PROGRESS NOTES
effect upon the left lateral ventricle and mild midline shift from left to right is stable. CT CHEST W CONTRAST    Result Date: 8/22/2021  EXAMINATION: CT OF THE CHEST WITH CONTRAST; CT OF THE ABDOMEN AND PELVIS WITH CONTRAST; CT OF THE CERVICAL SPINE WITHOUT CONTRAST 8/22/2021 10:20 am TECHNIQUE: CT of the chest was performed with the administration of intravenous contrast. Multiplanar reformatted images are provided for review. Dose modulation, iterative reconstruction, and/or weight based adjustment of the mA/kV was utilized to reduce the radiation dose to as low as reasonably achievable.; CT of the abdomen and pelvis was performed with the administration of intravenous contrast. Multiplanar reformatted images are provided for review. Dose modulation, iterative reconstruction, and/or weight based adjustment of the mA/kV was utilized to reduce the radiation dose to as low as reasonably achievable.; CT of the cervical spine was performed without the administration of intravenous contrast. Multiplanar reformatted images are provided for review. Dose modulation, iterative reconstruction, and/or weight based adjustment of the mA/kV was utilized to reduce the radiation dose to as low as reasonably achievable. COMPARISON: None. HISTORY: ORDERING SYSTEM PROVIDED HISTORY: Brain tumor TECHNOLOGIST PROVIDED HISTORY: Reason for exam:->Brain tumor What reading provider will be dictating this exam?->CRC FINDINGS: CT cervical spine. There is no acute fracture or dislocation in the cervical spine. There is diffuse advanced degenerative changes from C2-T1 with osteophytes and multilevel disc bulges. Large anterior osteophytes are also noted. The prevertebral soft tissues are normal.  A well corticated bony fragment is identified at the tip of the dense probably a previous ununited ossifications center versus old avulsion injury.   Note is made of a large cystic mass in the left temporal fossa measuring 6 x 4 cm likely necrotic malignancy. Brain abscess can have a similar appearance. No acute displaced fracture. Advanced diffuse degenerative changes with multilevel disc bulges from C2-T1. A large necrotic mass in the left temporal lobe. CT chest. There is cardiomegaly. The great vessels are normal.  Trachea and major bronchi are patent. There is atelectasis in the lung bases. There is mosaic pattern of ground-glass opacities which may be due to air trapping due to small airway disease. Edema or pneumonia are less likely. Degenerative changes are identified in the thoracic spine with bridging osteophytes. 1 1.1 cm right thyroid nodule is present. Consider ultrasonographic surveillance. Impression Cardiomegaly with mild diffuse ground-glass opacities and atelectasis likely due to air trapping with small airway disease. Pneumonia or edema are less likely. CT abdomen pelvis. There is hepatomegaly with liver measuring 18 cm with diffuse fatty infiltration. There is a 3.4 x 3.4 cm infiltrative hypodense lesion in the medial left hepatic lobe which is indeterminate for malignancy. Gallbladder is distended. Spleen appears normal.  1.1 cm low-attenuation lesion is identified in the body of pancreas of unknown etiology. Adrenals and the kidneys are normal.  Degenerative changes are noted in the lumbar spine. Fat containing umbilical hernia is noted. Pelvis. Bladder is contracted with a Arciniega catheter. Colon is collapsed. The appendix is normal. Impression Fatty liver with a 3.4 x 3.4 cm ill-defined indeterminate hypodense lesion in the medial left hepatic lobe. Developing malignancy is a consideration. Consider PET-CT scan. 1.1 cm indeterminate hypodense lesion in the body of pancreas. Surveillance is recommended.      CT CERVICAL SPINE WO CONTRAST    Result Date: 8/22/2021  EXAMINATION: CT OF THE CHEST WITH CONTRAST; CT OF THE ABDOMEN AND PELVIS WITH CONTRAST; CT OF THE CERVICAL SPINE WITHOUT CONTRAST 8/22/2021 10:20 am TECHNIQUE: CT of the chest was performed with the administration of intravenous contrast. Multiplanar reformatted images are provided for review. Dose modulation, iterative reconstruction, and/or weight based adjustment of the mA/kV was utilized to reduce the radiation dose to as low as reasonably achievable.; CT of the abdomen and pelvis was performed with the administration of intravenous contrast. Multiplanar reformatted images are provided for review. Dose modulation, iterative reconstruction, and/or weight based adjustment of the mA/kV was utilized to reduce the radiation dose to as low as reasonably achievable.; CT of the cervical spine was performed without the administration of intravenous contrast. Multiplanar reformatted images are provided for review. Dose modulation, iterative reconstruction, and/or weight based adjustment of the mA/kV was utilized to reduce the radiation dose to as low as reasonably achievable. COMPARISON: None. HISTORY: ORDERING SYSTEM PROVIDED HISTORY: Brain tumor TECHNOLOGIST PROVIDED HISTORY: Reason for exam:->Brain tumor What reading provider will be dictating this exam?->CRC FINDINGS: CT cervical spine. There is no acute fracture or dislocation in the cervical spine. There is diffuse advanced degenerative changes from C2-T1 with osteophytes and multilevel disc bulges. Large anterior osteophytes are also noted. The prevertebral soft tissues are normal.  A well corticated bony fragment is identified at the tip of the dense probably a previous ununited ossifications center versus old avulsion injury. Note is made of a large cystic mass in the left temporal fossa measuring 6 x 4 cm likely necrotic malignancy. Brain abscess can have a similar appearance. No acute displaced fracture. Advanced diffuse degenerative changes with multilevel disc bulges from C2-T1. A large necrotic mass in the left temporal lobe. CT chest. There is cardiomegaly.   The great vessels are normal. Trachea and major bronchi are patent. There is atelectasis in the lung bases. There is mosaic pattern of ground-glass opacities which may be due to air trapping due to small airway disease. Edema or pneumonia are less likely. Degenerative changes are identified in the thoracic spine with bridging osteophytes. 1 1.1 cm right thyroid nodule is present. Consider ultrasonographic surveillance. Impression Cardiomegaly with mild diffuse ground-glass opacities and atelectasis likely due to air trapping with small airway disease. Pneumonia or edema are less likely. CT abdomen pelvis. There is hepatomegaly with liver measuring 18 cm with diffuse fatty infiltration. There is a 3.4 x 3.4 cm infiltrative hypodense lesion in the medial left hepatic lobe which is indeterminate for malignancy. Gallbladder is distended. Spleen appears normal.  1.1 cm low-attenuation lesion is identified in the body of pancreas of unknown etiology. Adrenals and the kidneys are normal.  Degenerative changes are noted in the lumbar spine. Fat containing umbilical hernia is noted. Pelvis. Bladder is contracted with a Arciniega catheter. Colon is collapsed. The appendix is normal. Impression Fatty liver with a 3.4 x 3.4 cm ill-defined indeterminate hypodense lesion in the medial left hepatic lobe. Developing malignancy is a consideration. Consider PET-CT scan. 1.1 cm indeterminate hypodense lesion in the body of pancreas. Surveillance is recommended. CT ABDOMEN PELVIS W IV CONTRAST Additional Contrast? Radiologist Recommendation    Result Date: 8/22/2021  EXAMINATION: CT OF THE CHEST WITH CONTRAST; CT OF THE ABDOMEN AND PELVIS WITH CONTRAST; CT OF THE CERVICAL SPINE WITHOUT CONTRAST 8/22/2021 10:20 am TECHNIQUE: CT of the chest was performed with the administration of intravenous contrast. Multiplanar reformatted images are provided for review.  Dose modulation, iterative reconstruction, and/or weight based adjustment of the mA/kV was utilized to reduce the radiation dose to as low as reasonably achievable.; CT of the abdomen and pelvis was performed with the administration of intravenous contrast. Multiplanar reformatted images are provided for review. Dose modulation, iterative reconstruction, and/or weight based adjustment of the mA/kV was utilized to reduce the radiation dose to as low as reasonably achievable.; CT of the cervical spine was performed without the administration of intravenous contrast. Multiplanar reformatted images are provided for review. Dose modulation, iterative reconstruction, and/or weight based adjustment of the mA/kV was utilized to reduce the radiation dose to as low as reasonably achievable. COMPARISON: None. HISTORY: ORDERING SYSTEM PROVIDED HISTORY: Brain tumor TECHNOLOGIST PROVIDED HISTORY: Reason for exam:->Brain tumor What reading provider will be dictating this exam?->CRC FINDINGS: CT cervical spine. There is no acute fracture or dislocation in the cervical spine. There is diffuse advanced degenerative changes from C2-T1 with osteophytes and multilevel disc bulges. Large anterior osteophytes are also noted. The prevertebral soft tissues are normal.  A well corticated bony fragment is identified at the tip of the dense probably a previous ununited ossifications center versus old avulsion injury. Note is made of a large cystic mass in the left temporal fossa measuring 6 x 4 cm likely necrotic malignancy. Brain abscess can have a similar appearance. No acute displaced fracture. Advanced diffuse degenerative changes with multilevel disc bulges from C2-T1. A large necrotic mass in the left temporal lobe. CT chest. There is cardiomegaly. The great vessels are normal.  Trachea and major bronchi are patent. There is atelectasis in the lung bases. There is mosaic pattern of ground-glass opacities which may be due to air trapping due to small airway disease. Edema or pneumonia are less likely.   Degenerative chloride flush  10 mL Intravenous Once    sodium chloride flush  5-40 mL Intravenous 2 times per day    amLODIPine  5 mg Oral Daily    lithium  300 mg Oral 4x Daily    PARoxetine  40 mg Oral QAM    insulin lispro  0-18 Units Subcutaneous Q4H    dexamethasone  4 mg Intravenous Q6H    levetiracetam  750 mg Intravenous Q12H     Remains aphasic, follows simple commands moves all fours, opens eyes,head ct with postop changes  Assessment:  Patient Active Problem List   Diagnosis    Morbid obesity ([address]    Brain mass    Brain bleed ([address]    Essential hypertension    Depression    Leukocytosis    AMS (altered mental status)    Morbid obesity with BMI of 50.0-59.9, adult ([address]    Hyponatremia     Plan:Continue current care  Luzma Evans MD M.D.

## 2021-08-24 NOTE — PROGRESS NOTES
Patient in Afib RVR with rate 120. Patient asleep. Lina King NP made aware with new orders noted. EKG obtained and given to mellisa. Further orders noted.

## 2021-08-24 NOTE — PROGRESS NOTES
Pharmacy Consultation Note  (Antibiotic Dosing and Monitoring)    Initial consult date: 8/23/21  Consulting provider: Casandra MACKEY  Drug: Vancomycin  Indication: Prophylaxis     Age/  Gender Height Weight IBW  Allergy Information   56 y.o./female 5' 4\" (162.6 cm) (per 8/20 docuementation)162.6 cm (!) 332 lb (150.6 kg) (per 8/20 documentation)  150.6 kg   Ideal body weight: 54.7 kg (120 lb 9.5 oz)  Adjusted ideal body weight: 93.1 kg (205 lb 2.5 oz)   Clindamycin/lincomycin, Iodine, Penicillins, and Sulfa antibiotics      Renal Function:  Recent Labs     08/22/21  0520 08/22/21  0520 08/23/21  0425 08/23/21  1156 08/24/21  0430   BUN 16  --  20  --  28*   CREATININE 0.6   < > 0.7 0.7 0.7    < > = values in this interval not displayed. Intake/Output Summary (Last 24 hours) at 8/24/2021 1034  Last data filed at 8/24/2021 0849  Gross per 24 hour   Intake 2160 ml   Output 1795 ml   Net 365 ml       Vancomycin Monitoring:  Trough:  No results for input(s): VANCOTROUGH in the last 72 hours. Random:  No results for input(s): VANCORANDOM in the last 72 hours. Vancomycin Administration Times:  Recent vancomycin administrations                   vancomycin (VANCOCIN) 1,750 mg in sodium chloride 0.9 % 500 mL IVPB (mg) 1,750 mg New Bag 08/23/21 2313    vancomycin (VANCOCIN) injection (mg) 500 mg Given 08/23/21 1154    vancomycin (VANCOCIN) 2,000 mg in dextrose 5 % 500 mL IVPB (mg) 2,000 mg New Bag 08/23/21 1020                Assessment:  · Patient is a 60 y.o. female who has been initiated on vancomycin  Estimated Creatinine Clearance: 129 mL/min (based on SCr of 0.7 mg/dL). · Goal trough = 15 - 20 mcg/ml;  Goal AUC/EZRA 400-600  · Received vancomycin 2000 mg IV x 1 pre-operatively yesterday    Plan:  · Continue Vancomycin 1750mg IV every 12 hours  · Will check vancomycin trough levels with the 22:30 dose tonight and will adjust as needed  · Will continue to monitor renal function   · Clinical pharmacy to follow    Brittany Nguyen, PharmD, BCPS, BCCCP  8/24/2021  10:36 AM

## 2021-08-24 NOTE — PROGRESS NOTES
Palliative Care Department  [phone]  Palliative Care Progress Note  Provider SKY Hood CNP    Shobha Canales  [id number]  Hospital Day: 4  Date of Initial Consult: 8/21/2021  Referring Provider: Tiffani MACKEY  Palliative Medicine was consulted for assistance with: Elizabeth Ansari, family support    Brief Summary of Hospital Course:   Shobha Canales is a 60 y.o. with a medical history of bipolar 1, Type 2 DM, anemia who was admitted on 8/21/2021 from home with a CHIEF COMPLAINT of n/v, weakness, altered mental status, headache. HPI: \"The patient is a 60 y.o. female of Lydia Reid MD with significant past medical history of anemia, bipolar 1, depression, type 2 diabetes not on long-term insulin who presents with nausea, vomiting, headache, expressive aphasia. History is obtained mostly from the  at the bedside and another family member. They state over the last 48 hours she has had progressive nausea, vomiting, difficulty interacting and progressive weakness with altered mental status. They state this is very much unlike her. Patient was brought into the ER and underwent CT head which showed a 6.4 x 4.4 space-occupying cystic mass in the left temporal region with marked surrounding edema resulting in mass-effect and midline shift by 5 mm. There was a questionable focal bleed in the area as well. Patient was started on Decadron, admitted to ICU and neurosurgery was consulted. Since coming up to the ICU, her nausea continues, but her interactiveness is improved per the family. \" She has a craniotomy with brain biopsy performed on 8/23.      ASSESSMENT/PLAN:     Pertinent Hospital Problems      Brain mass with cerebral edema and mass effect   Intraparenchymal hemorrhage   Acute metabolic encephalopathy   Type 2 DM   Morbid obesity   Essential hypertension    Palliative Care Encounter / Counseling Regarding Goals of Care  Please see detailed goals of care discussion as present  Skin:  Warm and dry, no rashes on visible skin  Psych: non-anxious affect  Neuro:  Alert to voice,  Follows simple commands    Objective data reviewed: labs, images, records, medication use, vitals and chart      Discussed patient and the plan of care with the other IDT members: Palliative Medicine IDT Team, Floor Nurse, Patient and Family    Time/Communication  Greater than 50% of time spent, total 25 minutes in counseling and coordination of care at the bedside regarding goals of care, diagnosis and prognosis and see above. Thank you for allowing Palliative Medicine to participate in the care of [facility].

## 2021-08-24 NOTE — PROGRESS NOTES
Heart rate 130's on monitor. Patient asleep. ST depression noted. Aashish Pyle NP aware and Dr. Colby Bowman with new orders noted.

## 2021-08-24 NOTE — PLAN OF CARE
Problem: Discharge Planning:  Goal: Discharged to appropriate level of care  Description: Discharged to appropriate level of care  Outcome: Not Met This Shift     Problem: Verbal Communication - Impaired:  Goal: Functional communication will improve  Description: Functional communication will improve  Outcome: Ongoing  Goal: Ability to interact with others will improve  Description: Ability to interact with others will improve  Outcome: Ongoing  Goal: Ability to establish a method of communication will improve  Description: Ability to establish a method of communication will improve  Outcome: Ongoing     Problem: Pain:  Goal: Pain level will decrease  Description: Pain level will decrease  Outcome: Met This Shift  Goal: Recognizes and communicates pain  Description: Recognizes and communicates pain  Outcome: Met This Shift     Problem: Mobility - Impaired:  Goal: Able to ambulate independently  Description: Able to ambulate independently  Outcome: Not Met This Shift  Goal: Able to ambulate with minimal assistance  Description: Able to ambulate with minimal assistance  Outcome: Not Met This Shift  Goal: Ability to appropriately use an adaptive device for ambulation will improve  Description: Ability to appropriately use an adaptive device for ambulation will improve  Outcome: Not Met This Shift  Goal: Able to verbalize acceptance of life and situations over which he or she has no control  Description: Absence of contracture deformity  Outcome: Ongoing     Problem: Autonomic Dysreflexia - Risk of:  Goal: Absence of autonomic dysreflexia signs and symptoms  Description: Absence of autonomic dysreflexia signs and symptoms  Outcome: Met This Shift     Problem: Nutrition Deficit - Risk of:  Goal: Ability to achieve adequate nutritional intake will improve  Description: Ability to achieve adequate nutritional intake will improve  Outcome: Not Met This Shift  Goal: Maintenance of adequate weight for body size and type will improve to within specified parameters  Description: Maintenance of adequate weight for body size and type will improve to within specified parameters  Outcome: Ongoing     Problem: Swallowing - Impaired:  Goal: Able to swallow without choking  Description: Able to swallow without choking  Outcome: Ongoing  Goal: Absence of aspiration  Description: Absence of aspiration  Outcome: Met This Shift     Problem: Aspiration - Risk of:  Goal: Absence of aspiration  Description: Absence of aspiration  Outcome: Met This Shift     Problem: Respiratory Function - Compromised:  Goal: Absence of pulmonary infection  Description: Absence of pulmonary infection  Outcome: Met This Shift  Goal: Levels of oxygenation will improve  Description: Levels of oxygenation will improve  Outcome: Met This Shift  Goal: Increase in ventilator-free time  Description: Increase in ventilator-free time  Outcome: Met This Shift  Goal: Ability to maintain a clear airway will improve  Description: Ability to maintain a clear airway will improve  Outcome: Met This Shift     Problem:  Bowel Function - Altered:  Goal: Bowel elimination is within specified parameters  Description: Bowel elimination is within specified parameters  Outcome: Ongoing  Goal: Control of bowel function will improve  Description: Control of bowel function will improve  Outcome: Ongoing  Goal: Ability to maintain normal bowel function will improve  Description: Ability to maintain normal bowel function will improve  Outcome: Ongoing     Problem: Urinary Elimination - Impaired:  Goal: Urinary elimination within specified parameters  Description: Urinary elimination within specified parameters  Outcome: Met This Shift  Goal: Absence of postvoid residual urine  Description: Absence of postvoid residual urine  Outcome: Not Met This Shift  Goal: Absence of incontinence - Urinary  Description: Absence of incontinence - Urinary  Outcome: Met This Shift  Goal: Reports of episodes of free from falls  Description: Will remain free from falls  Outcome: Met This Shift  Goal: Absence of physical injury  Description: Absence of physical injury  Outcome: Met This Shift

## 2021-08-24 NOTE — PROGRESS NOTES
Surgical  Neuro Science Intensive Care Unit  Critical Care  Daily Progress Note 8/24/2021    Date of Admission: 08/21/2021    CC: Follow up for AMS and brain mass    HOSPITAL COURSE/OVERNIGHT EVENTS:    08/20  Presented to [facility] ED for AMS & vomiting. Expressive aphasia. Has had progressive neurological changes and headaches in past months. Symptoms worsened. H CT showed left temporoparietal cystic mass with surrounding edema and midline shift of 5 mm. Transferred to [facility] for ongoing care. 08/21  Admitted to ICU. No issues overnight. Passed a swallow. Given 1 dose of Decadron 10 mg. On decadron 4 mg every 6 hours. 08/22  No issues noted. Required 3 doses of antihypertensive agents. Did receive Tigan 2 doses. Much more alert this morning. Answers questions. Moving al extremities. Holding cup & drinking without difficulty   08/23  No acute events, asking appropriate questions, for OR this AM  08/24   Afebrile. ICP ranges to 21. Mannitol given this am.  Required 12 units of insulin coverage. Received more than 12 doses of prn antihypertensive agents. PHYSICAL EXAM:  /66   Pulse 76   Temp 97.6 °F (36.4 °C) (Oral)   Resp 13   SpO2 98%      Intake/Output Summary (Last 24 hours) at 8/24/2021 0801  Last data filed at 8/24/2021 0700  Gross per 24 hour   Intake 2660 ml   Output 1655 ml   Net 1005 ml     General appearance:  Comfortable. Pain Description: none    NEUROLOGIC:   RASS Score:  0  GCS:  14  4 - Opens eyes on own   6 - Follows simple motor commands  4 - Seems confused, disoriented  Eyes open. Answers questions with word salad. Does follow command more do on right than left. She moves left upper extremity with much encouragement. Bilateral toe wiggle.       Pupil size:  Left 3 mm  Right 3 mm  Pupil reaction: Yes   PERRLA  Wiggles fingers: Left   Yes  Right Yes  Hand grasp:   Left: Yes     Right    Yes  Wiggles toes: Left   Yes Right  Yes  Plantar flexion: Left  Yes  Right Yes  Facial droop:   none   Speech:  clear    Crani incision:  CDI. ICP:  15-21 mm Hg. CPP:  56-64 mm Hg. CONSTITUTIONAL: No acute distress, lying in hospital bed. CARDIOVASCULAR: S1 S2, regular rate, regular rhythm, no murmur/gallop/rub. Monitor: NSR. PULMONARY: Bilaterally clear. No rhonchi/rales/wheezes, no use of accessory muscles. RENAL:  Arciniega to gravity, clear yellow urine. Fluid balance for previous 24 hours: + 945 ml. ABDOMEN: Soft, nontender, nondistended, nontympanic, normal bowel sounds. Drinknt contrast for CT scan. No reported nausea or vomiting. MUSCULOSKELETAL:  Moves all extremities to command weakly. SKIN/EXTREMITIES: No rashes/ecchymosis, no edema/clubbing, warm/dry, good capillary refill. LINES:   Peripheral     Arterial line. Right radial.  Day 2. Good waveform/     Recent Labs     08/22/21  0520 08/23/21  0425 08/24/21  0430   WBC 11.5 11.3 15.8*   HGB 14.0 13.8 13.5   HCT 44.3 44.1 44.0   MCV 84.2 85.6 84.9    274 275       Recent Labs     08/22/21  0520 08/22/21  0520 08/23/21  0425 08/23/21  1156 08/24/21  0430     --  138  --  139   K 4.5   < > 4.6 4.2 4.6   CO2 25  --  26  --  24   PHOS 3.2  --  2.7  --  3.4   BUN 16  --  20  --  28*   CREATININE 0.6   < > 0.7 0.7 0.7    < > = values in this interval not displayed. Recent Labs     08/22/21  0520 08/22/21  0645 08/23/21  0425 08/24/21  0430   PROT 7.3  --  7.0 6.9   INR  --  1.1  --   --      ASSESSMENT/PLAN:     Principal Problem:    Brain mass  Active Problems:    Brain bleed (HCC)    Essential hypertension    Depression    Leukocytosis    AMS (altered mental status)    Morbid obesity with BMI of 50.0-59.9, adult (HCC)    Hyponatremia  Resolved Problems:    * No resolved hospital problems. 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 S/P Craniotomy for brain biopsy. Hx of bipolar disorder & depression. Monitor neuro status. Neurosurgery following.   Eduardo Rader for seizure prophylaxis. Lithium. Paxil. Decadron. HCT this am.    CV:  No acute issues. Hx of HTN    Monitor hemodynamics. BP goal systolic less than 974 mm Hg. PRN hydralzine & labetalol. Norvasc. Pulm: No acute issues. Monitor RR & SpO2. O2 as needed. PRN  Albuterol. Encourage cough, SMI  & deep breathing. GI: No acute issues. Morbid obesity. BMI 56. .    Monitor bowel function. NPO. Tigan. Scopolamine patch. Renal:  No acute issues. Hx of stress incontinence. Monitor BUN & Cr, electrolytes & replace as needed. Monitor I & O. Arciniega. ID: Leukocytosis. Vancomycin. Day 3. Endocrine: Hyperglycemia. Hx of diabetes & hypothyroid nodules. Monitor BS.    ISS.       MSK:  Deconditioned.   ROM. Turn & reposition. PT & OT pending recommendations. Monitor for skin breakdown. Heme: No acute issues. Hx of anemia   Monitor CBC. Bowel regime: Bisacodyl   Pain control/Sedation:  Morphine,. Benadryl. DVT prophylaxis: SCD   GI prophylaxis: Protonix  Glucose protocol:  ISS  Arciniega: Keep in place for critical care monitoring of fluid balance. Ancillary consults:  Medicine. Neurosurgery. Palliative care. Critical care. Patient/Family update:  at bedside. Questions answered. Sister at bedside. Spoke with Dr. Sascha Hidalgo on the phone. Code status:   Full code. Disposition: Continue ICU.       Electronically signed by Tiffani Barajas RN MSN APRN-NP [facility] NP  MARIA ISABEL CCRN 8/24/2021 10:25 AM

## 2021-08-24 NOTE — PROGRESS NOTES
Coordination of care discussion and chart review with PM team. LSW met at bedside with pts  and her sister. Pt is awake and speaking at times to family. Psychosocial support provided as pt remains in icu s/p crani for brain mass.  will remain available for on-going psychosocial support, as needed.

## 2021-08-24 NOTE — CARE COORDINATION
Transition of Care-Met with patient and  Juan Markham bedside for initial assessment. Patient was unable to participate in conversation-all questions answered by her . The couple lives in a ranch style home, one step to enter residence, patient was independent prior to admission, no hx: DME, SNF, or HHC. PCP is Dr. Bettina Dockery, preferred pharmacy is [address] [place] [address] in [place].  would like for patient to return home at discharge. PT/OT ordered to assist in discharge planning.  will be able to provide transportation home.     Onelia Prajapati BSN, RN  [facility]   [phone]

## 2021-08-24 NOTE — OP NOTE
[address]                  [address] [facility],  [facility]                                OPERATIVE REPORT    PATIENT NAME: Jayson Hampton                     :        1965  MED REC NO:   [id number]                            ROOM:       [id number]  ACCOUNT NO:   [de-identified]                           ADMIT DATE: 2021  PROVIDER:     Jose Mejia MD    DATE OF PROCEDURE:  2021    PREOPERATIVE DIAGNOSIS:  Left temporoparietal brain mass. POSTOPERATIVE DIAGNOSIS:  Left temporoparietal glioma. PROCEDURE PERFORMED:  Frameless stereotactic-guided left temporoparietal  craniotomy for biopsy of brain mass. ATTENDING SURGEON:  Jose Mejia MD    ANESTHESIA:  General endotracheal.    INDICATION:  This lady presented with a large brain mass in the left  temporoparietal region. It was a large cyst with a mural nodule. She  underwent preoperative imaging of the head with contrast for  stereotactic-guidance to be used in the OR. She had a large cyst with a  mural nodule. Decision was made to take her to the OR for craniotomy  and biopsy. DESCRIPTION OF PROCEDURE:  Following induction of general endotracheal  anesthesia, she was turned to the right lateral decubitus position with  an axillary roll on a beanbag. Arms and legs were padded as necessary. Time-out obtained by the entire team.  Consent was obtained as to the  procedure to be performed. Left frontoparietal and temporal regions  were shaved, prepped and draped per routine fashion. After fixing her  head in a 3-pin Cooley head ochoa, frameless stereotactic system was  used throughout the procedure to guide the location of the flap. A  horseshoe shaped flap was made just above the left ear, centered on the  brain mass as guided by frameless stereotaxy system.   The skin and  temporalis muscle were turned inferiorly and held with fish hook  retractor system, craniotome used to place multiple bur holes, free bone  flap was turned. Dura was opened based towards superiorly. There was  quite a bit of brain bulging out the defect. A small corticectomy was  made over the mural nodule and a large amount of xanthochromic fluid  came out of the cyst.  Frameless stereotactic system was used to  identify the location of the mural nodule. Multiple frozen sections  were obtained. Pathologist stated _____ is highly cellular, likely  glioma; however, he cannot determine the precise grade. He did not see  necrosis or neovascularity and would need permanent pathology to  determine the exact grade of the tumor. We proceeded to remove as much  of the mural nodule as I felt was safe for the patient. Sent multiple  specimens for permanent section. Obtained meticulous hemostasis, closed  the dura, put a post-craniotomy ICP monitor in place, put some Duragen  over the dura with the bone flap back into position in a standard  fashion and closed in layers. Estimated blood loss 300 mL. No apparent  complications. The patient was extubated and taken back to ICU in  stable condition. Sponge and needle counts correct at the end of the  case.           Ladarius Monson MD    D: 08/23/2021 16:30:23       T: 08/23/2021 16:34:02     [email or web address]  Job#: [id number]     Doc#: [id number]    CC:

## 2021-08-24 NOTE — PROGRESS NOTES
Notified of EKG rhythm change of Afib. 12 lead EKG done. Metoprolol ordered but converted back to SR before dose could given. Will start metoprolol IV 2.5 mg every 6 hours.

## 2021-08-24 NOTE — PLAN OF CARE
Problem: Discharge Planning:  Goal: Discharged to appropriate level of care  Description: Discharged to appropriate level of care  8/23/2021 2336 by Benjamín Moon RN  Outcome: Met This Shift     Problem: Pain:  Goal: Pain level will decrease  Description: Pain level will decrease  8/23/2021 2336 by Benjamín Moon RN  Outcome: Met This Shift     Problem: Autonomic Dysreflexia - Risk of:  Goal: Absence of autonomic dysreflexia signs and symptoms  Description: Absence of autonomic dysreflexia signs and symptoms  8/23/2021 2336 by Benjamín Moon RN  Outcome: Met This Shift     Problem: Injury - Risk of, Healthcare-Acquired Condition:  Goal: Will remain free from falls  Description: Will remain free from falls  8/23/2021 2336 by Benjamín Moon RN  Outcome: Met This Shift     Problem: Injury - Risk of, Healthcare-Acquired Condition:  Goal: Absence of pressure ulcer  Description: Absence of pressure ulcer  8/23/2021 2336 by Benjamín Moon RN  Outcome: Met This Shift     Problem: Aspiration - Risk of:  Goal: Absence of aspiration  Description: Absence of aspiration  8/23/2021 2336 by Benjamín Moon RN  Outcome: Met This Shift     Problem: Verbal Communication - Impaired:  Goal: Functional communication will improve  Description: Functional communication will improve  8/23/2021 2336 by Benjamín Moon RN  Outcome: Ongoing

## 2021-08-24 NOTE — PROGRESS NOTES
Chief Complaint:  Brain mass     Subjective:    Developed short run PAF this morning, converted spontaneously to NSR. Currently very aphasic. Seems to deny significant pain. Objective:    BP (!) 129/52   Pulse 130   Temp 98.3 °F (36.8 °C) (Oral)   Resp 13   Ht 5' 4\" (1.626 m) Comment: per 8/20 docuementation  Wt (!) 332 lb (150.6 kg) Comment: per 8/20 documentation  SpO2 96%   BMI 56.99 kg/m²     Current medications that patient is taking have been reviewed. General appearance: Nontoxic, morbidly obese  HEENT: dressing on head, MMM  Neck: FROM, supple  Lungs: Clear to auscultation anteriorly, WOB normal  CV: RRR, no MRGs  Abdomen: Soft, non-tender; no masses or HSM, +BS  Extremities: No peripheral edema or digital cyanosis  Skin: no rash, lesions or ulcers  Psych: Calm and cooperative  Neuro: Alert and interactive, face symmetric, very aphasic, R arm weak, moving other extremities though difficult to gauge true strength due to weak effort.     Labs:  CBC with Differential:    Lab Results   Component Value Date    WBC 15.8 08/24/2021    RBC 5.18 08/24/2021    RBC 4.68 04/26/2017    HGB 13.5 08/24/2021    HCT 44.0 08/24/2021     08/24/2021    MCV 84.9 08/24/2021    MCH 26.1 08/24/2021    MCHC 30.7 08/24/2021    RDW 15.2 08/24/2021    LYMPHOPCT 7.8 08/20/2021    MONOPCT 1.9 08/20/2021    BASOPCT 0.2 08/20/2021    MONOSABS 0.26 08/20/2021    LYMPHSABS 1.06 08/20/2021    EOSABS 0.00 08/20/2021    BASOSABS 0.03 08/20/2021     CMP:    Lab Results   Component Value Date     08/24/2021    K 4.6 08/24/2021    K 4.7 08/21/2021     08/24/2021    CO2 24 08/24/2021    BUN 28 08/24/2021    CREATININE 0.7 08/24/2021    GFRAA >60 08/24/2021    LABGLOM >60 08/24/2021    GLUCOSE 171 08/24/2021    PROT 6.9 08/24/2021    LABALBU 3.9 08/24/2021    CALCIUM 9.3 08/24/2021    BILITOT 0.3 08/24/2021    ALKPHOS 27 08/24/2021    AST 19 08/24/2021    ALT 19 08/24/2021        Imaging:  I've personally reviewed the patient's CT head  1.  1st postoperative study after left frontal parietal temporal craniotomy. The 1st postoperative study after resection of a large expansile cystic mass   lesion in the left temporal region.       2. Significant reduction of mass effect in the left cerebral hemisphere.  The   mild diminish the midline shift to the right, presently is about the 3.2 cm.       3.  Postoperative changes in the site of the previous lesion with areas of   hemorrhage in the surgical site the brain parenchyma.  Also postoperative   changes are seen in the left subdural space.       4.  More likely an entrapped air/fluid accumulation towards the mid anterior   aspect of the left temporal lobe measuring 1.7 x 2.4 x 1.7 cm.  This finding   can be addition evaluated the when patient condition permits in a more late   postoperative phase with an MRI study of the brain without and with IV   contrast.         Telemetry:  I've personally reviewed the patient's telemetry:  <1 hour PAF this morning, converted back to NSR currently    I've personally reviewed the patient's EKG:  A-fib  no acute ischemic changes    Assessment/Plan:  Principal Problem:    Brain mass  Active Problems:    Brain bleed (HCC)    Essential hypertension    Depression    Leukocytosis    AMS (altered mental status)    Morbid obesity with BMI of 50.0-59.9, adult (HCC)    Hyponatremia    Paroxysmal atrial fibrillation (HCC)    Liver lesion - INCIDENTAL  Resolved Problems:    * No resolved hospital problems. *       Await final path. Continue post op care per Neurosurgery    New onset PAF.  states this is a new diagnosis.   Likely provoked by post op stress state    Low dose IV metoprolol    TTE    Obviously no anticoagulation    Will need outpatient rhythm monitoring    Mary Tam MD    12 PM  8/24/2021

## 2021-08-25 LAB
ALBUMIN SERPL-MCNC: 3.7 G/DL (ref 3.5–5.2)
ALP BLD-CCNC: 25 U/L (ref 35–104)
ALT SERPL-CCNC: 24 U/L (ref 0–32)
ANION GAP SERPL CALCULATED.3IONS-SCNC: 15 MMOL/L (ref 7–16)
ANION GAP SERPL CALCULATED.3IONS-SCNC: 5 MMOL/L (ref 7–16)
AST SERPL-CCNC: 23 U/L (ref 0–31)
BILIRUB SERPL-MCNC: 0.4 MG/DL (ref 0–1.2)
BUN BLDV-MCNC: 21 MG/DL (ref 6–20)
BUN BLDV-MCNC: 23 MG/DL (ref 6–20)
CALCIUM IONIZED: 1.28 MMOL/L (ref 1.15–1.33)
CALCIUM IONIZED: 1.37 MMOL/L (ref 1.15–1.33)
CALCIUM SERPL-MCNC: 8.5 MG/DL (ref 8.6–10.2)
CALCIUM SERPL-MCNC: 9.2 MG/DL (ref 8.6–10.2)
CHLORIDE BLD-SCNC: 107 MMOL/L (ref 98–107)
CHLORIDE BLD-SCNC: 109 MMOL/L (ref 98–107)
CO2: 20 MMOL/L (ref 22–29)
CO2: 27 MMOL/L (ref 22–29)
CREAT SERPL-MCNC: 0.6 MG/DL (ref 0.5–1)
CREAT SERPL-MCNC: 0.6 MG/DL (ref 0.5–1)
EKG ATRIAL RATE: 110 BPM
EKG Q-T INTERVAL: 302 MS
EKG QRS DURATION: 88 MS
EKG QTC CALCULATION (BAZETT): 430 MS
EKG R AXIS: -12 DEGREES
EKG T AXIS: 23 DEGREES
EKG VENTRICULAR RATE: 122 BPM
GFR AFRICAN AMERICAN: >60
GFR AFRICAN AMERICAN: >60
GFR NON-AFRICAN AMERICAN: >60 ML/MIN/1.73
GFR NON-AFRICAN AMERICAN: >60 ML/MIN/1.73
GLUCOSE BLD-MCNC: 179 MG/DL (ref 74–99)
GLUCOSE BLD-MCNC: 181 MG/DL (ref 74–99)
HCT VFR BLD CALC: 41.2 % (ref 34–48)
HEMOGLOBIN: 12.6 G/DL (ref 11.5–15.5)
MAGNESIUM: 2.3 MG/DL (ref 1.6–2.6)
MAGNESIUM: 2.4 MG/DL (ref 1.6–2.6)
MCH RBC QN AUTO: 26.5 PG (ref 26–35)
MCHC RBC AUTO-ENTMCNC: 30.6 % (ref 32–34.5)
MCV RBC AUTO: 86.7 FL (ref 80–99.9)
METER GLUCOSE: 132 MG/DL (ref 74–99)
METER GLUCOSE: 138 MG/DL (ref 74–99)
METER GLUCOSE: 146 MG/DL (ref 74–99)
METER GLUCOSE: 149 MG/DL (ref 74–99)
METER GLUCOSE: 164 MG/DL (ref 74–99)
PDW BLD-RTO: 15.1 FL (ref 11.5–15)
PHOSPHORUS: 2.6 MG/DL (ref 2.5–4.5)
PHOSPHORUS: 2.8 MG/DL (ref 2.5–4.5)
PLATELET # BLD: 202 E9/L (ref 130–450)
PMV BLD AUTO: 11.2 FL (ref 7–12)
POTASSIUM SERPL-SCNC: 4.7 MMOL/L (ref 3.5–5)
POTASSIUM SERPL-SCNC: 4.8 MMOL/L (ref 3.5–5)
RBC # BLD: 4.75 E12/L (ref 3.5–5.5)
SODIUM BLD-SCNC: 139 MMOL/L (ref 132–146)
SODIUM BLD-SCNC: 144 MMOL/L (ref 132–146)
TOTAL PROTEIN: 6.5 G/DL (ref 6.4–8.3)
WBC # BLD: 11.2 E9/L (ref 4.5–11.5)

## 2021-08-25 PROCEDURE — 6360000002 HC RX W HCPCS: Performed by: NEUROLOGICAL SURGERY

## 2021-08-25 PROCEDURE — 2580000003 HC RX 258: Performed by: NURSE PRACTITIONER

## 2021-08-25 PROCEDURE — 80053 COMPREHEN METABOLIC PANEL: CPT

## 2021-08-25 PROCEDURE — 92610 EVALUATE SWALLOWING FUNCTION: CPT | Performed by: SPEECH-LANGUAGE PATHOLOGIST

## 2021-08-25 PROCEDURE — 6360000002 HC RX W HCPCS: Performed by: INTERNAL MEDICINE

## 2021-08-25 PROCEDURE — 82962 GLUCOSE BLOOD TEST: CPT

## 2021-08-25 PROCEDURE — 2700000000 HC OXYGEN THERAPY PER DAY

## 2021-08-25 PROCEDURE — APPSS15 APP SPLIT SHARED TIME 0-15 MINUTES: Performed by: NURSE PRACTITIONER

## 2021-08-25 PROCEDURE — 6360000002 HC RX W HCPCS: Performed by: NURSE PRACTITIONER

## 2021-08-25 PROCEDURE — 84100 ASSAY OF PHOSPHORUS: CPT

## 2021-08-25 PROCEDURE — 93010 ELECTROCARDIOGRAM REPORT: CPT | Performed by: INTERNAL MEDICINE

## 2021-08-25 PROCEDURE — 99291 CRITICAL CARE FIRST HOUR: CPT | Performed by: SURGERY

## 2021-08-25 PROCEDURE — 6370000000 HC RX 637 (ALT 250 FOR IP): Performed by: SURGERY

## 2021-08-25 PROCEDURE — 2500000003 HC RX 250 WO HCPCS: Performed by: SURGERY

## 2021-08-25 PROCEDURE — 2000000000 HC ICU R&B

## 2021-08-25 PROCEDURE — 83735 ASSAY OF MAGNESIUM: CPT

## 2021-08-25 PROCEDURE — 85027 COMPLETE CBC AUTOMATED: CPT

## 2021-08-25 PROCEDURE — 6370000000 HC RX 637 (ALT 250 FOR IP): Performed by: NURSE PRACTITIONER

## 2021-08-25 PROCEDURE — 2580000003 HC RX 258: Performed by: INTERNAL MEDICINE

## 2021-08-25 PROCEDURE — 80048 BASIC METABOLIC PNL TOTAL CA: CPT

## 2021-08-25 PROCEDURE — 6370000000 HC RX 637 (ALT 250 FOR IP): Performed by: INTERNAL MEDICINE

## 2021-08-25 PROCEDURE — C9113 INJ PANTOPRAZOLE SODIUM, VIA: HCPCS | Performed by: NURSE PRACTITIONER

## 2021-08-25 PROCEDURE — 2500000003 HC RX 250 WO HCPCS: Performed by: NURSE PRACTITIONER

## 2021-08-25 PROCEDURE — 36415 COLL VENOUS BLD VENIPUNCTURE: CPT

## 2021-08-25 PROCEDURE — 82330 ASSAY OF CALCIUM: CPT

## 2021-08-25 RX ORDER — DILTIAZEM HYDROCHLORIDE 5 MG/ML
10 INJECTION INTRAVENOUS ONCE
Status: COMPLETED | OUTPATIENT
Start: 2021-08-25 | End: 2021-08-25

## 2021-08-25 RX ORDER — METOPROLOL TARTRATE 5 MG/5ML
5 INJECTION INTRAVENOUS EVERY 6 HOURS
Status: DISCONTINUED | OUTPATIENT
Start: 2021-08-25 | End: 2021-08-26

## 2021-08-25 RX ADMIN — HYDROMORPHONE HYDROCHLORIDE 0.5 MG: 1 INJECTION, SOLUTION INTRAMUSCULAR; INTRAVENOUS; SUBCUTANEOUS at 10:13

## 2021-08-25 RX ADMIN — LITHIUM CARBONATE 300 MG: 300 CAPSULE ORAL at 16:20

## 2021-08-25 RX ADMIN — INSULIN LISPRO 2 UNITS: 100 INJECTION, SOLUTION INTRAVENOUS; SUBCUTANEOUS at 21:08

## 2021-08-25 RX ADMIN — HYDRALAZINE HYDROCHLORIDE 10 MG: 20 INJECTION INTRAMUSCULAR; INTRAVENOUS at 20:56

## 2021-08-25 RX ADMIN — PAROXETINE 40 MG: 20 TABLET, FILM COATED ORAL at 08:56

## 2021-08-25 RX ADMIN — LABETALOL HYDROCHLORIDE 10 MG: 5 INJECTION INTRAVENOUS at 02:04

## 2021-08-25 RX ADMIN — DEXAMETHASONE SODIUM PHOSPHATE 4 MG: 4 INJECTION, SOLUTION INTRAMUSCULAR; INTRAVENOUS at 20:56

## 2021-08-25 RX ADMIN — DEXAMETHASONE SODIUM PHOSPHATE 4 MG: 4 INJECTION, SOLUTION INTRAMUSCULAR; INTRAVENOUS at 15:32

## 2021-08-25 RX ADMIN — HYDRALAZINE HYDROCHLORIDE 10 MG: 20 INJECTION INTRAMUSCULAR; INTRAVENOUS at 01:10

## 2021-08-25 RX ADMIN — LEVETIRACETAM 750 MG: 1500 INJECTION, SOLUTION INTRAVENOUS at 15:32

## 2021-08-25 RX ADMIN — DEXAMETHASONE SODIUM PHOSPHATE 4 MG: 4 INJECTION, SOLUTION INTRAMUSCULAR; INTRAVENOUS at 02:04

## 2021-08-25 RX ADMIN — METOPROLOL TARTRATE 5 MG: 1 INJECTION, SOLUTION INTRAVENOUS at 17:39

## 2021-08-25 RX ADMIN — LITHIUM CARBONATE 300 MG: 300 CAPSULE ORAL at 12:35

## 2021-08-25 RX ADMIN — AMLODIPINE BESYLATE 5 MG: 5 TABLET ORAL at 08:56

## 2021-08-25 RX ADMIN — Medication 10 ML: at 08:56

## 2021-08-25 RX ADMIN — INSULIN LISPRO 3 UNITS: 100 INJECTION, SOLUTION INTRAVENOUS; SUBCUTANEOUS at 05:44

## 2021-08-25 RX ADMIN — SODIUM CHLORIDE: 9 INJECTION, SOLUTION INTRAVENOUS at 12:35

## 2021-08-25 RX ADMIN — LITHIUM CARBONATE 300 MG: 300 CAPSULE ORAL at 20:56

## 2021-08-25 RX ADMIN — INSULIN LISPRO 3 UNITS: 100 INJECTION, SOLUTION INTRAVENOUS; SUBCUTANEOUS at 10:13

## 2021-08-25 RX ADMIN — METOPROLOL TARTRATE 5 MG: 1 INJECTION, SOLUTION INTRAVENOUS at 06:41

## 2021-08-25 RX ADMIN — LEVETIRACETAM 750 MG: 1500 INJECTION, SOLUTION INTRAVENOUS at 04:09

## 2021-08-25 RX ADMIN — DILTIAZEM HYDROCHLORIDE 10 MG: 5 INJECTION INTRAVENOUS at 00:25

## 2021-08-25 RX ADMIN — LABETALOL HYDROCHLORIDE 10 MG: 5 INJECTION INTRAVENOUS at 02:35

## 2021-08-25 RX ADMIN — LABETALOL HYDROCHLORIDE 10 MG: 5 INJECTION INTRAVENOUS at 05:37

## 2021-08-25 RX ADMIN — HYDROMORPHONE HYDROCHLORIDE 0.5 MG: 1 INJECTION, SOLUTION INTRAMUSCULAR; INTRAVENOUS; SUBCUTANEOUS at 02:10

## 2021-08-25 RX ADMIN — Medication 10 ML: at 21:03

## 2021-08-25 RX ADMIN — SODIUM CHLORIDE, PRESERVATIVE FREE 10 ML: 5 INJECTION INTRAVENOUS at 08:56

## 2021-08-25 RX ADMIN — VANCOMYCIN HYDROCHLORIDE 1750 MG: 10 INJECTION, POWDER, LYOPHILIZED, FOR SOLUTION INTRAVENOUS at 22:00

## 2021-08-25 RX ADMIN — METOPROLOL TARTRATE 5 MG: 1 INJECTION, SOLUTION INTRAVENOUS at 12:35

## 2021-08-25 RX ADMIN — HYDROMORPHONE HYDROCHLORIDE 0.5 MG: 1 INJECTION, SOLUTION INTRAMUSCULAR; INTRAVENOUS; SUBCUTANEOUS at 17:39

## 2021-08-25 RX ADMIN — PANTOPRAZOLE SODIUM 40 MG: 40 INJECTION, POWDER, FOR SOLUTION INTRAVENOUS at 08:56

## 2021-08-25 RX ADMIN — VANCOMYCIN HYDROCHLORIDE 1750 MG: 10 INJECTION, POWDER, LYOPHILIZED, FOR SOLUTION INTRAVENOUS at 10:13

## 2021-08-25 RX ADMIN — DEXAMETHASONE SODIUM PHOSPHATE 4 MG: 4 INJECTION, SOLUTION INTRAMUSCULAR; INTRAVENOUS at 08:55

## 2021-08-25 RX ADMIN — LITHIUM CARBONATE 300 MG: 300 CAPSULE ORAL at 08:56

## 2021-08-25 ASSESSMENT — PAIN DESCRIPTION - LOCATION
LOCATION: HEAD
LOCATION: HEAD

## 2021-08-25 ASSESSMENT — PAIN SCALES - GENERAL
PAINLEVEL_OUTOF10: 0
PAINLEVEL_OUTOF10: 0
PAINLEVEL_OUTOF10: 4
PAINLEVEL_OUTOF10: 0
PAINLEVEL_OUTOF10: 4
PAINLEVEL_OUTOF10: 0
PAINLEVEL_OUTOF10: 4

## 2021-08-25 ASSESSMENT — PAIN DESCRIPTION - ONSET
ONSET: ON-GOING
ONSET: ON-GOING

## 2021-08-25 ASSESSMENT — PAIN DESCRIPTION - PAIN TYPE
TYPE: ACUTE PAIN;SURGICAL PAIN
TYPE: ACUTE PAIN;SURGICAL PAIN

## 2021-08-25 ASSESSMENT — PAIN DESCRIPTION - PROGRESSION
CLINICAL_PROGRESSION: GRADUALLY IMPROVING
CLINICAL_PROGRESSION: GRADUALLY IMPROVING

## 2021-08-25 ASSESSMENT — PAIN DESCRIPTION - FREQUENCY
FREQUENCY: INTERMITTENT
FREQUENCY: INTERMITTENT

## 2021-08-25 ASSESSMENT — PAIN DESCRIPTION - DESCRIPTORS
DESCRIPTORS: HEADACHE
DESCRIPTORS: HEADACHE

## 2021-08-25 NOTE — PROGRESS NOTES
Neurosurg progress note  VITALS:  /62   Pulse 69   Temp 97.5 °F (36.4 °C) (Oral)   Resp 15   Ht 5' 4\" (1.626 m)   Wt (!) 332 lb (150.6 kg) Comment: per 8/20 documentation  SpO2 99%   BMI 56.99 kg/m²   24HR INTAKE/OUTPUT:    Intake/Output Summary (Last 24 hours) at 8/25/2021 1509  Last data filed at 8/25/2021 1500  Gross per 24 hour   Intake 2295 ml   Output 1565 ml   Net 730 ml     CT HEAD W CONTRAST    Result Date: 8/22/2021  EXAMINATION: CT OF THE HEAD WITH CONTRAST  8/22/2021 10:20 am TECHNIQUE: CT of the head/brain was performed with the administration of intravenous contrast. Multiplanar reformatted images are provided for review. Dose modulation, iterative reconstruction, and/or weight based adjustment of the mA/kV was utilized to reduce the radiation dose to as low as reasonably achievable. COMPARISON: CT head 08/20/2021 HISTORY: ORDERING SYSTEM PROVIDED HISTORY: Brain tumor TECHNOLOGIST PROVIDED HISTORY: Need frameless stereotactic protocol (bravo) for surgery mon am Reason for exam:->Brain tumor What reading provider will be dictating this exam?->CRC FINDINGS: BRAIN/VENTRICLES: No intracranial hemorrhage or hydrocephalus is identified. There is a large predominantly cystic mass within the left temporal lobe containing a peripheral enhancing component located at the lateral aspect. The mass measures 6 cm x 4.6 cm and most likely represents a primary brain glioma. Moderate mass effect upon the left lateral ventricle is identified. Mild midline shift from right to left is appreciated that is not significantly changed. No other enhancing lesions are identified within the brain. ORBITS: The visualized portion of the orbits demonstrate no acute abnormality. SINUSES: The visualized paranasal sinuses and mastoid air cells demonstrate no acute abnormality. SOFT TISSUES/SKULL:  No acute abnormality of the visualized skull or soft tissues.      Redemonstration of a large 6 cm x 4.6 cm predominantly cystic mass with an enhancing peripheral component. A primary brain glioma is suspected. Moderate mass effect upon the left lateral ventricle and mild midline shift from left to right is stable. CT CHEST W CONTRAST    Result Date: 8/22/2021  EXAMINATION: CT OF THE CHEST WITH CONTRAST; CT OF THE ABDOMEN AND PELVIS WITH CONTRAST; CT OF THE CERVICAL SPINE WITHOUT CONTRAST 8/22/2021 10:20 am TECHNIQUE: CT of the chest was performed with the administration of intravenous contrast. Multiplanar reformatted images are provided for review. Dose modulation, iterative reconstruction, and/or weight based adjustment of the mA/kV was utilized to reduce the radiation dose to as low as reasonably achievable.; CT of the abdomen and pelvis was performed with the administration of intravenous contrast. Multiplanar reformatted images are provided for review. Dose modulation, iterative reconstruction, and/or weight based adjustment of the mA/kV was utilized to reduce the radiation dose to as low as reasonably achievable.; CT of the cervical spine was performed without the administration of intravenous contrast. Multiplanar reformatted images are provided for review. Dose modulation, iterative reconstruction, and/or weight based adjustment of the mA/kV was utilized to reduce the radiation dose to as low as reasonably achievable. COMPARISON: None. HISTORY: ORDERING SYSTEM PROVIDED HISTORY: Brain tumor TECHNOLOGIST PROVIDED HISTORY: Reason for exam:->Brain tumor What reading provider will be dictating this exam?->CRC FINDINGS: CT cervical spine. There is no acute fracture or dislocation in the cervical spine. There is diffuse advanced degenerative changes from C2-T1 with osteophytes and multilevel disc bulges. Large anterior osteophytes are also noted.   The prevertebral soft tissues are normal.  A well corticated bony fragment is identified at the tip of the dense probably a previous ununited ossifications center versus old avulsion THE ABDOMEN AND PELVIS WITH CONTRAST; CT OF THE CERVICAL SPINE WITHOUT CONTRAST 8/22/2021 10:20 am TECHNIQUE: CT of the chest was performed with the administration of intravenous contrast. Multiplanar reformatted images are provided for review. Dose modulation, iterative reconstruction, and/or weight based adjustment of the mA/kV was utilized to reduce the radiation dose to as low as reasonably achievable.; CT of the abdomen and pelvis was performed with the administration of intravenous contrast. Multiplanar reformatted images are provided for review. Dose modulation, iterative reconstruction, and/or weight based adjustment of the mA/kV was utilized to reduce the radiation dose to as low as reasonably achievable.; CT of the cervical spine was performed without the administration of intravenous contrast. Multiplanar reformatted images are provided for review. Dose modulation, iterative reconstruction, and/or weight based adjustment of the mA/kV was utilized to reduce the radiation dose to as low as reasonably achievable. COMPARISON: None. HISTORY: ORDERING SYSTEM PROVIDED HISTORY: Brain tumor TECHNOLOGIST PROVIDED HISTORY: Reason for exam:->Brain tumor What reading provider will be dictating this exam?->CRC FINDINGS: CT cervical spine. There is no acute fracture or dislocation in the cervical spine. There is diffuse advanced degenerative changes from C2-T1 with osteophytes and multilevel disc bulges. Large anterior osteophytes are also noted. The prevertebral soft tissues are normal.  A well corticated bony fragment is identified at the tip of the dense probably a previous ununited ossifications center versus old avulsion injury. Note is made of a large cystic mass in the left temporal fossa measuring 6 x 4 cm likely necrotic malignancy. Brain abscess can have a similar appearance. No acute displaced fracture. Advanced diffuse degenerative changes with multilevel disc bulges from C2-T1.  A large necrotic mass in the left temporal lobe. CT chest. There is cardiomegaly. The great vessels are normal.  Trachea and major bronchi are patent. There is atelectasis in the lung bases. There is mosaic pattern of ground-glass opacities which may be due to air trapping due to small airway disease. Edema or pneumonia are less likely. Degenerative changes are identified in the thoracic spine with bridging osteophytes. 1 1.1 cm right thyroid nodule is present. Consider ultrasonographic surveillance. Impression Cardiomegaly with mild diffuse ground-glass opacities and atelectasis likely due to air trapping with small airway disease. Pneumonia or edema are less likely. CT abdomen pelvis. There is hepatomegaly with liver measuring 18 cm with diffuse fatty infiltration. There is a 3.4 x 3.4 cm infiltrative hypodense lesion in the medial left hepatic lobe which is indeterminate for malignancy. Gallbladder is distended. Spleen appears normal.  1.1 cm low-attenuation lesion is identified in the body of pancreas of unknown etiology. Adrenals and the kidneys are normal.  Degenerative changes are noted in the lumbar spine. Fat containing umbilical hernia is noted. Pelvis. Bladder is contracted with a Arciniega catheter. Colon is collapsed. The appendix is normal. Impression Fatty liver with a 3.4 x 3.4 cm ill-defined indeterminate hypodense lesion in the medial left hepatic lobe. Developing malignancy is a consideration. Consider PET-CT scan. 1.1 cm indeterminate hypodense lesion in the body of pancreas. Surveillance is recommended.      CT ABDOMEN PELVIS W IV CONTRAST Additional Contrast? Radiologist Recommendation    Result Date: 8/22/2021  EXAMINATION: CT OF THE CHEST WITH CONTRAST; CT OF THE ABDOMEN AND PELVIS WITH CONTRAST; CT OF THE CERVICAL SPINE WITHOUT CONTRAST 8/22/2021 10:20 am TECHNIQUE: CT of the chest was performed with the administration of intravenous contrast. Multiplanar reformatted images are provided for review. Dose modulation, iterative reconstruction, and/or weight based adjustment of the mA/kV was utilized to reduce the radiation dose to as low as reasonably achievable.; CT of the abdomen and pelvis was performed with the administration of intravenous contrast. Multiplanar reformatted images are provided for review. Dose modulation, iterative reconstruction, and/or weight based adjustment of the mA/kV was utilized to reduce the radiation dose to as low as reasonably achievable.; CT of the cervical spine was performed without the administration of intravenous contrast. Multiplanar reformatted images are provided for review. Dose modulation, iterative reconstruction, and/or weight based adjustment of the mA/kV was utilized to reduce the radiation dose to as low as reasonably achievable. COMPARISON: None. HISTORY: ORDERING SYSTEM PROVIDED HISTORY: Brain tumor TECHNOLOGIST PROVIDED HISTORY: Reason for exam:->Brain tumor What reading provider will be dictating this exam?->CRC FINDINGS: CT cervical spine. There is no acute fracture or dislocation in the cervical spine. There is diffuse advanced degenerative changes from C2-T1 with osteophytes and multilevel disc bulges. Large anterior osteophytes are also noted. The prevertebral soft tissues are normal.  A well corticated bony fragment is identified at the tip of the dense probably a previous ununited ossifications center versus old avulsion injury. Note is made of a large cystic mass in the left temporal fossa measuring 6 x 4 cm likely necrotic malignancy. Brain abscess can have a similar appearance. No acute displaced fracture. Advanced diffuse degenerative changes with multilevel disc bulges from C2-T1. A large necrotic mass in the left temporal lobe. CT chest. There is cardiomegaly. The great vessels are normal.  Trachea and major bronchi are patent. There is atelectasis in the lung bases.   There is mosaic pattern of ground-glass opacities which may be due to air trapping due to small airway disease. Edema or pneumonia are less likely. Degenerative changes are identified in the thoracic spine with bridging osteophytes. 1 1.1 cm right thyroid nodule is present. Consider ultrasonographic surveillance. Impression Cardiomegaly with mild diffuse ground-glass opacities and atelectasis likely due to air trapping with small airway disease. Pneumonia or edema are less likely. CT abdomen pelvis. There is hepatomegaly with liver measuring 18 cm with diffuse fatty infiltration. There is a 3.4 x 3.4 cm infiltrative hypodense lesion in the medial left hepatic lobe which is indeterminate for malignancy. Gallbladder is distended. Spleen appears normal.  1.1 cm low-attenuation lesion is identified in the body of pancreas of unknown etiology. Adrenals and the kidneys are normal.  Degenerative changes are noted in the lumbar spine. Fat containing umbilical hernia is noted. Pelvis. Bladder is contracted with a Arciniega catheter. Colon is collapsed. The appendix is normal. Impression Fatty liver with a 3.4 x 3.4 cm ill-defined indeterminate hypodense lesion in the medial left hepatic lobe. Developing malignancy is a consideration. Consider PET-CT scan. 1.1 cm indeterminate hypodense lesion in the body of pancreas. Surveillance is recommended.      CBC:   Lab Results   Component Value Date    WBC 11.2 08/25/2021    RBC 4.75 08/25/2021    RBC 4.68 04/26/2017    HGB 12.6 08/25/2021    HCT 41.2 08/25/2021    MCV 86.7 08/25/2021    MCH 26.5 08/25/2021    MCHC 30.6 08/25/2021    RDW 15.1 08/25/2021     08/25/2021    MPV 11.2 08/25/2021     BMP:    Lab Results   Component Value Date     08/25/2021    K 4.8 08/25/2021    K 4.7 08/21/2021     08/25/2021    CO2 20 08/25/2021    BUN 23 08/25/2021    LABALBU 3.7 08/25/2021    CREATININE 0.6 08/25/2021    CALCIUM 9.2 08/25/2021    GFRAA >60 08/25/2021    LABGLOM >60 08/25/2021    GLUCOSE 179 08/25/2021      metoprolol 5 mg IntraVENous Q6H    insulin lispro  0-18 Units Subcutaneous TID WC    insulin lispro  0-9 Units Subcutaneous Nightly    pantoprazole  40 mg IntraVENous Daily    And    sodium chloride (PF)  10 mL IntraVENous Daily    vancomycin  1,750 mg IntraVENous Q12H    scopolamine  1 patch Transdermal Q72H    sodium chloride flush  10 mL IntraVENous Once    sodium chloride flush  5-40 mL IntraVENous 2 times per day    amLODIPine  5 mg Oral Daily    lithium  300 mg Oral 4x Daily    PARoxetine  40 mg Oral QAM    dexamethasone  4 mg IntraVENous Q6H    levetiracetam  750 mg IntraVENous Q12H     Sitting upright in bed follows commands answeres simple questions ICP's controled  Assessment:  Patient Active Problem List   Diagnosis    Morbid obesity ([address]    Brain mass    Brain bleed ([facility] [address]    Essential hypertension    Depression    Leukocytosis    AMS (altered mental status)    Morbid obesity with BMI of 50.0-59.9, adult (HCC)    Hyponatremia    Paroxysmal atrial fibrillation (HCC)    Liver lesion - INCIDENTAL     Plan:Continue current care  Rivas Marcus MD M.D.

## 2021-08-25 NOTE — PROGRESS NOTES
right than left. She moves left upper extremity with much encouragement. Bilateral toe wiggle. Pupil size:  Left 3 mm  Right 3 mm  Pupil reaction: Yes   PERRLA  Wiggles fingers: Left   Yes  Right Yes  Hand grasp:   Left: Yes     Right    Yes  Wiggles toes: Left   Yes Right  Yes  Plantar flexion: Left  Yes  Right   Yes  Facial droop:   none   Speech:  clear    Crani incision:  CDI. ICP:  10-20 mm Hg. CPP:  59-70 mm Hg. CONSTITUTIONAL: No acute distress, lying in hospital bed. CARDIOVASCULAR: S1 S2, regular rate, regular rhythm, no murmur/gallop/rub. Monitor: NSR. PULMONARY: Bilaterally clear. No rhonchi/rales/wheezes, no use of accessory muscles. RENAL:  Arciniega to gravity, clear yellow urine. Fluid balance for previous 24 hours: + 945 ml. ABDOMEN: Soft, nontender, nondistended, nontympanic, normal bowel sounds. Drinknt contrast for CT scan. No reported nausea or vomiting. MUSCULOSKELETAL:  Moves all extremities to command weakly. SKIN/EXTREMITIES: No rashes/ecchymosis, no edema/clubbing, warm/dry, good capillary refill. LINES:   Peripheral     Arterial line. Right radial.  Day 3.   Good waveform/     Recent Labs     08/23/21  0425 08/24/21  0430 08/25/21  0510   WBC 11.3 15.8* 11.2   HGB 13.8 13.5 12.6   HCT 44.1 44.0 41.2   MCV 85.6 84.9 86.7    275 202       Recent Labs     08/24/21  0430 08/25/21  0100 08/25/21  0510    139 144   K 4.6 4.7 4.8   CO2 24 27 20*   PHOS 3.4 2.8 2.6   BUN 28* 21* 23*   CREATININE 0.7 0.6 0.6       Recent Labs     08/23/21  0425 08/24/21  0430 08/25/21  0510   PROT 7.0 6.9 6.5     ASSESSMENT/PLAN:     Principal Problem:    Brain mass  Active Problems:    Brain bleed (HCC)    Essential hypertension    Depression    Leukocytosis    AMS (altered mental status)    Morbid obesity with BMI of 50.0-59.9, adult (HCC)    Hyponatremia    Paroxysmal atrial fibrillation (HCC)    Liver lesion - INCIDENTAL  Resolved Problems:    * No resolved hospital problems. 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 S/P Craniotomy for brain biopsy. Hx of bipolar disorder & depression. Monitor neuro status. Neurosurgery following. Keppra for seizure prophylaxis. Lithium. Paxil. Decadron. CV:  No acute issues. Hx of HTN    Monitor hemodynamics. BP goal systolic less than 413 mm Hg. PRN hydralzine & labetalol. Norvasc. Metoprolol. Pulm: No acute issues. Monitor RR & SpO2. O2 as needed. PRN  Albuterol. Encourage cough, SMI  & deep breathing. GI: No acute issues. Morbid obesity. BMI 56. .    Monitor bowel function. NPO. Tigan. Scopolamine patch. Speech therapy to see for swallow evaluation. Renal:  No acute issues. Hx of stress incontinence. Monitor BUN & Cr, electrolytes & replace as needed. Monitor I & O. Arciniega. ID: Leukocytosis. Vancomycin. Day 4. Endocrine: Hyperglycemia. Hx of diabetes & hypothyroid nodules. Monitor BS.    ISS.       MSK:  Deconditioned.   ROM. Turn & reposition. PT & OT pending recommendations. Monitor for skin breakdown. Heme: No acute issues. Hx of anemia   Monitor CBC. Bowel regime: Bisacodyl   Pain control/Sedation:  Morphine,. Benadryl. DVT prophylaxis: SCD   GI prophylaxis: Protonix  Glucose protocol:  ISS  Arciniega: Keep in place for critical care monitoring of fluid balance. Ancillary consults:  Medicine. Neurosurgery. Palliative care. Critical care. Patient/Family update: Will update when family here. Code status:   Full code. Disposition: Continue ICU.       Electronically signed by Delaney Thompson RN MSN APRN-NP [facility] NP  [facility]NS CCRN 8/25/2021 11:08 AM

## 2021-08-25 NOTE — PROGRESS NOTES
Chief Complaint:  Brain mass     Subjective:    Remains aphasic but able to string together a few understandable simple sentences today. R arm variably weak. Objective:    /65   Pulse 70   Temp 98.3 °F (36.8 °C) (Oral)   Resp 13   Ht 5' 4\" (1.626 m)   Wt (!) 332 lb (150.6 kg) Comment: per 8/20 documentation  SpO2 99%   BMI 56.99 kg/m²     Current medications that patient is taking have been reviewed. General appearance: Nontoxic, morbidly obese  HEENT: dressing on head, MMM  Neck: FROM, supple  Lungs: Clear to auscultation anteriorly, WOB normal  CV: RRR, no MRGs  Abdomen: Soft, non-tender; no masses or HSM, +BS  Extremities: No peripheral edema or digital cyanosis  Skin: no rash, lesions or ulcers  Psych: Calm and cooperative  Neuro: Alert and interactive, face symmetric, very aphasic, R arm weak, moving other extremities though difficult to gauge true strength due to weak effort.     Labs:  CBC with Differential:    Lab Results   Component Value Date    WBC 11.2 08/25/2021    RBC 4.75 08/25/2021    RBC 4.68 04/26/2017    HGB 12.6 08/25/2021    HCT 41.2 08/25/2021     08/25/2021    MCV 86.7 08/25/2021    MCH 26.5 08/25/2021    MCHC 30.6 08/25/2021    RDW 15.1 08/25/2021    LYMPHOPCT 7.8 08/20/2021    MONOPCT 1.9 08/20/2021    BASOPCT 0.2 08/20/2021    MONOSABS 0.26 08/20/2021    LYMPHSABS 1.06 08/20/2021    EOSABS 0.00 08/20/2021    BASOSABS 0.03 08/20/2021     CMP:    Lab Results   Component Value Date     08/25/2021    K 4.8 08/25/2021    K 4.7 08/21/2021     08/25/2021    CO2 20 08/25/2021    BUN 23 08/25/2021    CREATININE 0.6 08/25/2021    GFRAA >60 08/25/2021    LABGLOM >60 08/25/2021    GLUCOSE 179 08/25/2021    PROT 6.5 08/25/2021    LABALBU 3.7 08/25/2021    CALCIUM 9.2 08/25/2021    BILITOT 0.4 08/25/2021    ALKPHOS 25 08/25/2021    AST 23 08/25/2021    ALT 24 08/25/2021          Telemetry:  I've personally reviewed the patient's telemetry:  No further PAF today    Assessment/Plan:  Principal Problem:    Brain mass  Active Problems:    Brain bleed (HCC)    Essential hypertension    Depression    Leukocytosis    AMS (altered mental status)    Morbid obesity with BMI of 50.0-59.9, adult (HCC)    Hyponatremia    Paroxysmal atrial fibrillation (HCC)    Liver lesion - INCIDENTAL  Resolved Problems:    * No resolved hospital problems. *       Await final path. Continue post op care per Neurosurgery    New onset PAF. TTE pending. Continue metoprolol. Obviously no anticoagulation.   Will need outpatient rhythm monitoring    BP well controlled    Requires continued inpatient level of care   Bernardo Barker MD    5:52 PM   8/25/2021

## 2021-08-25 NOTE — PLAN OF CARE
Problem: Discharge Planning:  Goal: Discharged to appropriate level of care  Description: Discharged to appropriate level of care  Outcome: Not Met This Shift     Problem: Verbal Communication - Impaired:  Goal: Functional communication will improve  Description: Functional communication will improve  Outcome: Not Met This Shift  Goal: Ability to interact with others will improve  Description: Ability to interact with others will improve  Outcome: Met This Shift  Goal: Ability to establish a method of communication will improve  Description: Ability to establish a method of communication will improve  Outcome: Met This Shift     Problem: Pain:  Goal: Pain level will decrease  Description: Pain level will decrease  Outcome: Met This Shift  Goal: Recognizes and communicates pain  Description: Recognizes and communicates pain  Outcome: Met This Shift     Problem: Mobility - Impaired:  Goal: Able to ambulate independently  Description: Able to ambulate independently  Outcome: Not Met This Shift  Goal: Able to ambulate with minimal assistance  Description: Able to ambulate with minimal assistance  Outcome: Not Met This Shift  Goal: Ability to appropriately use an adaptive device for ambulation will improve  Description: Ability to appropriately use an adaptive device for ambulation will improve  Outcome: Not Met This Shift  Goal: Able to verbalize acceptance of life and situations over which he or she has no control  Description: Absence of contracture deformity  Outcome: Not Met This Shift     Problem: Autonomic Dysreflexia - Risk of:  Goal: Absence of autonomic dysreflexia signs and symptoms  Description: Absence of autonomic dysreflexia signs and symptoms  Outcome: Met This Shift     Problem: Injury - Risk of, Healthcare-Acquired Condition:  Goal: Absence of healthcare acquired conditions  Description: Absence of healthcare acquired conditions  Outcome: Met This Shift  Goal: Will remain free from falls  Description: Will remain free from falls  Outcome: Met This Shift  Goal: Absence of pressure ulcer  Description: Absence of pressure ulcer  Outcome: Met This Shift  Goal: Demonstration of wound healing without infection will improve  Description: Demonstration of wound healing without infection will improve  Outcome: Met This Shift  Goal: Absence of urinary tract infection signs and symptoms  Description: Absence of urinary tract infection signs and symptoms  Outcome: Met This Shift     Problem: Nutrition Deficit - Risk of:  Goal: Ability to achieve adequate nutritional intake will improve  Description: Ability to achieve adequate nutritional intake will improve  Outcome: Met This Shift  Goal: Maintenance of adequate weight for body size and type will improve to within specified parameters  Description: Maintenance of adequate weight for body size and type will improve to within specified parameters  Outcome: Not Met This Shift     Problem: Swallowing - Impaired:  Goal: Able to swallow without choking  Description: Able to swallow without choking  Outcome: Met This Shift  Goal: Absence of aspiration  Description: Absence of aspiration  Outcome: Met This Shift     Problem: Aspiration - Risk of:  Goal: Absence of aspiration  Description: Absence of aspiration  Outcome: Met This Shift     Problem:  Bowel Function - Altered:  Goal: Bowel elimination is within specified parameters  Description: Bowel elimination is within specified parameters  Outcome: Met This Shift  Goal: Control of bowel function will improve  Description: Control of bowel function will improve  Outcome: Met This Shift  Goal: Ability to maintain normal bowel function will improve  Description: Ability to maintain normal bowel function will improve  Outcome: Met This Shift     Problem: Falls - Risk of:  Goal: Will remain free from falls  Description: Will remain free from falls  Outcome: Met This Shift     Problem: Pain:  Goal: Pain level will decrease  Description:

## 2021-08-25 NOTE — PLAN OF CARE
Problem: Pain:  Goal: Pain level will decrease  Description: Pain level will decrease  8/25/2021 0133 by Shen Cody RN  Outcome: Met This Shift     Problem: Injury - Risk of, Healthcare-Acquired Condition:  Goal: Will remain free from falls  Description: Will remain free from falls  8/25/2021 0133 by Shen Cody RN  Outcome: Met This Shift     Problem: Swallowing - Impaired:  Goal: Absence of aspiration  Description: Absence of aspiration  8/25/2021 0133 by Shen Cody RN  Outcome: Met This Shift     Problem: Falls - Risk of:  Goal: Will remain free from falls  Description: Will remain free from falls  8/25/2021 0133 by Shen Cody RN  Outcome: Met This Shift     Problem: Verbal Communication - Impaired:  Goal: Functional communication will improve  Description: Functional communication will improve  8/25/2021 0133 by Shen Cody RN  Outcome: Ongoing     Problem: Verbal Communication - Impaired:  Goal: Ability to establish a method of communication will improve  Description: Ability to establish a method of communication will improve  8/25/2021 0133 by Shen Cody RN  Outcome: Ongoing

## 2021-08-25 NOTE — PROGRESS NOTES
LSW met at bedside with pts  to offer ongoing support.   Pt is awake in icu,  denies needs at this time

## 2021-08-25 NOTE — PROGRESS NOTES
Pharmacy Consultation Note  (Antibiotic Dosing and Monitoring)    Initial consult date: 8/23/21  Consulting provider: Kenia MACKEY  Drug: Vancomycin  Indication: Surgical Prophylaxis - ICP monitor in place    Age/  Gender Height Weight IBW  Allergy Information   56 y.o./female 5' 4\" (162.6 cm) (per 8/20 docuementation)162.6 cm (!) 332 lb (150.6 kg) (per 8/20 documentation)  150.6 kg   Ideal body weight: 54.7 kg (120 lb 9.5 oz)  Adjusted ideal body weight: 93.1 kg (205 lb 2.5 oz)   Clindamycin/lincomycin, Iodine, Penicillins, and Sulfa antibiotics      Renal Function:  Recent Labs     08/23/21  0425 08/23/21  1156 08/24/21  0430 08/25/21  0100   BUN 20  --  28* 21*   CREATININE 0.7 0.7 0.7 0.6       Intake/Output Summary (Last 24 hours) at 8/25/2021 0723  Last data filed at 8/25/2021 0700  Gross per 24 hour   Intake 1815 ml   Output 2025 ml   Net -210 ml       Vancomycin Monitoring:  Trough:    Recent Labs     08/24/21  2140   [address] 13.3     Random:  No results for input(s): VANCORANDOM in the last 72 hours. Vancomycin Administration Times:  Recent vancomycin administrations                   vancomycin (VANCOCIN) 1,750 mg in sodium chloride 0.9 % 500 mL IVPB (mg) 1,750 mg New Bag 08/24/21 2244     1,750 mg New Bag  1100     1,750 mg New Bag 08/23/21 2313    vancomycin (VANCOCIN) injection (mg) 500 mg Given 08/23/21 1154    vancomycin (VANCOCIN) 2,000 mg in dextrose 5 % 500 mL IVPB (mg) 2,000 mg New Bag 08/23/21 1020                  Assessment:  · Patient is a 60 y.o. female who has been initiated on vancomycin  Estimated Creatinine Clearance: 150 mL/min (based on SCr of 0.6 mg/dL). · Vancomycin trough 13.3 mcg/mL last night; Goal trough = 10 - 20 mcg/ml;  Goal AUC/EZRA 400-600  · Received vancomycin 2000 mg IV x 1 pre-operatively yesterday    Plan:  · Continue Vancomycin 1750mg IV every 12 hours  · Will continue to check vancomycin trough levels and will adjust as needed  · Will continue to monitor renal function   · Clinical pharmacy to follow    Avis Desouza PharmD, BCPS, [facility]  8/25/2021  7:27 AM

## 2021-08-25 NOTE — FLOWSHEET NOTE
Patient went into sinus tach, critical care resident notified and gave orders. Patient did not break the rate in the 130's with the multiple doses of lopressor. Resident notified again and given new orders, patient broke into NS approximately 0030 on the 25th. Around 0045 the patient had a run of accelerated junctional rhythm and converted on her own. Checking lab work for possible cause.

## 2021-08-25 NOTE — PROGRESS NOTES
Comprehensive Nutrition Assessment    Type and Reason for Visit:  Initial    Nutrition Recommendations/Plan: ADAT    Nutrition Assessment:  Pt adm w/ brain mass now s/p crani w/ bx. Noted N/V PTA. Hx DM. Will monitor for nutrition progression    Malnutrition Assessment:  Malnutrition Status: At risk for malnutrition (Comment)    Context:  Acute Illness     Findings of the 6 clinical characteristics of malnutrition:  Energy Intake:  Mild decrease in energy intake (Comment)  Weight Loss:  Unable to assess (no  wt hx per EMR)     Body Fat Loss:  No significant body fat loss     Muscle Mass Loss:  No significant muscle mass loss    Fluid Accumulation:  No significant fluid accumulation     Strength:  Not Performed    Estimated Daily Nutrient Needs:  Energy (kcal):   (MSJ 2071 x 1.2 SF); Weight Used for Energy Requirements:  Current     Protein (g):  125-140 (2.2-2.5);  Weight Used for Protein Requirements:  Ideal        Fluid (ml/day):  per neuro management; Method Used for Fluid Requirements:  1 ml/kcal      Nutrition Related Findings:  expressive aphasia, soft abd, active BS, N/V PTA, generalized +1 edema, fluids WNL      Wounds:  Surgical Incision       Current Nutrition Therapies:    Diet NPO Exceptions are: Sips of Water with Meds    Anthropometric Measures:  · Height: 5' 4\" (162.6 cm)  · Current Body Weight: 332 lb (150.6 kg) (8/24, UTO updated CBW)   · Usual Body Weight:  (UTO no wt hx per EMR)     · Ideal Body Weight: 120 lbs; % Ideal Body Weight 276.7 %   · BMI: 57  · BMI Categories: Obese Class 3 (BMI 40.0 or greater)       Nutrition Diagnosis:   · Inadequate oral intake related to cognitive or neurological impairment (s/p crani) as evidenced by NPO or clear liquid status due to medical condition      Nutrition Interventions:   Food and/or Nutrient Delivery:  Continue NPO (ADAT)  Nutrition Education/Counseling:  Education not indicated   Coordination of Nutrition Care:  Continue to monitor while inpatient    Goals:  Nutrition progression       Nutrition Monitoring and Evaluation:   Food/Nutrient Intake Outcomes:  Diet Advancement/Tolerance  Physical Signs/Symptoms Outcomes:  Biochemical Data, Chewing or Swallowing, GI Status, Fluid Status or Edema, Nausea or Vomiting, Nutrition Focused Physical Findings, Skin, Weight     Discharge Planning:     Too soon to determine     Electronically signed by Gretel Arroyo MS, RD, LD on 8/25/21 at 2:09 PM EDT    Contact: [phone]

## 2021-08-25 NOTE — PROGRESS NOTES
SPEECH/LANGUAGE PATHOLOGY  CLINICAL ASSESSMENT OF SWALLOWING FUNCTION   and PLAN OF CARE    PATIENT NAME:  Luiza Zapata  (female)     MRN:  [id number]    :  1965  (60 y.o.)  STATUS:  Inpatient: Room 3818/3818-A    TODAY'S DATE:  21 1100   SLP eval and treat Start: 21 1100, End: 21 1100, ONE TIME, Standing Count: 1 Occurrences, R    Maylin Paz, APRN - CNS  REASON FOR REFERRAL: s/p crani   EVALUATING THERAPIST: HILTON Ye                 RESULTS:    DYSPHAGIA DIAGNOSIS:   Clinical indicators of mild  oropharyngeal phase dysphagia       DIET RECOMMENDATIONS:  Pureed consistency solids (dysphagia 1) with  nectar consistency (mildly thick) liquids     FEEDING RECOMMENDATIONS:     Assistance level:  Full assistance is needed during all oral intake, Supervision is needed during all oral intake      Compensatory strategies recommended: Small bites/sips, Alternate solids and liquids and Check for oral pocketing      Discussed recommendations with nursing and/or faxed report to referring provider: Yes    SPEECH THERAPY  PLAN OF CARE   The dysphagia POC is established based on physician order, dysphagia diagnosis and results of clinical assessment     Skilled SLP intervention for dysphagia management on acute care 3-5 x per week until goals met, pt plateaus in function and/or discharged from hospital    Conditions Requiring Skilled Therapeutic Intervention for dysphagia:    Patient is performing below functional baseline d/t current acute condition, Multiple diagnoses, multiple medications, and increased dependency upon caregivers. Specific dysphagia interventions to include:      Therapeutic exercises  Trials of upgraded diet/liquid     Specific instructions for next treatment:  ongoing PO analysis to upgrade diet and evaluate tolerance of current PO recommendation and initiate instruction of therapeutic exercises   Patient Treatment Goals:    Short Term Goals:  Pt will participate in ongoing mealtime assessment to provide diet modification and compensatory strategy implementation to minimize risk of aspiration associated with PO intake  Pt will complete PO trials of upgraded diet textures with SLP only to determine the least restrictive PO diet to maintain adequate nutrition/hydration with no more than 1 overt s/s of pen/asp. Long Term Goals:   Pt will maintain adequate nutrition/hydration via PO intake of the least restrictive oral diet with implementation of safe swallow/ compensatory strategies and decrease signs/symptoms of aspiration to less than 1 x/day. OTHER RECOMMENDATIONS:  A Speech, Language and Cognitive Evaluation is recommended and requires a physician order     Patient/family Goal:    To be able to eat/drink again    Plan of care discussed with Patient   The Patient did not demonstrate complete understanding of the diagnosis, prognosis and plan of care     Rehabilitation Potential/Prognosis: fair                    ADMITTING DIAGNOSIS: Brain mass [G93.89]    VISIT DIAGNOSIS:      PATIENT REPORT/COMPLAINT: n/a    RN cleared patient for participation in assessment     yes     PRIOR LEVEL OF SWALLOW FUNCTION:    PAST HISTORY OF DYSPHAGIA?: none reported    Diet during hospital admission: NPO   PROCEDURE:  Consistencies Administered During the Evaluation   Liquids: thin liquid and nectar thick liquid   Solids:  pureed foods      Method of Intake:   cup, spoon  Fed by clinician      Position:   Seated, upright    CLINICAL ASSESSMENT:  Oral Stage:       Delayed A-P transit due to: cognitive function       Pharyngeal Stage:    Immediate wet cough was noted after presentation of thin liquid  No other signs of aspiration were noted during this evaluation however, silent aspiration cannot be ruled out at bedside. If silent aspiration is suspected, a Videofluoroscopic Study of Swallowing (MBS) is recommended and requires a physician order.     Cognition:   Did not follow commands, Confusion noted and Language impaired,     Oral Peripheral Examination   Generalized oral weakness- unable to assess secondary to poor ability to follow directions     Current Respiratory Status    6 liters nasal cannula     Parameters of Speech Production  Respiration:  Adequate for speech production  Quality:   Strained  Intensity: Quiet    Volitional Swallow: present     Volitional Cough:   present     Pain: No pain reported. EDUCATION:   The Speech Language Pathologist (SLP) completed education regarding results of evaluation and that intervention is warranted at this time. Learner: Patient  Education: Reviewed results and recommendations of this evaluation and Reviewed diet and strategies  Evaluation of Education:  No evidence of learning    This plan may be re-evaluated and revised as warranted. Evaluation Time includes thorough review of current medical information, gathering information on past medical history/social history and prior level of function, completion of standardized testing/informal observation of tasks, assessment of data and education on plan of care and goals. [x]The admitting diagnosis and active problem list, have been reviewed prior to initiation of this evaluation.         ACTIVE PROBLEM LIST:   Patient Active Problem List   Diagnosis    Morbid obesity ([address]    Brain mass    Brain bleed ([address]    Essential hypertension    Depression    Leukocytosis    AMS (altered mental status)    Morbid obesity with BMI of 50.0-59.9, adult (HCC)    Hyponatremia    Paroxysmal atrial fibrillation ([address]    Liver lesion - INCIDENTAL         CPT code:  43418  bedside swallow elicia Barney., YARI-SLP  Speech-Language Pathologist  [id number]  8/25/2021

## 2021-08-26 LAB
ALBUMIN SERPL-MCNC: 3.5 G/DL (ref 3.5–5.2)
ALP BLD-CCNC: 22 U/L (ref 35–104)
ALT SERPL-CCNC: 40 U/L (ref 0–32)
ANION GAP SERPL CALCULATED.3IONS-SCNC: 6 MMOL/L (ref 7–16)
AST SERPL-CCNC: 32 U/L (ref 0–31)
BILIRUB SERPL-MCNC: 0.5 MG/DL (ref 0–1.2)
BLOOD CULTURE, ROUTINE: NORMAL
BUN BLDV-MCNC: 23 MG/DL (ref 6–20)
CALCIUM IONIZED: 1.34 MMOL/L (ref 1.15–1.33)
CALCIUM SERPL-MCNC: 9 MG/DL (ref 8.6–10.2)
CHLORIDE BLD-SCNC: 108 MMOL/L (ref 98–107)
CO2: 26 MMOL/L (ref 22–29)
CREAT SERPL-MCNC: 0.6 MG/DL (ref 0.5–1)
CULTURE, BLOOD 2: NORMAL
EKG ATRIAL RATE: 141 BPM
EKG Q-T INTERVAL: 300 MS
EKG QRS DURATION: 86 MS
EKG QTC CALCULATION (BAZETT): 444 MS
EKG R AXIS: -16 DEGREES
EKG T AXIS: -59 DEGREES
EKG VENTRICULAR RATE: 132 BPM
GFR AFRICAN AMERICAN: >60
GFR NON-AFRICAN AMERICAN: >60 ML/MIN/1.73
GLUCOSE BLD-MCNC: 178 MG/DL (ref 74–99)
HCT VFR BLD CALC: 40 % (ref 34–48)
HEMOGLOBIN: 12 G/DL (ref 11.5–15.5)
MAGNESIUM: 2.4 MG/DL (ref 1.6–2.6)
MCH RBC QN AUTO: 26.1 PG (ref 26–35)
MCHC RBC AUTO-ENTMCNC: 30 % (ref 32–34.5)
MCV RBC AUTO: 87 FL (ref 80–99.9)
METER GLUCOSE: 135 MG/DL (ref 74–99)
METER GLUCOSE: 158 MG/DL (ref 74–99)
METER GLUCOSE: 164 MG/DL (ref 74–99)
METER GLUCOSE: 172 MG/DL (ref 74–99)
METER GLUCOSE: 182 MG/DL (ref 74–99)
PDW BLD-RTO: 15.1 FL (ref 11.5–15)
PHOSPHORUS: 1.9 MG/DL (ref 2.5–4.5)
PLATELET # BLD: 194 E9/L (ref 130–450)
PMV BLD AUTO: 11.5 FL (ref 7–12)
POTASSIUM SERPL-SCNC: 4.4 MMOL/L (ref 3.5–5)
RBC # BLD: 4.6 E12/L (ref 3.5–5.5)
SODIUM BLD-SCNC: 140 MMOL/L (ref 132–146)
TOTAL PROTEIN: 6.3 G/DL (ref 6.4–8.3)
WBC # BLD: 10.3 E9/L (ref 4.5–11.5)

## 2021-08-26 PROCEDURE — 92526 ORAL FUNCTION THERAPY: CPT | Performed by: SPEECH-LANGUAGE PATHOLOGIST

## 2021-08-26 PROCEDURE — 82962 GLUCOSE BLOOD TEST: CPT

## 2021-08-26 PROCEDURE — 36415 COLL VENOUS BLD VENIPUNCTURE: CPT

## 2021-08-26 PROCEDURE — 6360000002 HC RX W HCPCS: Performed by: NURSE PRACTITIONER

## 2021-08-26 PROCEDURE — 2500000003 HC RX 250 WO HCPCS: Performed by: NURSE PRACTITIONER

## 2021-08-26 PROCEDURE — 2700000000 HC OXYGEN THERAPY PER DAY

## 2021-08-26 PROCEDURE — 92523 SPEECH SOUND LANG COMPREHEN: CPT | Performed by: SPEECH-LANGUAGE PATHOLOGIST

## 2021-08-26 PROCEDURE — 82330 ASSAY OF CALCIUM: CPT

## 2021-08-26 PROCEDURE — 2580000003 HC RX 258: Performed by: INTERNAL MEDICINE

## 2021-08-26 PROCEDURE — 84100 ASSAY OF PHOSPHORUS: CPT

## 2021-08-26 PROCEDURE — 6370000000 HC RX 637 (ALT 250 FOR IP): Performed by: SURGERY

## 2021-08-26 PROCEDURE — 6370000000 HC RX 637 (ALT 250 FOR IP): Performed by: NURSE PRACTITIONER

## 2021-08-26 PROCEDURE — 2580000003 HC RX 258: Performed by: NURSE PRACTITIONER

## 2021-08-26 PROCEDURE — 6360000002 HC RX W HCPCS: Performed by: NEUROLOGICAL SURGERY

## 2021-08-26 PROCEDURE — 83735 ASSAY OF MAGNESIUM: CPT

## 2021-08-26 PROCEDURE — 93010 ELECTROCARDIOGRAM REPORT: CPT | Performed by: INTERNAL MEDICINE

## 2021-08-26 PROCEDURE — 99291 CRITICAL CARE FIRST HOUR: CPT | Performed by: SURGERY

## 2021-08-26 PROCEDURE — APPSS15 APP SPLIT SHARED TIME 0-15 MINUTES: Performed by: NURSE PRACTITIONER

## 2021-08-26 PROCEDURE — 97166 OT EVAL MOD COMPLEX 45 MIN: CPT

## 2021-08-26 PROCEDURE — 2500000003 HC RX 250 WO HCPCS: Performed by: SURGERY

## 2021-08-26 PROCEDURE — 97162 PT EVAL MOD COMPLEX 30 MIN: CPT

## 2021-08-26 PROCEDURE — 6360000002 HC RX W HCPCS: Performed by: INTERNAL MEDICINE

## 2021-08-26 PROCEDURE — 2000000000 HC ICU R&B

## 2021-08-26 PROCEDURE — 80053 COMPREHEN METABOLIC PANEL: CPT

## 2021-08-26 PROCEDURE — 97530 THERAPEUTIC ACTIVITIES: CPT

## 2021-08-26 PROCEDURE — 92507 TX SP LANG VOICE COMM INDIV: CPT | Performed by: SPEECH-LANGUAGE PATHOLOGIST

## 2021-08-26 PROCEDURE — C9113 INJ PANTOPRAZOLE SODIUM, VIA: HCPCS | Performed by: NURSE PRACTITIONER

## 2021-08-26 PROCEDURE — 85027 COMPLETE CBC AUTOMATED: CPT

## 2021-08-26 RX ORDER — LEVETIRACETAM 100 MG/ML
750 SOLUTION ORAL 2 TIMES DAILY
Status: DISCONTINUED | OUTPATIENT
Start: 2021-08-26 | End: 2021-08-31 | Stop reason: HOSPADM

## 2021-08-26 RX ORDER — ACETAMINOPHEN 325 MG/1
650 TABLET ORAL EVERY 6 HOURS PRN
Status: DISCONTINUED | OUTPATIENT
Start: 2021-08-26 | End: 2021-08-26

## 2021-08-26 RX ORDER — LABETALOL HYDROCHLORIDE 5 MG/ML
10 INJECTION, SOLUTION INTRAVENOUS EVERY 10 MIN PRN
Status: DISCONTINUED | OUTPATIENT
Start: 2021-08-26 | End: 2021-08-27

## 2021-08-26 RX ORDER — HYDRALAZINE HYDROCHLORIDE 20 MG/ML
10 INJECTION INTRAMUSCULAR; INTRAVENOUS EVERY 10 MIN PRN
Status: DISCONTINUED | OUTPATIENT
Start: 2021-08-26 | End: 2021-08-27

## 2021-08-26 RX ORDER — ACETAMINOPHEN 325 MG/1
650 TABLET ORAL EVERY 6 HOURS PRN
Status: DISCONTINUED | OUTPATIENT
Start: 2021-08-26 | End: 2021-08-31 | Stop reason: HOSPADM

## 2021-08-26 RX ORDER — LANSOPRAZOLE
15 KIT
Status: DISCONTINUED | OUTPATIENT
Start: 2021-08-27 | End: 2021-08-31 | Stop reason: HOSPADM

## 2021-08-26 RX ADMIN — Medication 10 ML: at 08:56

## 2021-08-26 RX ADMIN — LITHIUM CARBONATE 300 MG: 300 CAPSULE ORAL at 08:56

## 2021-08-26 RX ADMIN — LEVETIRACETAM 750 MG: 1500 INJECTION, SOLUTION INTRAVENOUS at 04:05

## 2021-08-26 RX ADMIN — METOPROLOL TARTRATE 25 MG: 25 TABLET, FILM COATED ORAL at 12:55

## 2021-08-26 RX ADMIN — INSULIN LISPRO 3 UNITS: 100 INJECTION, SOLUTION INTRAVENOUS; SUBCUTANEOUS at 07:03

## 2021-08-26 RX ADMIN — DEXAMETHASONE SODIUM PHOSPHATE 4 MG: 4 INJECTION, SOLUTION INTRAMUSCULAR; INTRAVENOUS at 15:07

## 2021-08-26 RX ADMIN — LITHIUM CARBONATE 300 MG: 300 CAPSULE ORAL at 12:55

## 2021-08-26 RX ADMIN — DEXAMETHASONE SODIUM PHOSPHATE 4 MG: 4 INJECTION, SOLUTION INTRAMUSCULAR; INTRAVENOUS at 03:04

## 2021-08-26 RX ADMIN — Medication 10 ML: at 20:31

## 2021-08-26 RX ADMIN — HYDRALAZINE HYDROCHLORIDE 10 MG: 20 INJECTION INTRAMUSCULAR; INTRAVENOUS at 00:43

## 2021-08-26 RX ADMIN — PAROXETINE 40 MG: 20 TABLET, FILM COATED ORAL at 08:56

## 2021-08-26 RX ADMIN — AMLODIPINE BESYLATE 5 MG: 5 TABLET ORAL at 08:56

## 2021-08-26 RX ADMIN — LITHIUM CARBONATE 300 MG: 300 CAPSULE ORAL at 20:29

## 2021-08-26 RX ADMIN — INSULIN LISPRO 3 UNITS: 100 INJECTION, SOLUTION INTRAVENOUS; SUBCUTANEOUS at 16:41

## 2021-08-26 RX ADMIN — HYDROMORPHONE HYDROCHLORIDE 0.5 MG: 1 INJECTION, SOLUTION INTRAMUSCULAR; INTRAVENOUS; SUBCUTANEOUS at 01:50

## 2021-08-26 RX ADMIN — METOPROLOL TARTRATE 5 MG: 1 INJECTION, SOLUTION INTRAVENOUS at 00:21

## 2021-08-26 RX ADMIN — VANCOMYCIN HYDROCHLORIDE 1750 MG: 10 INJECTION, POWDER, LYOPHILIZED, FOR SOLUTION INTRAVENOUS at 10:35

## 2021-08-26 RX ADMIN — HYDRALAZINE HYDROCHLORIDE 10 MG: 20 INJECTION INTRAMUSCULAR; INTRAVENOUS at 01:37

## 2021-08-26 RX ADMIN — LEVETIRACETAM 750 MG: 500 SOLUTION ORAL at 16:40

## 2021-08-26 RX ADMIN — METOPROLOL TARTRATE 25 MG: 25 TABLET, FILM COATED ORAL at 20:29

## 2021-08-26 RX ADMIN — METOPROLOL TARTRATE 5 MG: 1 INJECTION, SOLUTION INTRAVENOUS at 06:09

## 2021-08-26 RX ADMIN — DEXAMETHASONE SODIUM PHOSPHATE 4 MG: 4 INJECTION, SOLUTION INTRAMUSCULAR; INTRAVENOUS at 08:56

## 2021-08-26 RX ADMIN — LITHIUM CARBONATE 300 MG: 300 CAPSULE ORAL at 16:41

## 2021-08-26 RX ADMIN — INSULIN LISPRO 2 UNITS: 100 INJECTION, SOLUTION INTRAVENOUS; SUBCUTANEOUS at 20:29

## 2021-08-26 RX ADMIN — INSULIN LISPRO 3 UNITS: 100 INJECTION, SOLUTION INTRAVENOUS; SUBCUTANEOUS at 11:30

## 2021-08-26 RX ADMIN — VANCOMYCIN HYDROCHLORIDE 1750 MG: 10 INJECTION, POWDER, LYOPHILIZED, FOR SOLUTION INTRAVENOUS at 22:04

## 2021-08-26 RX ADMIN — DEXAMETHASONE SODIUM PHOSPHATE 4 MG: 4 INJECTION, SOLUTION INTRAMUSCULAR; INTRAVENOUS at 20:29

## 2021-08-26 RX ADMIN — PANTOPRAZOLE SODIUM 40 MG: 40 INJECTION, POWDER, FOR SOLUTION INTRAVENOUS at 08:55

## 2021-08-26 RX ADMIN — POTASSIUM PHOSPHATE, MONOBASIC AND POTASSIUM PHOSPHATE, DIBASIC 20 MMOL: 224; 236 INJECTION, SOLUTION, CONCENTRATE INTRAVENOUS at 10:45

## 2021-08-26 RX ADMIN — SODIUM CHLORIDE, PRESERVATIVE FREE 10 ML: 5 INJECTION INTRAVENOUS at 08:55

## 2021-08-26 ASSESSMENT — PAIN SCALES - GENERAL
PAINLEVEL_OUTOF10: 0
PAINLEVEL_OUTOF10: 4
PAINLEVEL_OUTOF10: 0

## 2021-08-26 NOTE — PROGRESS NOTES
SPEECH/LANGUAGE PATHOLOGY  SPEECH/LANGUAGE/COGNITIVE EVALUATION   and PLAN OF CARE      PATIENT NAME:  Zachary Larson  (female)     MRN:  [id number]    :  1965  (60 y.o.)  STATUS:  Inpatient: Room 3818/3818-A    TODAY'S DATE:  21 1415   SLP eval and treat Start: 21, End: 21, ONE TIME, Standing Count: 1 Occurrences, R    Lily Senior, APRN - CNS  REASON FOR REFERRAL:  cognitive  EVALUATING THERAPIST: HILTON Akhtar    ADMITTING DIAGNOSIS: Brain mass [G93.89]    VISIT DIAGNOSIS:      SPEECH THERAPY  PLAN OF CARE   The speech therapy  POC is established based on physician order, speech pathology diagnosis and results of clinical assessment     SPEECH PATHOLOGY DIAGNOSIS:    Severe Receptive/ Expressive Aphasia (suspected Wernicke's aphasia)    Speech Pathology intervention is recommended 3-6 times per week for LOS or when goals are met with emphasis on the following:      Conditions Requiring Skilled Therapeutic Intervention for speech, language and/or cognition    Receptive Aphasia  Expressive Aphasia     Specific Speech Therapy Interventions to Include:   Receptive language training   Expressive language training   Therapeutic tasks for Cognition    Specific instructions for next treatment:      To initiate language tasks    SHORT/LONG TERM GOALS  Pt will Improve receptive language skills for comprehension of simple level yes/no questions, basic commands (auditory/written format), and for comprehension of basic conversation with 60% accuracy  Pt will improve expressive language skills to improve accuracy of completion of automatic speech tasks, object/picture naming, phrase completion, and phrasal expression of basic wants and needs with 60% accuracy    Patient goals: Patient/family involved in developing goals and treatment plan:   Treatment goals discussed with Patient    The Patient did not demonstrate complete understanding of the diagnosis, prognosis and plan of care   The patient/family Did not state,     This plan may be re-evaluated and revised as warranted. Rehabilitation Potential/Prognosis: fair                CLINICAL ASSESSMENT:  MOTOR SPEECH       Oral Peripheral Examination   Generalized oral weakness    Parameters of Speech Production  Respiration:  Adequate for speech production  Articulation:  Within functional limits  Resonance:  Within functional limits  Quality:   Within functional limits  Pitch: Within functional limits  Intensity: Within functional limits  Fluency:  Intact  Prosody Monotone    RECEPTIVE LANGUAGE    Comprehension of Yes/No Questions:   Impaired    Process  Simple Verbal Commands:   Impaired  Process Intermediate Verbal Commands:   Could not test  Process Complex Verbal Commands:     Could not test    Comprehension of Conversation:      Could not test      EXPRESSIVE LANGUAGE     Serials: Impaired    Imitation:  Words   Impaired   Sentences To be assessed    Naming:  (Modality used:  Verbal)  Confrontation Naming  Impaired  Functional Description  To be assessed  Response Naming: Impaired    Conversation:      Literal paraphasic errors were noted, Semantic paraphasic errors were noted, Neologistic errors were noted and Running jargon was present    Noted that some automatic, situational phrases were preserved. COGNITION     Attention/Orientation  Attention: Sustained attention   Orientation:  Oriented to Person    Memory /Organization/Problem Solving/Reasoning   Could not assess       CLINICAL OBSERVATIONS NOTED DURING THE EVALUATION  Perseveration errors, Anomic errors, Paraphasic errors and Cueing was required                  EDUCATION:   The Speech Language Pathologist (SLP) completed education regarding results of evaluation and that intervention is warranted at this time.   Learner: Patient  Education: Reviewed results and recommendations of this evaluation  Evaluation of Education:  No evidence of learning    Evaluation Time includes thorough review of current medical information, gathering information on past medical history/social history and prior level of function, completion of standardized testing/informal observation of tasks, assessment of data and education on plan of care and goals. CPT code:    21168  eval speech sound lang comprehension / [phone] speech and language therapy    Multimodal cues provided in attempts to increase Pt's ability to follow 1 step commands, complete naming tasks, automatic speech tasks, and respond to simple wh- questions. Pt responds in appropriate turn taking fashion, however speech in nonsensical, paraphasic, perseverated, and jargon at different points with Pt unaware. Required consistent cues to assist in proper use of spoon. Will cont POC. The admitting diagnosis and active problem list, as listed below have been reviewed prior to initiation of this evaluation.         ACTIVE PROBLEM LIST:   Patient Active Problem List   Diagnosis    Morbid obesity ([address]    Brain mass    Brain bleed ([address]    Essential hypertension    Depression    Leukocytosis    AMS (altered mental status)    Morbid obesity with BMI of 50.0-59.9, adult (HCC)    Hyponatremia    Paroxysmal atrial fibrillation ([address]    Liver lesion - INCIDENTAL       Kelly Zabala, CCC-SLP  Speech-Language Pathologist  [id number]  8/26/2021

## 2021-08-26 NOTE — PROGRESS NOTES
Met with patient at bedside, no family present. She passed her bedside swallow yesterday and had no issues overnight. Continue to wait for biopsy results to determine future plans.

## 2021-08-26 NOTE — PROGRESS NOTES
Sister \"Jessica\" called in to check on how pt was doing. I informed her that she was not on the contact list and that she could contact pt's  who is POA.  requests we only give him updates, as he is the POA.

## 2021-08-26 NOTE — PROGRESS NOTES
[address]       Date:2021                                                               Patient Name: George Sloan  MRN: [id number]  : 1965  Room: [address] [place] [address]    Evaluating OT: Elvia Gaston OTR/L [id number]    Referring Provider: SKY Mcknight CNS   Specific Provider Orders/Date: OT eval and treat (21)       Diagnosis: Brain mass      Reason for admission: AMS & emesis; expressive aphasia    Surgery/Procedures:  L temporoparietal craniotomy      Pertinent Medical History: Asthma, Bipolar, DM, HTN, Thyroid disease       *Precautions:  Fall Risk, bed alarm, ICP monitor, aphasia, R hemiparesis and inattention, SBP<140, puree/honey thick dysphagia diet per chart    Assessment of current deficits   [x] Functional mobility  [x]ADLs  [x] Strength               [x]Cognition   [x] Functional transfers   [x] IADLs         [x] Safety Awareness   [x]Endurance   [x] Fine Coordination        [x] ROM     [x] Vision/perception   [x]Sensation    [x]Gross Motor Coordination [x] Balance   [x] Delirium                  [x]Motor Control     [x] Communication    OT PLAN OF CARE   OT POC based on physician orders, patient diagnosis and results of clinical assessment.        Frequency/Duration: 1-3 days/wk for 1-2 weeks PRN    Specific OT Treatment to include:   ADL retraining/adapted techniques and AE recommendations to increase functional independence within precautions                    Energy conservation techniques to improve tolerance for selfcare routine   Functional transfer/mobility training/DME recommendations for increased independence, safety and fall prevention         Patient/family education to increase safety and functional independence within precautions              Environmental modifications for safe mobility and completion of ADLs Cognitive retraining ex's to improve problem solving skills & safe participation in ADLs/IADLs  Sensory re-education techniques to improve extremity awareness, maintain skin integrity and improve hand function                             Visual/Perceptual retraining  to improve body awareness and safety during transfers and ADLs  Splinting/positioning needs to maintain joint/skin integrity and prevent contracture  Therapeutic activity to improve functional performance during ADLs/IADLs                                         Therapeutic exercise to improve tolerance and functional strength for ADLs   Balance retraining exercises/tasks for facilitation of postural control with dynamic challenges during ADLs . Positioning to improve functional independence  Neuromuscular re-education: facilitation of righting/equilibrium reactions, normalization muscle tone/facilitation active functional movement                      Delirium prevention/treatment    Modified [place] Scale   Score     Description  0             No symptoms  1             No significant disability despite symptoms  2             Slight disability; able to look after own affairs  3             Moderate disability; able to ambulate without assist/ requires assist with ADLs  4             Moderate/Severe disability;requires assist to ambulate/assist with ADLs  5             Severe disability;bedridden/incontinent   6               Score:   5    Recommended Adaptive Equipment: TBA     Home Living: Per chart, pt lives with   in a 1 floor plan with 1 step(s) to enter. Pt cannot report due to aphasia. Bathroom setup: unknown  Equipment owned: unknown    Prior Level of Function: IND with ADLs;  IND with IADLs. No device for ambulation. Pain Level: pt c/o 0/10 pain  this session; no indication of pain during session.     Cognition: A&O: 1/4  (Pt able to state her name, husbands name when asked) Pt not able to provide answers to orientation questions when given choices. Follows gross 1 step commands with prompts and cues for comprehension. Memory: difficult to assess   Comprehension fair- simple tasks   Problem solving: poor   Judgement/safety: poor               Communication skills: poor+ due to aphasia. Pt with occasional clear responses. Words at times incomprehensible. Vision: impaired tracking; finger ID grossly appropriate. (copies therapist holding up 2 fingers)               Glasses:not present in room                                                   Hearing: grossly WFL     RASS: 0  CAM-ICU: (NT) Delirium- unable to assess due to aphasia    UE Assessment:  Hand Dominance: Right []  Left [] Pt not able to report     ROM Strength STM goal: PRN   RUE  PROM WFL; gross shoulder flexion/elbow flexion and weak grasp observed. Pt does not comprehend ROM testing. 2-/5 shoulder  3-/5 elbow  3-/5 hand    *impaired [facility] skills WFL; 3+/5     LUE PROM WFL    A. AROM: grossly WFL with impaired [facility] 3-/5 proximal  3+/5 distal WFL for ADLS; 4-/5       Sensation:unable to assess  Tone: WFL  Edema: min to moderate edema R UE (greater in hand)     Functional Assessment:  AM-PAC Daily Activity Raw Score: 7/24   Initial Eval Status  Date: 8/26 Treatment Status  Date: STGs = LTGs  Time frame: 7-14 days   Feeding NT                        Min A  while seated up in chair to increase activity tolerance; AE as needed to complete tasks        Grooming Max A  with [place] assist to wash face; hands- pt required max cues to initiate and end tasks; encouraged use of R UE during tasks with fair- success. Min A   while seated supported; using R UE as fair functional assist & demonstrating G body awareness. UB dressing/bathing Dep                        Mod A  demo fair awareness of miguel techniques.        LB dressing/bathing Dep                        Max A   using AE as needed for safe reach/ energy conservation       Toileting Dep                             Bed Mobility  Supine to sit: Dep+2    Sit to supine: Dep+2                        Mod A  in prep of ADL tasks & transfers   Functional Transfers Sit to stand: NT    Stand to sit: NT                        Max A  sit<>stand/functional bathroom transfers using AD/DME as needed for balance and safety   Functional Mobility NT                       Max A   functional/bathroom mobility using AD as needed & demonstrating F safety     Balance Sitting:     Static:  Max A improving to Mod A    Dynamic:Max A  Standing: NT  Min A dynamic sitting balance; Max A dynamic standing balance  during ADL tasks & transfers   Endurance/Activity Tolerance   F tolerance with light activity. Tolerated sitting EOB >8 min with activity. F   tolerance with moderate activity/self care routine   Visual/  Perceptual Impaired: visual tracking to midline; L gaze with R sided inattention. R/L discrimination and R sided body awareness. Vitals:   HR at rest: 76 bpm HR at end of session: 78 bpm   Spo2 at rest:99% Spo2 at end of session 99%   BP at rest:136/58 mmHg BP at end of session 158/64 mmHg       Treatment: OT treatment provided this date includes:  Bed mobility: Instruction on precautions prior to bed mobility to facilitate safe transfers and ADLS. Pt required 2 person assist for safe mobility due to complexity of medical condition, medical lines and deconditioning. Balance retraining: Performed sitting balance ex's with instruction to facilitate righting reactions with postural changes during ADLS. Pt required cues to maintain midline orientation; sitting balance improving with positioning EOB. ADL retraining: Instruction on adapted techniques/B UE integration tasks to increase body awareness and UE hand function. Pt required [address] assist to initiate and follow through. Pt perseverates on tasks. ROM/exercise: B UE A/AROM to improve functional reach and coordination for ADLs.   Pt demo impaired awareness of R UE during ex's and light ADLs. Cognition/Perception: Cognitive/Language & perceptual retraining ex's throughout session to improve attention, body awareness and problem solving skills for participation in light ADLs. Delirium Prevention: Environmental and sensory modifications assessed and implemented to decrease ICU acquired delirium and to improve overall orientation, mentation and pt interaction with family/staff. Line management and environmental modifications made prior to and end of session to ensure patient safety and to increase efficiency of session. Skilled monitoring of HR, O2 saturation, blood pressure and patient's response to activity performed throughout session. Comments: OK from NP to see patient. Upon arrival, patient resting in bed, alert and agreeable to session. Pt aphasic but cooperative. Pt demo fair tolerance with fair- understanding of education/techniques. At end of session, patient returned to bed with bed in partial chair position; B UE's in elevation with heel protectors intact. Towel roll placed in R UE for functional positioning. Call light within reach, all lines and tubes intact. Pt instructed on use of call light for assistance and fall prevention. .    Patient presents with decreased ROM/strength,activity tolerance, dynamic balance, functional mobility, language, cognitive and perceptual deficits  limiting completion of ADLs and safety. Pt can benefit from continued skilled OT to increase safety, functional independence and quality of life. Rehab Potential: Fair for established goals    Patient / Family Goal: pt cannot report    Patient and/or family were instructed/educated on diagnosis, prognosis/goals and plan of care. Pt demonstrated impaired understanding. Evaluation Complexity: Moderate     · History: Expanded chart review of consults, imaging, and psychosocial history related to current functional performance.    · Exam: 5+ performance deficits identified limiting functional independence and safe return home   · Assistance/Modification: Min/mod assistance or modifications required to perform tasks. May have comorbidities that affect occupational performance. [] Malnutrition indicators have been identified and nursing has been notified to ensure a dietitian consult is ordered. Time BQ:1212             Time Out: 1025         Total Treatment time: 15   Min Units   OT Eval Low 22931     OT Eval Medium 48265 X    OT Eval High 32585     OT Re-Eval [id number]     Therapeutic Ex 70518     Therapeutic Activities 73716 15 1   ADL/Self Care 26696     Orthotic Management 72935     Neuro Re-Ed 49889     Non-Billable Time        Evaluation time includes thorough review of current medical information, gathering information on past medical history/social history and prior level of function, completion of standardized testing/informal observation of tasks, assessment of data and development of POC/Goals.      Shayy Jean Baptiste, OTR/L [id number]

## 2021-08-26 NOTE — PROGRESS NOTES
Pharmacy Consultation Note  (Antibiotic Dosing and Monitoring)    Initial consult date: 8/23/21  Consulting provider: Sancho MACKEY  Drug: Vancomycin  Indication: Surgical Prophylaxis - ICP monitor in place    Age/  Gender Height Weight IBW  Allergy Information   56 y.o./female 5' 4\" (162.6 cm) (per 8/20 docuementation)162.6 cm (!) 332 lb (150.6 kg) (per 8/20 documentation)  150.6 kg   Ideal body weight: 54.7 kg (120 lb 9.5 oz)  Adjusted ideal body weight: 93.1 kg (205 lb 2.5 oz)   Clindamycin/lincomycin, Iodine, Penicillins, and Sulfa antibiotics      Renal Function:  Recent Labs     08/25/21  0100 08/25/21  0510 08/26/21  0415   BUN 21* 23* 23*   CREATININE 0.6 0.6 0.6       Intake/Output Summary (Last 24 hours) at 8/26/2021 0730  Last data filed at 8/26/2021 0600  Gross per 24 hour   Intake 2620 ml   Output 1585 ml   Net 1035 ml       Vancomycin Monitoring:  Trough:    Recent Labs     08/24/21  2140   [address] 13.3     Random:  No results for input(s): VANCORANDOM in the last 72 hours. Vancomycin Administration Times:  Recent vancomycin administrations                   vancomycin (VANCOCIN) 1,750 mg in sodium chloride 0.9 % 500 mL IVPB (mg) 1,750 mg New Bag 08/25/21 2200     1,750 mg New Bag  1013     1,750 mg New Bag 08/24/21 2244     1,750 mg New Bag  1100     1,750 mg New Bag 08/23/21 2313    vancomycin (VANCOCIN) injection (mg) 500 mg Given 08/23/21 1154    vancomycin (VANCOCIN) 2,000 mg in dextrose 5 % 500 mL IVPB (mg) 2,000 mg New Bag 08/23/21 1020                  Assessment:  · Patient is a 60 y.o. female who has been initiated on vancomycin  Estimated Creatinine Clearance: 150 mL/min (based on SCr of 0.6 mg/dL). · Vancomycin trough 13.3 mcg/mL on 8/24; Goal trough = 10 - 20 mcg/ml;  Goal AUC/EZRA 400-600  · Received vancomycin 2000 mg IV x 1 pre-operatively yesterday    Plan:  · Continue Vancomycin 1750 mg IV every 12 hours  · Will continue to check vancomycin trough levels and will adjust as needed  · Will continue to monitor renal function   · Clinical pharmacy to follow    Shahab Bravo, [place], BCPS, [facility]  8/26/2021  7:32 AM

## 2021-08-26 NOTE — PLAN OF CARE
Problem: Pain:  Goal: Recognizes and communicates pain  Description: Recognizes and communicates pain  8/26/2021 0148 by Korina Irwin RN  Outcome: Met This Shift  8/25/2021 1727 by Lisa Madrigal RN  Outcome: Met This Shift     Problem: Injury - Risk of, Healthcare-Acquired Condition:  Goal: Absence of healthcare acquired conditions  Description: Absence of healthcare acquired conditions  8/26/2021 0148 by Korina Irwin RN  Outcome: Met This Shift  8/25/2021 1727 by Lisa Madrigal RN  Outcome: Met This Shift     Problem: Injury - Risk of, Healthcare-Acquired Condition:  Goal: Will remain free from falls  Description: Will remain free from falls  8/26/2021 0148 by Korina Irwin RN  Outcome: Met This Shift  8/25/2021 1727 by Lisa Madrigal RN  Outcome: Met This Shift     Problem: Injury - Risk of, Healthcare-Acquired Condition:  Goal: Absence of pressure ulcer  Description: Absence of pressure ulcer  8/26/2021 0148 by Korina Irwin RN  Outcome: Met This Shift  8/25/2021 1727 by Lisa Madrigal RN  Outcome: Met This Shift     Problem: Injury - Risk of, Healthcare-Acquired Condition:  Goal: Demonstration of wound healing without infection will improve  Description: Demonstration of wound healing without infection will improve  8/26/2021 0148 by Korina Irwin RN  Outcome: Met This Shift  8/25/2021 1727 by Lisa Madrigal RN  Outcome: Met This Shift     Problem: Injury - Risk of, Healthcare-Acquired Condition:  Goal: Absence of urinary tract infection signs and symptoms  Description: Absence of urinary tract infection signs and symptoms  8/26/2021 0148 by Korina Irwin RN  Outcome: Met This Shift  8/25/2021 1727 by Lisa Madrigal RN  Outcome: Met This Shift     Problem: Psychosocial Distress:  Goal: Absence of psychosocial distress  Description: Absence of psychosocial distress  Outcome: Met This Shift     Problem: Skin Integrity:  Goal: Will show no infection signs and symptoms  Description: Will show no infection signs

## 2021-08-26 NOTE — CARE COORDINATION
SOCIAL WORK/CASEMANAGEMENT TRANSITION OF CARE [id number]rie Armas, [email or web address], [facility], [phone]): I met with pt's spouse in the waiting room this afternoon. We went over PT and OT evals and the need for inpatient rehab when discharge is here. I left in the room for him doug and acute rehab lists and went over criteria. If aru is needed and pt is able to participate then he wants [facility] main. I asked him to start looking over doug lists and come up with 3 choices. Sw/cm to follow.  MARYCRUZ Black  8/26/2021

## 2021-08-26 NOTE — PROGRESS NOTES
SPEECH LANGUAGE PATHOLOGY  DAILY PROGRESS NOTE        PATIENT NAME:  Nicole Winters      :  1965          TODAY'S DATE:  2021 ROOM:  [address] [place] [address]    Pt seen for ongoing dysphagia management. Assisted Pt in feeding lunch tray. Required mod-max assist to self feed with spoon. Moderately impulsive when self feeding. Also noted to have occasional R anterior loss with good ability to recognize/ clear. Pt demonstrated mild oral delay/ holding but prompt swallow onset and no overt s/s of pen/asp. Cont current diet of   Dysphagia 1, Pureed solids with  nectar consistency (mildly thick) liquids. Will cont to assess.        CPT code(s) 58987  dysphagia tx  Total minutes :  15 minutes

## 2021-08-26 NOTE — PROGRESS NOTES
Chief Complaint:  Brain mass     Subjective:    Seems her aphasia was slightly better today. Able to respond slightly better to simple questions. Objective:    /62   Pulse 67   Temp 98.8 °F (37.1 °C) (Oral)   Resp 13   Ht 5' 4\" (1.626 m)   Wt (!) 332 lb (150.6 kg)   SpO2 98%   BMI 56.99 kg/m²     Current medications that patient is taking have been reviewed. General appearance: Nontoxic, morbidly obese  HEENT: dressing on head, MMM  Neck: FROM, supple  Lungs: Clear to auscultation anteriorly, WOB normal  CV: RRR, no MRGs  Abdomen: Soft, non-tender; no masses or HSM, +BS  Extremities: No peripheral edema or digital cyanosis  Skin: no rash, lesions or ulcers  Psych: Calm and cooperative  Neuro: Alert and interactive, face symmetric, very aphasic, R arm weak, moving other extremities though difficult to gauge true strength due to weak effort.     Labs:  CBC with Differential:    Lab Results   Component Value Date    WBC 10.3 08/26/2021    RBC 4.60 08/26/2021    RBC 4.68 04/26/2017    HGB 12.0 08/26/2021    HCT 40.0 08/26/2021     08/26/2021    MCV 87.0 08/26/2021    MCH 26.1 08/26/2021    MCHC 30.0 08/26/2021    RDW 15.1 08/26/2021    LYMPHOPCT 7.8 08/20/2021    MONOPCT 1.9 08/20/2021    BASOPCT 0.2 08/20/2021    MONOSABS 0.26 08/20/2021    LYMPHSABS 1.06 08/20/2021    EOSABS 0.00 08/20/2021    BASOSABS 0.03 08/20/2021     CMP:    Lab Results   Component Value Date     08/26/2021    K 4.4 08/26/2021    K 4.7 08/21/2021     08/26/2021    CO2 26 08/26/2021    BUN 23 08/26/2021    CREATININE 0.6 08/26/2021    GFRAA >60 08/26/2021    LABGLOM >60 08/26/2021    GLUCOSE 178 08/26/2021    PROT 6.3 08/26/2021    LABALBU 3.5 08/26/2021    CALCIUM 9.0 08/26/2021    BILITOT 0.5 08/26/2021    ALKPHOS 22 08/26/2021    AST 32 08/26/2021    ALT 40 08/26/2021          Telemetry:  I've personally reviewed the patient's telemetry:  A few runs PAF this morning    Assessment/Plan:  Principal Problem: Brain mass  Active Problems:    Brain bleed (HCC)    Essential hypertension    Depression    Leukocytosis    AMS (altered mental status)    Morbid obesity with BMI of 50.0-59.9, adult (HCC)    Hyponatremia    Paroxysmal atrial fibrillation (HCC)    Liver lesion - INCIDENTAL  Resolved Problems:    * No resolved hospital problems. *       Await final path. Continue post op care per Neurosurgery. Contineu decadron and Keppra. New onset PAF. TTE pending. Continue metoprolol. Obviously no anticoagulation.   Will need outpatient rhythm monitoring    BP well controlled    Requires continued inpatient level of care   Dina Daniel MD    3:02 PM   8/26/2021

## 2021-08-26 NOTE — PROGRESS NOTES
Physical Therapy  Physical Therapy Initial Assessment     Name: Jason Galo  : 1965  MRN: [id number]      Date of Service: 2021    Evaluating PT:  Дмитрий Olson, PT, DPT [id number]    Room #:  7204/8245-E  Diagnosis:  Brain mass [G93.89]  PMHx/PSHx:  Asthma, Bipolar, DM, HTN  Procedure/Surgery:   L temporoparietal craniotomy   Precautions:  Falls, ICP monitor, aphasia, R hemiparesis, SBP < 140  Equipment Needs:  TBD    SUBJECTIVE:    Pt lives with  in a 1 story home with 1 stairs to enter, per chart. Pt ambulated without device and was independent PTA. OBJECTIVE:   Initial Evaluation  Date: 21 Treatment Short Term/ Long Term   Goals   AM-PAC 6 Clicks      Was pt agreeable to Eval/treatment? Yes     Does pt have pain?  No signs of pain     Bed Mobility  Rolling: NT  Supine to sit: Dep x 2 with HOB elevated  Sit to supine: Dep x 2  Scooting: Dep  ModA   Transfers Sit to stand: NT  Stand to sit: NT  Stand pivot: NT  ModA with AAD   Ambulation   NT  >25 feet with ModA with AAD   Stair negotiation: ascended and descended NT  >2 steps with 1 rail ModA   ROM BUE:  Defer to OT note  BLE:  WFL     Strength BUE:  Defer to OT note  LLE:  3+ to 4-/5 grossly  RLE:  3 to 3+/5 grossly  Increase by 1/3 MMT grade   Balance Sitting EOB:  MaxA to [address] [facility] [place]  Dynamic Standing:  NT  Sitting EOB:  Independent  Dynamic Standing:  ModA with AAD     Pt is A & O x 1  CAM-ICU: NT  RASS: 0  Sensation:  Unable to formally assess  Edema:  None    Vitals:  Heart Rate at rest 76 bpm Heart Rate post session 78 bpm   SpO2 at rest 99% SpO2 post session 99%   Blood Pressure at rest 136/58 mmHg Blood Pressure post session 158/64 mmHg       Functional Status Score-Intensive Care Unit (FSS-ICU)   Rolling -/7   Supine to sit transfer 1/7   Unsupported sitting  2/7   Sit to stand transfers -/7   Ambulation -/7   Total  -/35     Therapeutic Exercises:  BLE AROM x 5 reps in all planes    Patient education  Pt educated on safety    Patient response to education:   Pt verbalized understanding Pt demonstrated skill Pt requires further education in this area   no partial yes     ASSESSMENT:    Conditions Requiring Skilled Therapeutic Intervention:    [x]Decreased strength     []Decreased ROM  [x]Decreased functional mobility  [x]Decreased balance   [x]Decreased endurance   [x]Decreased posture  []Decreased sensation  []Decreased coordination   []Decreased vision  [x]Decreased safety awareness   []Increased pain       Comments:  NP reported pt was stable for session. Pt was in bed upon arrival, agreeable to initial evaluation. Pt aphasic throughout session but occasionally was able to provide an appropriate response. Total assistance provided for bed mobility. Posterior lean to L noted in sitting but improved with proper positioning. RLE weakness noted. Fatigue limited activity. Pt was left in bed with all needs met and call light in reach. All lines remained intact. RN in room. Treatment:  Patient practiced and was instructed in the following treatment:     Bed mobility training - pt given verbal and tactile cues to facilitate proper sequencing and safety during supine>sit as well as provided with physical assistance.  Sitting EOB for >8 minutes for upright tolerance, postural awareness and BLE ROM   Skilled positioning - Pt placed in the chair position with pillows utilized to facilitate upright posture, joint and skin integrity, and interaction with environment.  Non-pharmacological treatment and prevention of ICU delirium - Pt oriented to date, time, time of day, place, and situation as well as provided with visual and auditory stimuli in order to improve cognition and combat effects of ICU delirium. Pt's/ family goals   1. Unable to obtain    Prognosis is good for reaching above PT goals. Patient and or family understand(s) diagnosis, prognosis, and plan of care.   unsure    PHYSICAL THERAPY PLAN OF CARE:    PT POC is established based on physician order and patient diagnosis     Referring provider/PT Order:  Vika Curran, APRN - CNS/08/25/21 1415 PT eval and treat  Diagnosis:  Brain mass [G93.89]  Specific instructions for next treatment:  Progress activity    Current Treatment Recommendations:     [x] Strengthening to improve independence with functional mobility   [] ROM to improve independence with functional mobility   [x] Balance Training to improve static/dynamic balance and to reduce fall risk  [x] Endurance Training to improve activity tolerance during functional mobility   [x] Transfer Training to improve safety and independence with all functional transfers   [x] Gait Training to improve gait mechanics, endurance and asses need for appropriate assistive device  [] Stair Training in preparation for safe discharge home and/or into the community   [x] Positioning to prevent skin breakdown and contractures  [x] Safety and Education Training   [x] Patient/Caregiver Education   [] HEP  [] Other     PT long term treatment goals are located in above grid    Frequency of treatments: 2-5x/week x 1-2 weeks. Time in  0948  Time out  1015    Total Treatment Time 12 minutes     Evaluation Time includes thorough review of current medical information, gathering information on past medical history/social history and prior level of function, completion of standardized testing/informal observation of tasks, assessment of data and education on plan of care and goals.     CPT codes:  [] Low Complexity PT evaluation 77753  [x] Moderate Complexity PT evaluation 25733  [] High Complexity PT evaluation 16264  [] PT Re-evaluation 32818  [] Gait training 20320 - minutes  [] Manual therapy 65311 - minutes  [x] Therapeutic activities 42511 12 minutes  [] Therapeutic exercises 78486 - minutes  [] Neuromuscular reeducation 49403 - minutes     Bran PT, DPT  [id number]

## 2021-08-26 NOTE — PROGRESS NOTES
Surgical  Neuro Science Intensive Care Unit  Critical Care  Daily Progress Note 8/26/2021    Date of Admission: 08/21/2021    CC: Follow up for AMS and brain mass    HOSPITAL COURSE/OVERNIGHT EVENTS:    08/20  Presented to [facility] ED for AMS & vomiting. Expressive aphasia. Has had progressive neurological changes and headaches in past months. Symptoms worsened. H CT showed left temporoparietal cystic mass with surrounding edema and midline shift of 5 mm. Transferred to [facility] for ongoing care. 08/21  Admitted to ICU. No issues overnight. Passed a swallow. Given 1 dose of Decadron 10 mg. On decadron 4 mg every 6 hours. 08/22  No issues noted. Required 3 doses of antihypertensive agents. Did receive Tigan 2 doses. Much more alert this morning. Answers questions. Moving al extremities. Holding cup & drinking without difficulty   08/23  No acute events, asking appropriate questions, for OR this AM  08/24   Afebrile. ICP ranges to 21. Mannitol given this am.  Required 12 units of insulin coverage. Received more than 12 doses of prn antihypertensive agents. 08/25  Awake. Conversant. Word slad continues. No issues overnight. Required 18 units of insulin . Required 4 doses of antihypertensive agents. No further episodes of Afib reported. Passed swallow with speech therapy yesterday. 08/26  No issues overnight. Required 2 dose of prn antihypertensive agents  & 2 units of regular insulin. No further dysrhythmia issues. PHYSICAL EXAM:  BP (!) 153/66   Pulse 76   Temp 98.3 °F (36.8 °C) (Oral)   Resp 17   Ht 5' 4\" (1.626 m)   Wt (!) 332 lb (150.6 kg)   SpO2 98%   BMI 56.99 kg/m²      Intake/Output Summary (Last 24 hours) at 8/26/2021 0936  Last data filed at 8/26/2021 0900  Gross per 24 hour   Intake 2260 ml   Output 1715 ml   Net 545 ml     General appearance:  Comfortable.    Pain Description: none    NEUROLOGIC:   RASS Score:  0  GCS:  14  4 - Opens eyes on own   6 - Follows simple motor commands  4 - Seems confused, disoriented    Eyes open. Answers questions with word salad. Follows commands this am to wiggle both fingers & toes. Pupil size:  Left 3 mm  Right 3 mm  Pupil reaction: Yes   PERRLA  Wiggles fingers: Left   Yes  Right Yes  Hand grasp:   Left: Yes     Right    Yes  Wiggles toes: Left   Yes Right  Yes  Plantar flexion: Left  Yes  Right   Yes  Facial droop:   none   Speech:  clear    Crani incision:  CDI. ICP:  118-24 mm Hg. CPP:  67-94 mm Hg. CONSTITUTIONAL: No acute distress, lying in hospital bed. CARDIOVASCULAR: S1 S2, regular rate, regular rhythm, no murmur/gallop/rub. Monitor: NSR. PULMONARY: Bilaterally clear. No rhonchi/rales/wheezes, no use of accessory muscles. RENAL:  [place] to gravity, clear yellow urine. Fluid balance for previous 24 hours: + 945 ml. ABDOMEN: Soft, nontender, nondistended, nontympanic, normal bowel sounds. Drinknt contrast for CT scan. No reported nausea or vomiting. MUSCULOSKELETAL:  Moves all extremities to command weakly. SKIN/EXTREMITIES: No rashes/ecchymosis, no edema/clubbing, warm/dry, good capillary refill. LINES:   Peripheral     Arterial line. Right radial.  Day 3.   Good waveform/     Recent Labs     08/24/21  0430 08/25/21  0510 08/26/21  0415   WBC 15.8* 11.2 10.3   HGB 13.5 12.6 12.0   HCT 44.0 41.2 40.0   MCV 84.9 86.7 87.0    202 194       Recent Labs     08/25/21  0100 08/25/21  0510 08/26/21  0415    144 140   K 4.7 4.8 4.4   CO2 27 20* 26   PHOS 2.8 2.6 1.9*   BUN 21* 23* 23*   CREATININE 0.6 0.6 0.6       Recent Labs     08/24/21  0430 08/25/21  0510 08/26/21  0415   PROT 6.9 6.5 6.3*     ASSESSMENT/PLAN:     Principal Problem:    Brain mass  Active Problems:    Brain bleed (HCC)    Essential hypertension    Depression    Leukocytosis    AMS (altered mental status)    Morbid obesity with BMI of 50.0-59.9, adult (HCC)    Hyponatremia    Paroxysmal atrial fibrillation ([address]    Liver lesion - INCIDENTAL  Resolved Problems:    * No resolved hospital problems. 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 S/P Craniotomy for brain biopsy. Hx of bipolar disorder & depression. Monitor neuro status. Neurosurgery following. Keppra for seizure prophylaxis. Lithium. Paxil. Decadron. CV:  No acute issues. Hx of HTN    Monitor hemodynamics. BP goal systolic less than 124 mm Hg. PRN hydralzine & labetalol. Norvasc. Metoprolol. Pulm: No acute issues. Monitor RR & SpO2. O2 as needed. PRN  Albuterol. Encourage cough, SMI  & deep breathing. GI: No acute issues. Morbid obesity. BMI 56. .    Monitor bowel function. NPO. Tigan. Scopolamine patch. Speech therapy to see for swallow evaluation. Renal:  No acute issues. Hx of stress incontinence. Monitor BUN & Cr, electrolytes & replace as needed. Monitor I & O. [place]. ID: Leukocytosis. Vancomycin. Day 4. Endocrine: Hyperglycemia. Hx of diabetes & hypothyroid nodules. Monitor BS.    ISS.       MSK:  Deconditioned.   ROM. Turn & reposition. PT & OT pending recommendations. Monitor for skin breakdown. Heme: No acute issues. Hx of anemia   Monitor CBC. Bowel regime: Bisacodyl   Pain control/Sedation:  Dilaudid. Benadryl. Tyelnol. DVT prophylaxis: SCD   GI prophylaxis: lansoprazole. Diet. Glucose protocol:  ISS  [place]: Keep in place for critical care monitoring of fluid balance. Ancillary consults:  Medicine. Neurosurgery. Palliative care. Critical care. Patient/Family update: Will update when family here. Code status:   Full code. Disposition: Continue ICU.       Electronically signed by Serafin Sharma RN MSN APRN-NP [facility] NP  CCNS CCRN 8/26/2021 9:36 AM

## 2021-08-26 NOTE — PROGRESS NOTES
Neurosurg progress note  VITALS:  /63   Pulse 68   Temp 98.8 °F (37.1 °C) (Oral)   Resp 12   Ht 5' 4\" (1.626 m)   Wt (!) 332 lb (150.6 kg)   SpO2 98%   BMI 56.99 kg/m²   24HR INTAKE/OUTPUT:    Intake/Output Summary (Last 24 hours) at 8/26/2021 1357  Last data filed at 8/26/2021 1300  Gross per 24 hour   Intake 2640 ml   Output 1740 ml   Net 900 ml     CT HEAD W CONTRAST    Result Date: 8/22/2021  EXAMINATION: CT OF THE HEAD WITH CONTRAST  8/22/2021 10:20 am TECHNIQUE: CT of the head/brain was performed with the administration of intravenous contrast. Multiplanar reformatted images are provided for review. Dose modulation, iterative reconstruction, and/or weight based adjustment of the mA/kV was utilized to reduce the radiation dose to as low as reasonably achievable. COMPARISON: CT head 08/20/2021 HISTORY: ORDERING SYSTEM PROVIDED HISTORY: Brain tumor TECHNOLOGIST PROVIDED HISTORY: Need frameless stereotactic protocol (bravo) for surgery mon am Reason for exam:->Brain tumor What reading provider will be dictating this exam?->CRC FINDINGS: BRAIN/VENTRICLES: No intracranial hemorrhage or hydrocephalus is identified. There is a large predominantly cystic mass within the left temporal lobe containing a peripheral enhancing component located at the lateral aspect. The mass measures 6 cm x 4.6 cm and most likely represents a primary brain glioma. Moderate mass effect upon the left lateral ventricle is identified. Mild midline shift from right to left is appreciated that is not significantly changed. No other enhancing lesions are identified within the brain. ORBITS: The visualized portion of the orbits demonstrate no acute abnormality. SINUSES: The visualized paranasal sinuses and mastoid air cells demonstrate no acute abnormality. SOFT TISSUES/SKULL:  No acute abnormality of the visualized skull or soft tissues.      Redemonstration of a large 6 cm x 4.6 cm predominantly cystic mass with an enhancing peripheral component. A primary brain glioma is suspected. Moderate mass effect upon the left lateral ventricle and mild midline shift from left to right is stable. CT CHEST W CONTRAST    Result Date: 8/22/2021  EXAMINATION: CT OF THE CHEST WITH CONTRAST; CT OF THE ABDOMEN AND PELVIS WITH CONTRAST; CT OF THE CERVICAL SPINE WITHOUT CONTRAST 8/22/2021 10:20 am TECHNIQUE: CT of the chest was performed with the administration of intravenous contrast. Multiplanar reformatted images are provided for review. Dose modulation, iterative reconstruction, and/or weight based adjustment of the mA/kV was utilized to reduce the radiation dose to as low as reasonably achievable.; CT of the abdomen and pelvis was performed with the administration of intravenous contrast. Multiplanar reformatted images are provided for review. Dose modulation, iterative reconstruction, and/or weight based adjustment of the mA/kV was utilized to reduce the radiation dose to as low as reasonably achievable.; CT of the cervical spine was performed without the administration of intravenous contrast. Multiplanar reformatted images are provided for review. Dose modulation, iterative reconstruction, and/or weight based adjustment of the mA/kV was utilized to reduce the radiation dose to as low as reasonably achievable. COMPARISON: None. HISTORY: ORDERING SYSTEM PROVIDED HISTORY: Brain tumor TECHNOLOGIST PROVIDED HISTORY: Reason for exam:->Brain tumor What reading provider will be dictating this exam?->CRC FINDINGS: CT cervical spine. There is no acute fracture or dislocation in the cervical spine. There is diffuse advanced degenerative changes from C2-T1 with osteophytes and multilevel disc bulges. Large anterior osteophytes are also noted. The prevertebral soft tissues are normal.  A well corticated bony fragment is identified at the tip of the dense probably a previous ununited ossifications center versus old avulsion injury.   Note is made of a CONTRAST; CT OF THE CERVICAL SPINE WITHOUT CONTRAST 8/22/2021 10:20 am TECHNIQUE: CT of the chest was performed with the administration of intravenous contrast. Multiplanar reformatted images are provided for review. Dose modulation, iterative reconstruction, and/or weight based adjustment of the mA/kV was utilized to reduce the radiation dose to as low as reasonably achievable.; CT of the abdomen and pelvis was performed with the administration of intravenous contrast. Multiplanar reformatted images are provided for review. Dose modulation, iterative reconstruction, and/or weight based adjustment of the mA/kV was utilized to reduce the radiation dose to as low as reasonably achievable.; CT of the cervical spine was performed without the administration of intravenous contrast. Multiplanar reformatted images are provided for review. Dose modulation, iterative reconstruction, and/or weight based adjustment of the mA/kV was utilized to reduce the radiation dose to as low as reasonably achievable. COMPARISON: None. HISTORY: ORDERING SYSTEM PROVIDED HISTORY: Brain tumor TECHNOLOGIST PROVIDED HISTORY: Reason for exam:->Brain tumor What reading provider will be dictating this exam?->CRC FINDINGS: CT cervical spine. There is no acute fracture or dislocation in the cervical spine. There is diffuse advanced degenerative changes from C2-T1 with osteophytes and multilevel disc bulges. Large anterior osteophytes are also noted. The prevertebral soft tissues are normal.  A well corticated bony fragment is identified at the tip of the dense probably a previous ununited ossifications center versus old avulsion injury. Note is made of a large cystic mass in the left temporal fossa measuring 6 x 4 cm likely necrotic malignancy. Brain abscess can have a similar appearance. No acute displaced fracture. Advanced diffuse degenerative changes with multilevel disc bulges from C2-T1. A large necrotic mass in the left temporal lobe. CT chest. There is cardiomegaly. The great vessels are normal.  Trachea and major bronchi are patent. There is atelectasis in the lung bases. There is mosaic pattern of ground-glass opacities which may be due to air trapping due to small airway disease. Edema or pneumonia are less likely. Degenerative changes are identified in the thoracic spine with bridging osteophytes. 1 1.1 cm right thyroid nodule is present. Consider ultrasonographic surveillance. Impression Cardiomegaly with mild diffuse ground-glass opacities and atelectasis likely due to air trapping with small airway disease. Pneumonia or edema are less likely. CT abdomen pelvis. There is hepatomegaly with liver measuring 18 cm with diffuse fatty infiltration. There is a 3.4 x 3.4 cm infiltrative hypodense lesion in the medial left hepatic lobe which is indeterminate for malignancy. Gallbladder is distended. Spleen appears normal.  1.1 cm low-attenuation lesion is identified in the body of pancreas of unknown etiology. Adrenals and the kidneys are normal.  Degenerative changes are noted in the lumbar spine. Fat containing umbilical hernia is noted. Pelvis. Bladder is contracted with a Arciniega catheter. Colon is collapsed. The appendix is normal. Impression Fatty liver with a 3.4 x 3.4 cm ill-defined indeterminate hypodense lesion in the medial left hepatic lobe. Developing malignancy is a consideration. Consider PET-CT scan. 1.1 cm indeterminate hypodense lesion in the body of pancreas. Surveillance is recommended. CT ABDOMEN PELVIS W IV CONTRAST Additional Contrast? Radiologist Recommendation    Result Date: 8/22/2021  EXAMINATION: CT OF THE CHEST WITH CONTRAST; CT OF THE ABDOMEN AND PELVIS WITH CONTRAST; CT OF THE CERVICAL SPINE WITHOUT CONTRAST 8/22/2021 10:20 am TECHNIQUE: CT of the chest was performed with the administration of intravenous contrast. Multiplanar reformatted images are provided for review.  Dose modulation, iterative reconstruction, and/or weight based adjustment of the mA/kV was utilized to reduce the radiation dose to as low as reasonably achievable.; CT of the abdomen and pelvis was performed with the administration of intravenous contrast. Multiplanar reformatted images are provided for review. Dose modulation, iterative reconstruction, and/or weight based adjustment of the mA/kV was utilized to reduce the radiation dose to as low as reasonably achievable.; CT of the cervical spine was performed without the administration of intravenous contrast. Multiplanar reformatted images are provided for review. Dose modulation, iterative reconstruction, and/or weight based adjustment of the mA/kV was utilized to reduce the radiation dose to as low as reasonably achievable. COMPARISON: None. HISTORY: ORDERING SYSTEM PROVIDED HISTORY: Brain tumor TECHNOLOGIST PROVIDED HISTORY: Reason for exam:->Brain tumor What reading provider will be dictating this exam?->CRC FINDINGS: CT cervical spine. There is no acute fracture or dislocation in the cervical spine. There is diffuse advanced degenerative changes from C2-T1 with osteophytes and multilevel disc bulges. Large anterior osteophytes are also noted. The prevertebral soft tissues are normal.  A well corticated bony fragment is identified at the tip of the dense probably a previous ununited ossifications center versus old avulsion injury. Note is made of a large cystic mass in the left temporal fossa measuring 6 x 4 cm likely necrotic malignancy. Brain abscess can have a similar appearance. No acute displaced fracture. Advanced diffuse degenerative changes with multilevel disc bulges from C2-T1. A large necrotic mass in the left temporal lobe. CT chest. There is cardiomegaly. The great vessels are normal.  Trachea and major bronchi are patent. There is atelectasis in the lung bases.   There is mosaic pattern of ground-glass opacities which may be due to air trapping due to small airway disease. Edema or pneumonia are less likely. Degenerative changes are identified in the thoracic spine with bridging osteophytes. 1 1.1 cm right thyroid nodule is present. Consider ultrasonographic surveillance. Impression Cardiomegaly with mild diffuse ground-glass opacities and atelectasis likely due to air trapping with small airway disease. Pneumonia or edema are less likely. CT abdomen pelvis. There is hepatomegaly with liver measuring 18 cm with diffuse fatty infiltration. There is a 3.4 x 3.4 cm infiltrative hypodense lesion in the medial left hepatic lobe which is indeterminate for malignancy. Gallbladder is distended. Spleen appears normal.  1.1 cm low-attenuation lesion is identified in the body of pancreas of unknown etiology. Adrenals and the kidneys are normal.  Degenerative changes are noted in the lumbar spine. Fat containing umbilical hernia is noted. Pelvis. Bladder is contracted with a Arciniega catheter. Colon is collapsed. The appendix is normal. Impression Fatty liver with a 3.4 x 3.4 cm ill-defined indeterminate hypodense lesion in the medial left hepatic lobe. Developing malignancy is a consideration. Consider PET-CT scan. 1.1 cm indeterminate hypodense lesion in the body of pancreas. Surveillance is recommended.      CBC:   Lab Results   Component Value Date    WBC 10.3 08/26/2021    RBC 4.60 08/26/2021    RBC 4.68 04/26/2017    HGB 12.0 08/26/2021    HCT 40.0 08/26/2021    MCV 87.0 08/26/2021    MCH 26.1 08/26/2021    MCHC 30.0 08/26/2021    RDW 15.1 08/26/2021     08/26/2021    MPV 11.5 08/26/2021     BMP:    Lab Results   Component Value Date     08/26/2021    K 4.4 08/26/2021    K 4.7 08/21/2021     08/26/2021    CO2 26 08/26/2021    BUN 23 08/26/2021    LABALBU 3.5 08/26/2021    CREATININE 0.6 08/26/2021    CALCIUM 9.0 08/26/2021    GFRAA >60 08/26/2021    LABGLOM >60 08/26/2021    GLUCOSE 178 08/26/2021      potassium phosphate IVPB  20 mmol IntraVENous Once    [START ON 8/27/2021] lansoprazole  15 mg Oral QAM AC    levETIRAcetam  750 mg Oral BID    Or    levetiracetam  750 mg IntraVENous BID    metoprolol tartrate  25 mg Oral BID    insulin lispro  0-18 Units Subcutaneous TID WC    insulin lispro  0-9 Units Subcutaneous Nightly    vancomycin  1,750 mg IntraVENous Q12H    scopolamine  1 patch Transdermal Q72H    sodium chloride flush  10 mL IntraVENous Once    sodium chloride flush  5-40 mL IntraVENous 2 times per day    amLODIPine  5 mg Oral Daily    lithium  300 mg Oral 4x Daily    PARoxetine  40 mg Oral QAM    dexamethasone  4 mg IntraVENous Q6H     Opens eyes to voice followed simple commands partial aphasia perrl eomi   Assessment:  Patient Active Problem List   Diagnosis    Morbid obesity ([address]    Brain mass    Brain bleed ([facility] [address]    Essential hypertension    Depression    Leukocytosis    AMS (altered mental status)    Morbid obesity with BMI of 50.0-59.9, adult (HCC)    Hyponatremia    Paroxysmal atrial fibrillation (HCC)    Liver lesion - INCIDENTAL     Plan:Continue current care  Echo Fitzgerald MD M.D.

## 2021-08-27 ENCOUNTER — HOSPITAL ENCOUNTER (OUTPATIENT)
Dept: RADIATION ONCOLOGY | Age: 56
Discharge: HOME | End: 2021-08-27
Payer: MEDICARE

## 2021-08-27 DIAGNOSIS — C71.9 GLIOMA (HCC): Primary | ICD-10-CM

## 2021-08-27 LAB
ALBUMIN SERPL-MCNC: 3.3 G/DL (ref 3.5–5.2)
ALP BLD-CCNC: 22 U/L (ref 35–104)
ALT SERPL-CCNC: 101 U/L (ref 0–32)
ANION GAP SERPL CALCULATED.3IONS-SCNC: 8 MMOL/L (ref 7–16)
AST SERPL-CCNC: 70 U/L (ref 0–31)
BILIRUB SERPL-MCNC: 0.7 MG/DL (ref 0–1.2)
BUN BLDV-MCNC: 18 MG/DL (ref 6–20)
CALCIUM IONIZED: 1.36 MMOL/L (ref 1.15–1.33)
CALCIUM SERPL-MCNC: 9 MG/DL (ref 8.6–10.2)
CHLORIDE BLD-SCNC: 107 MMOL/L (ref 98–107)
CO2: 23 MMOL/L (ref 22–29)
CREAT SERPL-MCNC: 0.5 MG/DL (ref 0.5–1)
GFR AFRICAN AMERICAN: >60
GFR NON-AFRICAN AMERICAN: >60 ML/MIN/1.73
GLUCOSE BLD-MCNC: 176 MG/DL (ref 74–99)
HCT VFR BLD CALC: 38.3 % (ref 34–48)
HEMOGLOBIN: 11.9 G/DL (ref 11.5–15.5)
LV EF: 65 %
LVEF MODALITY: NORMAL
MAGNESIUM: 2.2 MG/DL (ref 1.6–2.6)
MCH RBC QN AUTO: 26.6 PG (ref 26–35)
MCHC RBC AUTO-ENTMCNC: 31.1 % (ref 32–34.5)
MCV RBC AUTO: 85.7 FL (ref 80–99.9)
METER GLUCOSE: 143 MG/DL (ref 74–99)
METER GLUCOSE: 154 MG/DL (ref 74–99)
METER GLUCOSE: 158 MG/DL (ref 74–99)
METER GLUCOSE: 198 MG/DL (ref 74–99)
PDW BLD-RTO: 14.8 FL (ref 11.5–15)
PHOSPHORUS: 2.7 MG/DL (ref 2.5–4.5)
PLATELET # BLD: 195 E9/L (ref 130–450)
PMV BLD AUTO: 11.4 FL (ref 7–12)
POTASSIUM SERPL-SCNC: 4.5 MMOL/L (ref 3.5–5)
RBC # BLD: 4.47 E12/L (ref 3.5–5.5)
SODIUM BLD-SCNC: 138 MMOL/L (ref 132–146)
TOTAL PROTEIN: 6.1 G/DL (ref 6.4–8.3)
WBC # BLD: 10 E9/L (ref 4.5–11.5)

## 2021-08-27 PROCEDURE — 2060000000 HC ICU INTERMEDIATE R&B

## 2021-08-27 PROCEDURE — 6370000000 HC RX 637 (ALT 250 FOR IP): Performed by: CLINICAL NURSE SPECIALIST

## 2021-08-27 PROCEDURE — 6360000002 HC RX W HCPCS: Performed by: NEUROLOGICAL SURGERY

## 2021-08-27 PROCEDURE — 2700000000 HC OXYGEN THERAPY PER DAY

## 2021-08-27 PROCEDURE — 83735 ASSAY OF MAGNESIUM: CPT

## 2021-08-27 PROCEDURE — 82330 ASSAY OF CALCIUM: CPT

## 2021-08-27 PROCEDURE — 6360000002 HC RX W HCPCS: Performed by: INTERNAL MEDICINE

## 2021-08-27 PROCEDURE — 2580000003 HC RX 258: Performed by: NEUROLOGICAL SURGERY

## 2021-08-27 PROCEDURE — 99233 SBSQ HOSP IP/OBS HIGH 50: CPT | Performed by: SURGERY

## 2021-08-27 PROCEDURE — 2580000003 HC RX 258: Performed by: NURSE PRACTITIONER

## 2021-08-27 PROCEDURE — 6360000002 HC RX W HCPCS: Performed by: NURSE PRACTITIONER

## 2021-08-27 PROCEDURE — 6370000000 HC RX 637 (ALT 250 FOR IP): Performed by: NEUROLOGICAL SURGERY

## 2021-08-27 PROCEDURE — 6370000000 HC RX 637 (ALT 250 FOR IP): Performed by: SURGERY

## 2021-08-27 PROCEDURE — 80053 COMPREHEN METABOLIC PANEL: CPT

## 2021-08-27 PROCEDURE — 6360000002 HC RX W HCPCS: Performed by: CLINICAL NURSE SPECIALIST

## 2021-08-27 PROCEDURE — 6370000000 HC RX 637 (ALT 250 FOR IP): Performed by: NURSE PRACTITIONER

## 2021-08-27 PROCEDURE — 93306 TTE W/DOPPLER COMPLETE: CPT

## 2021-08-27 PROCEDURE — 85027 COMPLETE CBC AUTOMATED: CPT

## 2021-08-27 PROCEDURE — APPSS45 APP SPLIT SHARED TIME 31-45 MINUTES: Performed by: CLINICAL NURSE SPECIALIST

## 2021-08-27 PROCEDURE — 6370000000 HC RX 637 (ALT 250 FOR IP)

## 2021-08-27 PROCEDURE — 84100 ASSAY OF PHOSPHORUS: CPT

## 2021-08-27 PROCEDURE — 99999 PR OFFICE/OUTPT VISIT,PROCEDURE ONLY: CPT | Performed by: RADIOLOGY

## 2021-08-27 PROCEDURE — 2580000003 HC RX 258: Performed by: INTERNAL MEDICINE

## 2021-08-27 PROCEDURE — 97530 THERAPEUTIC ACTIVITIES: CPT

## 2021-08-27 PROCEDURE — 82962 GLUCOSE BLOOD TEST: CPT

## 2021-08-27 PROCEDURE — 36415 COLL VENOUS BLD VENIPUNCTURE: CPT

## 2021-08-27 RX ORDER — POLYETHYLENE GLYCOL 3350 17 G/17G
17 POWDER, FOR SOLUTION ORAL DAILY PRN
Status: DISCONTINUED | OUTPATIENT
Start: 2021-08-27 | End: 2021-08-31 | Stop reason: HOSPADM

## 2021-08-27 RX ORDER — BACITRACIN, NEOMYCIN, POLYMYXIN B 400; 3.5; 5 [USP'U]/G; MG/G; [USP'U]/G
OINTMENT TOPICAL 2 TIMES DAILY
Status: DISCONTINUED | OUTPATIENT
Start: 2021-08-27 | End: 2021-08-31 | Stop reason: HOSPADM

## 2021-08-27 RX ORDER — HYDRALAZINE HYDROCHLORIDE 20 MG/ML
10 INJECTION INTRAMUSCULAR; INTRAVENOUS EVERY 6 HOURS PRN
Status: DISCONTINUED | OUTPATIENT
Start: 2021-08-27 | End: 2021-08-31 | Stop reason: HOSPADM

## 2021-08-27 RX ORDER — DIAPER,BRIEF,INFANT-TODD,DISP
EACH MISCELLANEOUS
Status: COMPLETED
Start: 2021-08-27 | End: 2021-08-27

## 2021-08-27 RX ORDER — LABETALOL HYDROCHLORIDE 5 MG/ML
10 INJECTION, SOLUTION INTRAVENOUS EVERY 6 HOURS PRN
Status: DISCONTINUED | OUTPATIENT
Start: 2021-08-27 | End: 2021-08-31 | Stop reason: HOSPADM

## 2021-08-27 RX ADMIN — BACITRACIN ZINC, NEOMYCIN, POLYMYXIN B SULFAT: 5000; 3.5; 4 OINTMENT TOPICAL at 21:47

## 2021-08-27 RX ADMIN — LEVETIRACETAM 750 MG: 500 SOLUTION ORAL at 08:11

## 2021-08-27 RX ADMIN — DEXAMETHASONE SODIUM PHOSPHATE 4 MG: 4 INJECTION, SOLUTION INTRAMUSCULAR; INTRAVENOUS at 08:11

## 2021-08-27 RX ADMIN — LANSOPRAZOLE 15 MG: KIT at 06:49

## 2021-08-27 RX ADMIN — Medication 10 ML: at 20:29

## 2021-08-27 RX ADMIN — VANCOMYCIN HYDROCHLORIDE 1750 MG: 10 INJECTION, POWDER, LYOPHILIZED, FOR SOLUTION INTRAVENOUS at 09:59

## 2021-08-27 RX ADMIN — INSULIN LISPRO 3 UNITS: 100 INJECTION, SOLUTION INTRAVENOUS; SUBCUTANEOUS at 16:15

## 2021-08-27 RX ADMIN — BACITRACIN ZINC, NEOMYCIN, POLYMYXIN B SULFAT: 5000; 3.5; 4 OINTMENT TOPICAL at 13:08

## 2021-08-27 RX ADMIN — DEXAMETHASONE SODIUM PHOSPHATE 4 MG: 4 INJECTION, SOLUTION INTRAMUSCULAR; INTRAVENOUS at 14:39

## 2021-08-27 RX ADMIN — METOPROLOL TARTRATE 25 MG: 25 TABLET, FILM COATED ORAL at 08:11

## 2021-08-27 RX ADMIN — LITHIUM CARBONATE 300 MG: 300 CAPSULE ORAL at 08:11

## 2021-08-27 RX ADMIN — INSULIN LISPRO 2 UNITS: 100 INJECTION, SOLUTION INTRAVENOUS; SUBCUTANEOUS at 20:35

## 2021-08-27 RX ADMIN — PAROXETINE 40 MG: 20 TABLET, FILM COATED ORAL at 08:11

## 2021-08-27 RX ADMIN — LITHIUM CARBONATE 300 MG: 300 CAPSULE ORAL at 16:15

## 2021-08-27 RX ADMIN — Medication 10 ML: at 08:12

## 2021-08-27 RX ADMIN — HYDRALAZINE HYDROCHLORIDE 10 MG: 20 INJECTION, SOLUTION INTRAMUSCULAR; INTRAVENOUS at 00:15

## 2021-08-27 RX ADMIN — LEVETIRACETAM 750 MG: 500 SOLUTION ORAL at 20:26

## 2021-08-27 RX ADMIN — INSULIN LISPRO 3 UNITS: 100 INJECTION, SOLUTION INTRAVENOUS; SUBCUTANEOUS at 12:04

## 2021-08-27 RX ADMIN — DEXAMETHASONE SODIUM PHOSPHATE 4 MG: 4 INJECTION, SOLUTION INTRAMUSCULAR; INTRAVENOUS at 02:39

## 2021-08-27 RX ADMIN — METOPROLOL TARTRATE 25 MG: 25 TABLET, FILM COATED ORAL at 20:26

## 2021-08-27 RX ADMIN — AMLODIPINE BESYLATE 5 MG: 5 TABLET ORAL at 08:11

## 2021-08-27 RX ADMIN — INSULIN LISPRO 3 UNITS: 100 INJECTION, SOLUTION INTRAVENOUS; SUBCUTANEOUS at 08:12

## 2021-08-27 RX ADMIN — DEXAMETHASONE SODIUM PHOSPHATE 4 MG: 4 INJECTION, SOLUTION INTRAMUSCULAR; INTRAVENOUS at 20:26

## 2021-08-27 RX ADMIN — LITHIUM CARBONATE 300 MG: 300 CAPSULE ORAL at 12:04

## 2021-08-27 RX ADMIN — HYDRALAZINE HYDROCHLORIDE 10 MG: 20 INJECTION INTRAMUSCULAR; INTRAVENOUS at 23:29

## 2021-08-27 RX ADMIN — BACITRACIN ZINC: 500 OINTMENT TOPICAL at 12:04

## 2021-08-27 RX ADMIN — LITHIUM CARBONATE 300 MG: 300 CAPSULE ORAL at 23:29

## 2021-08-27 ASSESSMENT — PAIN SCALES - GENERAL
PAINLEVEL_OUTOF10: 0

## 2021-08-27 NOTE — PROGRESS NOTES
Pharmacy Consultation Note  (Antibiotic Dosing and Monitoring)    Initial consult date: 8/23/21  Consulting provider: Oanh MACKEY  Drug: Vancomycin  Indication: Surgical Prophylaxis - ICP monitor in place    Age/  Gender Height Weight IBW  Allergy Information   56 y.o./female 5' 4\" (162.6 cm) (per 8/20 docuementation)162.6 cm (!) 332 lb (150.6 kg) (per 8/20 documentation)  150.6 kg   Ideal body weight: 54.7 kg (120 lb 9.5 oz)  Adjusted ideal body weight: 93.1 kg (205 lb 2.5 oz)   Clindamycin/lincomycin, Iodine, Penicillins, and Sulfa antibiotics      Renal Function:  Recent Labs     08/25/21  0510 08/26/21  0415 08/27/21  0456   BUN 23* 23* 18   CREATININE 0.6 0.6 0.5       Intake/Output Summary (Last 24 hours) at 8/27/2021 1054  Last data filed at 8/27/2021 1000  Gross per 24 hour   Intake 2620 ml   Output 3530 ml   Net -910 ml       Vancomycin Monitoring:  Trough:    Recent Labs     08/24/21  2140   [address] 13.3     Random:  No results for input(s): VANCORANDOM in the last 72 hours. Vancomycin Administration Times:  Recent vancomycin administrations                   vancomycin (VANCOCIN) 1,750 mg in sodium chloride 0.9 % 500 mL IVPB (mg) 1,750 mg New Bag 08/27/21 0959     1,750 mg New Bag 08/26/21 2204     1,750 mg New Bag  1035     1,750 mg New Bag 08/25/21 2200     1,750 mg New Bag  1013     1,750 mg New Bag 08/24/21 2244     1,750 mg New Bag  1100                  Assessment:  · Patient is a 60 y.o. female who has been initiated on vancomycin  Estimated Creatinine Clearance: 180 mL/min (based on SCr of 0.5 mg/dL). · Vancomycin trough 13.3 mcg/mL on 8/24; Goal trough = 10 - 20 mcg/ml;  Goal AUC/EZRA 400-600  · Received vancomycin 2000 mg IV x 1 pre-operatively     Plan:  · Continue Vancomycin 1750 mg IV every 12 hours  · Will check an additional vancomycin trough level with the 10:30pm dose tonight and will adjust as needed  · Will continue to monitor renal function   · Clinical pharmacy to follow    David Figueredo PharmD, BCPS, BCCCP  8/27/2021  10:56 AM        Addendum:    Vancomycin was discontinued today. Therefore, will sign off at this time. Please re-consult if needed.     David Figueredo PharmD, BCPS, BCCCP  8/27/2021  12:00 PM

## 2021-08-27 NOTE — PLAN OF CARE
Problem: Injury - Risk of, Healthcare-Acquired Condition:  Goal: Will remain free from falls  Description: Will remain free from falls  8/27/2021 0126 by Ben Da Silva RN  Outcome: Met This Shift  8/26/2021 1605 by Richard Pearl RN  Outcome: Met This Shift     Problem: Injury - Risk of, Healthcare-Acquired Condition:  Goal: Absence of pressure ulcer  Description: Absence of pressure ulcer  8/27/2021 0126 by Ben Da Silva RN  Outcome: Met This Shift  8/26/2021 1605 by Richard Pearl RN  Outcome: Met This Shift     Problem: Swallowing - Impaired:  Goal: Able to swallow without choking  Description: Able to swallow without choking  Outcome: Met This Shift     Problem: Respiratory Function - Compromised:  Goal: Absence of pulmonary infection  Description: Absence of pulmonary infection  Outcome: Met This Shift     Problem: Verbal Communication - Impaired:  Goal: Functional communication will improve  Description: Functional communication will improve  8/27/2021 0126 by Ben Da Silva RN  Outcome: Ongoing  8/26/2021 1605 by Richard Pearl RN  Outcome: Met This Shift     Problem: Pain:  Goal: Pain level will decrease  Description: Pain level will decrease  8/27/2021 0126 by Ben Da Silva RN  Outcome: Ongoing  8/26/2021 1605 by Richard Pearl RN  Outcome: Met This Shift     Problem: Pain:  Goal: Recognizes and communicates pain  Description: Recognizes and communicates pain  8/27/2021 0126 by Ben Da Silva RN  Outcome: Ongoing  8/26/2021 1605 by Richard Pearl RN  Outcome: Met This Shift     Problem: Mobility - Impaired:  Goal: Able to verbalize acceptance of life and situations over which he or she has no control  Description: Absence of contracture deformity  8/27/2021 0126 by Ben Da Silva RN  Outcome: Ongoing  8/26/2021 1605 by Richard Pearl RN  Outcome: Not Met This Shift     Problem:  Bowel Function - Altered:  Goal: Control of bowel function will improve  Description: Control of bowel function will improve  Outcome: Ongoing     Problem: Skin Integrity:  Goal: Will show no infection signs and symptoms  Description: Will show no infection signs and symptoms  Outcome: Ongoing     Problem: Neurological  Goal: Maximum potential motor/sensory/cognitive function  Outcome: Ongoing

## 2021-08-27 NOTE — PROGRESS NOTES
Palliative Medicine   Progression of Care     Patient's chart reviewed. Spoke with patient and spouse at the bedside. Patient able to get some simple questions correct and others incorrect. Per Neurosurgery: Preliminary pathology consistent with GBM. Radiation Oncology is consulted.      Palliative Care following closely for support of patient and family   Angela SWANSON-CNP, AGAP-BC

## 2021-08-27 NOTE — PROGRESS NOTES
Physical Therapy  Physical Therapy Treatment Note    Name: Godfrey Ortiz  : 1965  MRN: [id number]      Date of Service: 2021    Evaluating PT:  Alistair Boy, PT, DPT [id number]    Room #:  7257/8945-P  Diagnosis:  Brain mass [G93.89]  PMHx/PSHx:  Asthma, Bipolar, DM, HTN  Procedure/Surgery:   L temporoparietal craniotomy   Precautions:  Falls, ICP monitor, aphasia, R hemiparesis, SBP < 140  Equipment Needs:  TBD    SUBJECTIVE:    Pt lives with  in a 1 story home with 1 stairs to enter, per chart. Pt ambulated without device and was independent PTA. OBJECTIVE:   Initial Evaluation  Date: 21 Treatment  21 Short Term/ Long Term   Goals   AM-PAC 6 Clicks 56     Was pt agreeable to Eval/treatment? Yes Yes    Does pt have pain?  No signs of pain No signs of pain    Bed Mobility  Rolling: NT  Supine to sit: Dep x 2 with HOB elevated  Sit to supine: Dep x 2  Scooting: Dep Rolling: NT  Supine to sit: MaxA x 2 with HOB elevated  Sit to supine: Dep x 2  Scooting: Dep ModA   Transfers Sit to stand: NT  Stand to sit: NT  Stand pivot: NT Sit to stand: MaxA x 2  Stand to sit: MaxA x 2  Stand pivot: NT ModA with AAD   Ambulation   NT NT >25 feet with ModA with AAD   Stair negotiation: ascended and descended NT  >2 steps with 1 rail ModA   ROM BUE:  Defer to OT note  BLE:  WFL     Strength BUE:  Defer to OT note  LLE:  3+ to 4-/5 grossly  RLE:  3 to 3+/5 grossly  Increase by 1/3 MMT grade   Balance Sitting EOB:  MaxA to [address] [facility] [place]  Dynamic Standing:  NT Sitting EOB:  ModA  Dynamic Standing:  MaxA x 2 no device Sitting EOB:  Independent  Dynamic Standing:  ModA with AAD     Pt is A & O x 1  CAM-ICU: NT  RASS: 0  Sensation:  Unable to formally assess  Edema:  None    Vitals:  Heart Rate at rest 67 bpm Heart Rate post session 68 bpm   SpO2 at rest 97% SpO2 post session 98%   Blood Pressure at rest 146/60 mmHg Blood Pressure post session 139/61 mmHg     Functional Status Score-Intensive Care Unit (FSS-ICU)   Rolling -/7   Supine to sit transfer 1/7   Unsupported sitting  2/7   Sit to stand transfers 2/7   Ambulation -/7   Total  5/35     Therapeutic Exercises:  BLE AROM x 10 reps in all planes    Patient education  Pt educated on safety    Patient response to education:   Pt verbalized understanding Pt demonstrated skill Pt requires further education in this area   no partial yes     ASSESSMENT:    Conditions Requiring Skilled Therapeutic Intervention:    [x]Decreased strength     []Decreased ROM  [x]Decreased functional mobility  [x]Decreased balance   [x]Decreased endurance   [x]Decreased posture  []Decreased sensation  []Decreased coordination   []Decreased vision  [x]Decreased safety awareness   []Increased pain       Comments:  NP reported pt was stable for session. Pt was in bed upon arrival, agreeable to treatment session. Pt was lethargic initially but became more alert with activity. Pt was able to initiate movement for bed mobility. R posterior lean in sitting. Pt stood with flexed posture and was unable to achieve a fully erect posture. Fatigue limited activity. Pt was left in bed with all needs met and call light in reach. All lines remained intact. Treatment:  Patient practiced and was instructed in the following treatment:     Bed mobility training - pt given verbal and tactile cues to facilitate proper sequencing and safety during supine>sit as well as provided with physical assistance.  Sitting EOB for >10 minutes for upright tolerance, postural awareness and BLE ROM   Transfer training - pt was given verbal and tactile cues to facilitate proper hand placement, technique and safety during sit to stand and stand to sit as well as provided with physical assistance to complete task.  Skilled positioning - Pt placed in the chair position with pillows utilized to facilitate upright posture, joint and skin integrity, and interaction with environment.       Non-pharmacological treatment and prevention of ICU delirium - Pt oriented to date, time, time of day, place, and situation as well as provided with visual and auditory stimuli in order to improve cognition and combat effects of ICU delirium. PLAN:    Patient is making some progress towards established goals. Will continue with current POC.       Time in  1320  Time out  1345    Total Treatment Time  25 minutes     CPT codes:  [] Gait training 88826 - minutes  [] Manual therapy 64431 - minutes  [x] Therapeutic activities 94038 25 minutes  [] Therapeutic exercises 45757 - minutes  [] Neuromuscular reeducation 62356 - minutes    Jaida Burgess, PT, DPT  [id number]

## 2021-08-27 NOTE — PROGRESS NOTES
Chief Complaint:  Glioblastoma ([address]     Subjective:    Aphasia relatively stable, overall quite a lot improved compared to a few days ago    Objective:    BP (!) 150/70   Pulse 62   Temp 98.2 °F (36.8 °C) (Temporal)   Resp 18   Ht 5' 4\" (1.626 m)   Wt (!) 332 lb (150.6 kg)   SpO2 92%   BMI 56.99 kg/m²     Current medications that patient is taking have been reviewed.     General appearance: Nontoxic, morbidly obese  HEENT: dressing on head, MMM  Neck: FROM, supple  Lungs: Clear to auscultation anteriorly, WOB normal  CV: RRR, no MRGs  Abdomen: Soft, non-tender; no masses or HSM, +BS  Extremities: No peripheral edema or digital cyanosis  Skin: no rash, lesions or ulcers  Psych: Calm and cooperative  Neuro: Alert and interactive, face symmetric, moderately aphasic    Labs:  CBC with Differential:    Lab Results   Component Value Date    WBC 10.0 08/27/2021    RBC 4.47 08/27/2021    RBC 4.68 04/26/2017    HGB 11.9 08/27/2021    HCT 38.3 08/27/2021     08/27/2021    MCV 85.7 08/27/2021    MCH 26.6 08/27/2021    MCHC 31.1 08/27/2021    RDW 14.8 08/27/2021    LYMPHOPCT 7.8 08/20/2021    MONOPCT 1.9 08/20/2021    BASOPCT 0.2 08/20/2021    MONOSABS 0.26 08/20/2021    LYMPHSABS 1.06 08/20/2021    EOSABS 0.00 08/20/2021    BASOSABS 0.03 08/20/2021     CMP:    Lab Results   Component Value Date     08/27/2021    K 4.5 08/27/2021    K 4.7 08/21/2021     08/27/2021    CO2 23 08/27/2021    BUN 18 08/27/2021    CREATININE 0.5 08/27/2021    GFRAA >60 08/27/2021    LABGLOM >60 08/27/2021    GLUCOSE 176 08/27/2021    PROT 6.1 08/27/2021    LABALBU 3.3 08/27/2021    CALCIUM 9.0 08/27/2021    BILITOT 0.7 08/27/2021    ALKPHOS 22 08/27/2021    AST 70 08/27/2021     08/27/2021            Assessment/Plan:  Principal Problem:    Glioblastoma ([facility] [address]  Active Problems:    Brain mass    Brain bleed (HCC)    Essential hypertension    Depression    Leukocytosis    AMS (altered mental status)    Morbid obesity with BMI of 50.0-59.9, adult (HCC)    Hyponatremia    Paroxysmal atrial fibrillation (HCC)    Liver lesion - INCIDENTAL  Resolved Problems:    * No resolved hospital problems.  *       Unfortunately path shows GBM    Rad onc and heme/onc consulted    Continue metoprolol for PAF    BP well controlled    Requires continued inpatient level of care   Lety Ceron MD    6:20 PM   8/27/2021

## 2021-08-27 NOTE — PLAN OF CARE
Problem: Discharge Planning:  Goal: Discharged to appropriate level of care  Description: Discharged to appropriate level of care  8/27/2021 1035 by Cydney Milner RN  Outcome: Met This Shift     Problem: Verbal Communication - Impaired:  Goal: Functional communication will improve  Description: Functional communication will improve  8/27/2021 1035 by Cydney Milner RN  Outcome: Met This Shift     Problem: Verbal Communication - Impaired:  Goal: Ability to interact with others will improve  Description: Ability to interact with others will improve  8/27/2021 1035 by Cydney Milner RN  Outcome: Met This Shift     Problem: Verbal Communication - Impaired:  Goal: Ability to establish a method of communication will improve  Description: Ability to establish a method of communication will improve  8/27/2021 1035 by Cydney Milner RN  Outcome: Not Met This Shift     Problem: Pain:  Goal: Pain level will decrease  Description: Pain level will decrease  8/27/2021 1035 by Cydney Milner RN  Outcome: Met This Shift     Problem: Pain:  Goal: Recognizes and communicates pain  Description: Recognizes and communicates pain  8/27/2021 1035 by Cydney Milner RN  Outcome: Met This Shift     Problem: Mobility - Impaired:  Goal: Able to ambulate independently  Description: Able to ambulate independently  8/27/2021 1035 by Cydney Milner RN  Outcome: Not Met This Shift     Problem: Mobility - Impaired:  Goal: Able to ambulate with minimal assistance  Description: Able to ambulate with minimal assistance  8/27/2021 1035 by Cydney Milner RN  Outcome: Not Met This Shift     Problem: Mobility - Impaired:  Goal: Ability to appropriately use an adaptive device for ambulation will improve  Description: Ability to appropriately use an adaptive device for ambulation will improve  8/27/2021 1035 by Cydney Milner RN  Outcome: Not Met This Shift     Problem: Mobility - Impaired:  Goal: Able to verbalize acceptance of life and situations over which he or she has no control  Description: Absence of contracture deformity  8/27/2021 1035 by Cassia Garner RN  Outcome: Not Met This Shift     Problem: Autonomic Dysreflexia - Risk of:  Goal: Absence of autonomic dysreflexia signs and symptoms  Description: Absence of autonomic dysreflexia signs and symptoms  8/27/2021 1035 by Cassia Garner RN  Outcome: Met This Shift     Problem: Injury - Risk of, Healthcare-Acquired Condition:  Goal: Absence of healthcare acquired conditions  Description: Absence of healthcare acquired conditions  8/27/2021 1035 by Cassia Garner RN  Outcome: Met This Shift     Problem: Injury - Risk of, Healthcare-Acquired Condition:  Goal: Will remain free from falls  Description: Will remain free from falls  8/27/2021 1035 by Cassia Garner RN  Outcome: Met This Shift     Problem: Injury - Risk of, Healthcare-Acquired Condition:  Goal: Absence of pressure ulcer  Description: Absence of pressure ulcer  8/27/2021 1035 by Cassia Garner RN  Outcome: Met This Shift     Problem: Injury - Risk of, Healthcare-Acquired Condition:  Goal: Demonstration of wound healing without infection will improve  Description: Demonstration of wound healing without infection will improve  8/27/2021 1035 by Cassia Garner RN  Outcome: Met This Shift     Problem: Injury - Risk of, Healthcare-Acquired Condition:  Goal: Absence of urinary tract infection signs and symptoms  Description: Absence of urinary tract infection signs and symptoms  8/27/2021 1035 by Cassia Garner RN  Outcome: Met This Shift     Problem: Nutrition Deficit - Risk of:  Goal: Ability to achieve adequate nutritional intake will improve  Description: Ability to achieve adequate nutritional intake will improve  8/27/2021 1035 by Cassia Garner RN  Outcome: Met This Shift     Problem: Nutrition Deficit - Risk of:  Goal: Maintenance of adequate weight for body size and type will improve to within specified parameters  Description: Maintenance of adequate weight for body size and type will improve to within specified parameters  8/27/2021 1035 by Delmer Cronin RN  Outcome: Not Met This Shift     Problem: Swallowing - Impaired:  Goal: Able to swallow without choking  Description: Able to swallow without choking  8/27/2021 1035 by Delmer Cronin RN  Outcome: Met This Shift     Problem: Swallowing - Impaired:  Goal: Absence of aspiration  Description: Absence of aspiration  8/27/2021 1035 by Delmer Cronin RN  Outcome: Met This Shift     Problem: Aspiration - Risk of:  Goal: Absence of aspiration  Description: Absence of aspiration  8/27/2021 1035 by Delmer Cronin RN  Outcome: Met This Shift     Problem: Respiratory Function - Compromised:  Goal: Absence of pulmonary infection  Description: Absence of pulmonary infection  8/27/2021 1035 by Delmer Cronin RN  Outcome: Met This Shift     Problem: Respiratory Function - Compromised:  Goal: Levels of oxygenation will improve  Description: Levels of oxygenation will improve  8/27/2021 1035 by Delmer Cronin RN  Outcome: Met This Shift     Problem: Respiratory Function - Compromised:  Goal: Ability to maintain a clear airway will improve  Description: Ability to maintain a clear airway will improve  8/27/2021 1035 by Delmer Cronin RN  Outcome: Met This Shift     Problem: Bowel Function - Altered:  Goal: Bowel elimination is within specified parameters  Description: Bowel elimination is within specified parameters  8/27/2021 1035 by Delmer Cronin RN  Outcome: Met This Shift     Problem: Bowel Function - Altered:  Goal: Control of bowel function will improve  Description: Control of bowel function will improve  8/27/2021 1035 by Delmer Cronin RN  Outcome: Met This Shift     Problem:  Bowel Function - Altered:  Goal: Ability to maintain normal bowel function will improve  Description: Ability to maintain normal bowel function will improve  8/27/2021 1035 by Delmer Cronin RN  Outcome: Met This Shift     Problem: Urinary Elimination - Impaired:  Goal: Urinary elimination within specified parameters  Description: Urinary elimination within specified parameters  8/27/2021 1035 by Delmer Cronin RN  Outcome: Met This Shift     Problem: Urinary Elimination - Impaired:  Goal: Absence of postvoid residual urine  Description: Absence of postvoid residual urine  8/27/2021 1035 by Delmer Cronin RN  Outcome: Met This Shift     Problem: Urinary Elimination - Impaired:  Goal: Absence of incontinence - Urinary  Description: Absence of incontinence - Urinary  8/27/2021 1035 by Delmer Cronin RN  Outcome: Met This Shift     Problem: Urinary Elimination - Impaired:  Goal: Reports of episodes of incontinence will decrease - Urinary  Description: Reports of episodes of incontinence will decrease - Urinary  8/27/2021 1035 by Delmer Cronin RN  Outcome: Met This Shift     Problem: Self-Care Deficit:  Goal: Ability to perform activities of daily living will improve  Description: Ability to perform activities of daily living will improve  8/27/2021 1035 by Delmer Cronin RN  Outcome: Met This Shift     Problem: Self-Care Deficit:  Goal: Able to perform ADL with assistance  Description: Able to perform ADL with assistance  8/27/2021 1035 by Delmer Cronin RN  Outcome: Met This Shift     Problem: Self-Care Deficit:  Goal: Ability to communicate needs accurately will improve - ADL needs  Description: Ability to communicate needs accurately will improve - ADL needs  8/27/2021 1035 by Delmer Cronin RN  Outcome: Met This Shift     Problem: Self-Care Deficit:  Goal: Able to use self-care assistive device appropriately  Description: Able to use self-care assistive device appropriately  8/27/2021 1035 by Delmer Cronin RN  Outcome: Met This Shift     Problem: Sexual Dysfunction:  Goal: Expressions of positive opinion of body image will increase  Description: Expressions of positive opinion of body image will increase  8/27/2021 1035 by Supriya Mart RN  Outcome: Met This Shift     Problem: Psychosocial Distress:  Goal: Absence of psychosocial distress  Description: Absence of psychosocial distress  8/27/2021 1035 by Supriya Mart RN  Outcome: Met This Shift     Problem: Psychosocial Distress:  Goal: Ability to cope will improve  Description: Ability to cope will improve  8/27/2021 1035 by Supriya Mart RN  Outcome: Met This Shift     Problem: Psychosocial Distress:  Goal: Ability to identify and utilize available support systems will improve  Description: Ability to identify and utilize available support systems will improve  8/27/2021 1035 by Supriya Mart RN  Outcome: Met This Shift     Problem: Disruptive Behavior:  Goal: Knowledge of developmental care interventions  Description: Absence of violence  8/27/2021 1035 by Supriya Mart RN  Outcome: Met This Shift     Problem: Disruptive Behavior:  Goal: Decrease in feelings of agitation  Description: Decrease in feelings of agitation  8/27/2021 1035 by Supriya Mart RN  Outcome: Met This Shift     Problem: Skin Integrity:  Goal: Will show no infection signs and symptoms  Description: Will show no infection signs and symptoms  8/27/2021 1035 by Supriya Mart RN  Outcome: Met This Shift     Problem: Skin Integrity:  Goal: Absence of new skin breakdown  Description: Absence of new skin breakdown  8/27/2021 1035 by Supriya Mart RN  Outcome: Met This Shift     Problem: Falls - Risk of:  Goal: Will remain free from falls  Description: Will remain free from falls  8/27/2021 1035 by Supriya Mart RN  Outcome: Met This Shift     Problem: Falls - Risk of:  Goal: Absence of physical injury  Description: Absence of physical injury  8/27/2021 1035 by Supriya Mart RN  Outcome: Met This Shift     Problem: Pain:  Goal: Pain level will decrease  Description: Pain level will decrease  8/27/2021 1035 by Supriya Mart RN  Outcome: Met This Shift     Problem: Pain:  Goal: Control of acute pain  Description: Control of acute pain  8/27/2021 1035 by Kim Borja RN  Outcome: Met This Shift     Problem: Pain:  Goal: Control of chronic pain  Description: Control of chronic pain  8/27/2021 1035 by Kim Borja RN  Outcome: Met This Shift     Problem: Neurological  Goal: Maximum potential motor/sensory/cognitive function  8/27/2021 1035 by Kim Borja RN  Outcome: Not Met This Shift

## 2021-08-27 NOTE — PROGRESS NOTES
OCCUPATIONAL THERAPY TREATMENT NOTE    [address] [address] [address]       Date:2021                                                               Patient Name: Jak Ryees  MRN: [id number]  : 1965  Room: [address] [place] [address]    Evaluating OT: Cary Flynn OTR/L [id number]     Referring Provider: SKY Li CNS   Specific Provider Orders/Date: OT eval and treat (21)        Diagnosis: Brain mass       Reason for admission: AMS & emesis; expressive aphasia     Surgery/Procedures:  L temporoparietal craniotomy       Pertinent Medical History: Asthma, Bipolar, DM, HTN, Thyroid disease         *Precautions:  Fall Risk, bed alarm, , aphasia, R hemiparesis and inattention, SBP<140, puree/honey thick dysphagia diet per chart     Assessment of current deficits   [x]? Functional mobility          [x]? ADLs           [x]? Strength                  [x]? Cognition   [x]? Functional transfers        [x]? IADLs         [x]? Safety Awareness   [x]? Endurance   [x]? Fine Coordination           [x]? ROM           [x]? Vision/perception    [x]? Sensation     [x]? Gross Motor Coordination [x]? Balance    [x]? Delirium                  [x]? Motor Control     [x]?  Communication     OT PLAN OF CARE   OT POC based on physician orders, patient diagnosis and results of clinical assessment.        Frequency/Duration: 1-3 days/wk for 1-2 weeks PRN    Specific OT Treatment to include:   ADL retraining/adapted techniques and AE recommendations to increase functional independence within precautions                    Energy conservation techniques to improve tolerance for selfcare routine   Functional transfer/mobility training/DME recommendations for increased independence, safety and fall prevention         Patient/family education to increase safety and functional independence within precautions              Environmental modifications for safe mobility and completion of ADLs                           Cognitive retraining ex's to improve problem solving skills & safe participation in ADLs/IADLs  Sensory re-education techniques to improve extremity awareness, maintain skin integrity and improve hand function                             Visual/Perceptual retraining  to improve body awareness and safety during transfers and ADLs  Splinting/positioning needs to maintain joint/skin integrity and prevent contracture  Therapeutic activity to improve functional performance during ADLs/IADLs                                         Therapeutic exercise to improve tolerance and functional strength for ADLs   Balance retraining exercises/tasks for facilitation of postural control with dynamic challenges during ADLs . Positioning to improve functional independence  Neuromuscular re-education: facilitation of righting/equilibrium reactions, normalization muscle tone/facilitation active functional movement                      Delirium prevention/treatment     Modified Anisa Scale   Score     Description  0             No symptoms  1             No significant disability despite symptoms  2             Slight disability; able to look after own affairs  3             Moderate disability; able to ambulate without assist/ requires assist with ADLs  4             Moderate/Severe disability;requires assist to ambulate/assist with ADLs  5             Severe disability;bedridden/incontinent   6               Score:   5     Recommended Adaptive Equipment: TBA      Home Living: Per chart, pt lives with   in a 1 floor plan with 1 step(s) to enter. Pt cannot report due to aphasia. Bathroom setup: unknown  Equipment owned: unknown     Prior Level of Function: IND with ADLs;  IND with IADLs.    No device for ambulation.      Pain Level: pt c/o 0/10 pain  this session; no indication of pain during session.     Cognition: A&O: 1/4  (Pt able to state her name, husbands name when asked) Pt not able to provide answers to orientation questions when given choices. Follows gross 1 step commands ~50% of the time with cues. Pt limited due to decreased concentration, body awareness and perceptual deficits. Memory: difficult to assess             Comprehension fair- simple instruction. Pt demo increased R sided inattention today during UB ADLS. Problem solving: poor+             Judgement/safety: poor                Communication skills: poor+ due to aphasia. Pt with occasional clear responses. Words mostly incomprehensible. Vision: impaired tracking to R upper field; finger ID grossly appropriate. Glasses:not present in room                                                       Hearing: grossly WFL               RASS: 0  CAM-ICU: (NT) Delirium- unable to assess due to aphasia     UE Assessment:  Hand Dominance: Right []? Left []? Pt not able to report       ROM Strength STM goal: PRN   RUE  PROM WFL; gross shoulder flexion/elbow flexion and weak grasp observed. Pt does not comprehend ROM testing. 2-/5 shoulder  3-/5 elbow  3-/5 hand     *impaired [address] [place] skills    8/27- noted decreased purposeful movement R UE this date; impaired attention WFL; 3+/5      LUE PROM WFL     A. AROM: grossly WFL with impaired [address] [place] 3-/5 proximal  3+/5 distal WFL for ADLS; 4-/5         Sensation:unable to assess  Tone: WFL  Edema: min to moderate edema R UE (greater in hand)     Functional Assessment:  AM-PAC Daily Activity Raw Score: 7/24    Initial Eval Status  Date: 8/26 Treatment Status  Date:8/27 STGs = LTGs  Time frame: 7-14 days   Feeding NT NT                        Min A  while seated up in chair to increase activity tolerance; AE as needed to complete tasks         Grooming Max A  with [place] assist to wash face; hands- pt required max cues to initiate and end tasks; encouraged use of R UE during tasks with fair- success.  Max A  To wash hands; decreased B UE integration noted today; [place] assist to utilize R UE during ADLs. Min A   while seated supported; using R UE as fair functional assist & demonstrating G body awareness.      UB dressing/bathing Dep Max A    for sponge bathing; pt demo decreased R hand grasp today; unable to hold onto wash cloth                        Mod A  demo fair awareness of miguel techniques.         LB dressing/bathing Dep Dep                        Max A   using AE as needed for safe reach/ energy conservation        Toileting Dep                               Bed Mobility  Supine to sit: Dep+2     Sit to supine: Dep+2 Supine to sit: Max A+2     Sit to supine: Dep+2                       Mod A  in prep of ADL tasks & transfers   Functional Transfers Sit to stand: NT     Stand to sit: NT Sit <> stand: Max A+2   (higher bed surface)                       Max A  sit<>stand/functional bathroom transfers using AD/DME as needed for balance and safety   Functional Mobility NT NT                       Max A   functional/bathroom mobility using AD as needed & demonstrating F safety      Balance Sitting:     Static:  Max A improving to Mod A    Dynamic:Max A  Standing: NT Sitting: Min A static  Mod A dynamic    Standing: Max A+2 with cues for [employer] A dynamic sitting balance; Max A dynamic standing balance  during ADL tasks & transfers   Endurance/Activity Tolerance    F tolerance with light activity. Tolerated sitting EOB >8 min with activity. Fair tolerance with moderate activity  F   tolerance with moderate activity/self care routine   Visual/  Perceptual Impaired: visual tracking to midline; L gaze with R sided inattention.  R/L discrimination and R sided body awareness.       Increased cues to regard R UE this date; pt more lethargic this pm                Vitals:   HR at rest: 67 bpm HR at end of session: 68 bpm   Spo2 at rest:97% Spo2 at end of session: 98%   BP at rest: 146/60 mmHg BP at end of session: 139/61 mmHg       Treatment: OT treatment provided this date   Bed mobility: Instruction on precautions prior to bed mobility to facilitate safe transfers and ADLS. Pt required 2 person assist for safe mobility due to complexity of medical condition, medical lines and deconditioning. Balance retraining: Performed sitting/standing balance ex's with instruction to facilitate righting reactions with postural changes during ADLS. Pt required assist of 2 for safety. ROM/exercise: A/AROM ex's to improve R UE ROM and strength; pt demo decreased endurance today with decreased strength. Cognition/Perception:Cognitive & perceptual retraining ex's throughout session to improve attention, body awareness and problem solving skills for participation in light ADLs. Delirium Prevention: Environmental and sensory modifications assessed and implemented to decrease ICU acquired delirium and to improve overall orientation, mentation and pt interaction with family/staff. Line management and environmental modifications made prior to and end of session to ensure patient safety and to increase efficiency of session. Skilled monitoring of HR, O2 saturation, blood pressure and patient's response to activity performed throughout session. Comments: OK from RN to see patient. Upon arrival, patient supine in bed, more lethargic today. Pt demo fair tolerance with fair- understanding of education/techniques. At end of session, patient returned to bed with bed placed in chair position to increase endurance for upright activity and to increase interaction with staff. Pillows placed under B UE/LE for edema control and comforty. Call light within reach, all lines and tubes intact. Pt instructed on use of call light for assistance and fall prevention. Overall, pt presents with decreased activity tolerance, dynamic balance, functional mobility limiting completion of ADLs and safe return home.  Pt can benefit from continued skilled OT to increase safety, functional independence & quality of life. · Pt has made fair progress towards set goals.    · Continue with current plan of care       Time In:1330              Time Out: 1355         Total Treatment Time: 25      Treatment Charges: Mins Units   Ther Ex  [id number]     Manual Therapy Batsheva Corey [id number]   ADL/Home Mgt [id number]     Neuro Re-ed [id number]     Orthotic manage/training  [id number]     Non-Billable Time         Donelda Meckel, OTR/L [id number]

## 2021-08-27 NOTE — PROGRESS NOTES
Coordination of care discussion and chart review with PM team.LSW met at bedside with pt and her . supportive listening provided as her  expressed his concerns about planning for rehab, pending biopsy results, and trying to offer her sisters a voice in what the plans are. Pt is alert, and does says endy and thank you appropriately during visit.  will remain available for on-going psychosocial support, as needed.

## 2021-08-27 NOTE — PROGRESS NOTES
Intensive Care Unit  Critical Care Consult  Daily Progress Note 8/27/2021    Date of Admission: 8/21    EVENTS:   08/20  Presented to [facility] ED for AMS & vomiting. Expressive aphasia. Has had progressive neurological changes and headaches in past months. Symptoms worsened. H CT showed left temporoparietal cystic mass with surrounding edema and midline shift of 5 mm. Transferred to [address] [place] [address] for ongoing care. 08/21  Admitted to ICU. No issues overnight. Passed a swallow. Given 1 dose of Decadron 10 mg. On decadron 4 mg every 6 hours. 08/22  No issues noted. Required 3 doses of antihypertensive agents. Did receive Tigan 2 doses. Much more alert this morning. Answers questions. Moving al extremities. Holding cup & drinking without difficulty   08/23  No acute events, asking appropriate questions, for OR this AM  08/24   Afebrile. ICP ranges to 21. Mannitol given this am.  Required 12 units of insulin coverage. Received more than 12 doses of prn antihypertensive agents. 08/25  Awake. Conversant. Word slad continues. No issues overnight. Required 18 units of insulin . Required 4 doses of antihypertensive agents. No further episodes of Afib reported. Passed swallow with speech therapy yesterday. 08/26  No issues overnight. Required 2 dose of prn antihypertensive agents  & 2 units of regular insulin. No further dysrhythmia issues. 8/27 no issues overnight. Required one PRN overnight. PHYSICAL EXAM:    BP (!) 137/59   Pulse 74   Temp 99.1 °F (37.3 °C) (Temporal)   Resp 18   Ht 5' 4\" (1.626 m)   Wt (!) 332 lb (150.6 kg)   SpO2 97%   BMI 56.99 kg/m²     General appearance:  Comfortable.        GCS:    4 - Opens eyes on own   6 - Follows simple motor commands  4 - Seems confused, disoriented    Pupil size: Left 3 mm  Right 3 mm  Pupil reaction: Yes  Wiggles fingers: Left Yes Right Yes  Hand grasp:   Left normal     Right normal  Wiggles toes: Left Yes    Right Yes  Plantar flexion: Left normal    Right normal    CONSTITUTIONAL: no acute distress, lying in hospital bed  NEUROLOGIC: PERRL, oriented x 4  CARDIOVASCULAR: S1 S2, regular rate, regular rhythm, no murmur/gallop/rub. Monitor: NSR  PULMONARY: no rhonchi/rales/wheezes, no use of accessory muscles  RENAL: clear yellow urine  ABDOMEN: soft, nontender, nondistended, nontympanic, normal bowel sounds   MUSCULOSKELETAL: moves all extremities 5/5 strength   SKIN/EXTREMITIES: no rashes/ecchymosis, no edema/clubbing, warm/dry, good capillary refill     LINES: PIV    CONSULTS:   Medicine  Neurosurgery  Palliative  PMR    Past Medical History:   Diagnosis Date    Anemia     Asthma     Bipolar 1 disorder (HCC)     Depression     Diabetes mellitus ([facility] [address]     Hypertension     PMB (postmenopausal bleeding)     Stress incontinence     Thyroid disease     nodules in thyroid- followed by Dr. Donnie Brand. 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 S/P Craniotomy for brain biopsy. Hx of bipolar disorder & depression. Monitor neuro status. Neurosurgery following. Keppra for seizure prophylaxis. Lithium. Paxil. Decadron. CT head. ICP monitor to be removed 8/28  CV:  No acute issues. Hx of HTN. Monitor hemodynamics. BP goal systolic less than 500 mm Hg. PRN hydralzine & labetalol. Norvasc. Metoprolol. Pulm: No acute issues. Monitor RR & SpO2. O2 as needed. PRN Albuterol. Encourage cough, SMI  & deep breathing. GI: No acute issues. Morbid obesity. BMI 56. Monitor bowel function. Tigan. Scopolamine patch. Renal:  No acute issues. Hx of stress incontinence. Monitor BUN & Cr, electrolytes & replace as needed. Monitor I & O. Arciniega. ID: Leukocytosis. Vancomycin. Day 5. Endocrine: Hyperglycemia. Hx of diabetes & hypothyroid nodules. Monitor BS.  ISS.   MSK:  Deconditioned. ROM. Turn & reposition. PT & OT. Monitor for skin breakdown. Heme: No acute issues. Monitor CBC.      Bowel regimen: bisacodyl. Pain control/Sedation: dilaudid Tylenol  DVT prophylaxis: SCDs. No Lovenox/heparin until ok with neurosurgery. GI prophylaxis: ansoprazole, diet  Arciniega: Keep in place for critical care monitoring of fluid balance.     Family update: all questions answered  Code status:  full  Disposition:  ICU    Electronically signed by Sheryl Flower CNP on 8/27/2021 at 10:32 AM

## 2021-08-27 NOTE — PROGRESS NOTES
Consult received and case reviewed with Dr. Aashish Ibarra.   Recommend [facility] level of care as patient is too low level for an acute rehab program.

## 2021-08-27 NOTE — PROGRESS NOTES
Dr. Sandoval Portillo at bedside. Removed ICP monitor. Applied bacitracin zinc ointment. x2 4x4 gauze. Verbal orders for ointment, strict precautions on not lying <30 degrees and okay for transfer to step down unit.

## 2021-08-27 NOTE — PROGRESS NOTES
Preliminary pathology consistent with GBM  Discussed with family  ICP sensor removed  Will consult radiation oncology

## 2021-08-27 NOTE — PROGRESS NOTES
enhancing peripheral component. A primary brain glioma is suspected. Moderate mass effect upon the left lateral ventricle and mild midline shift from left to right is stable. CT CHEST W CONTRAST    Result Date: 8/22/2021  EXAMINATION: CT OF THE CHEST WITH CONTRAST; CT OF THE ABDOMEN AND PELVIS WITH CONTRAST; CT OF THE CERVICAL SPINE WITHOUT CONTRAST 8/22/2021 10:20 am TECHNIQUE: CT of the chest was performed with the administration of intravenous contrast. Multiplanar reformatted images are provided for review. Dose modulation, iterative reconstruction, and/or weight based adjustment of the mA/kV was utilized to reduce the radiation dose to as low as reasonably achievable.; CT of the abdomen and pelvis was performed with the administration of intravenous contrast. Multiplanar reformatted images are provided for review. Dose modulation, iterative reconstruction, and/or weight based adjustment of the mA/kV was utilized to reduce the radiation dose to as low as reasonably achievable.; CT of the cervical spine was performed without the administration of intravenous contrast. Multiplanar reformatted images are provided for review. Dose modulation, iterative reconstruction, and/or weight based adjustment of the mA/kV was utilized to reduce the radiation dose to as low as reasonably achievable. COMPARISON: None. HISTORY: ORDERING SYSTEM PROVIDED HISTORY: Brain tumor TECHNOLOGIST PROVIDED HISTORY: Reason for exam:->Brain tumor What reading provider will be dictating this exam?->CRC FINDINGS: CT cervical spine. There is no acute fracture or dislocation in the cervical spine. There is diffuse advanced degenerative changes from C2-T1 with osteophytes and multilevel disc bulges. Large anterior osteophytes are also noted. The prevertebral soft tissues are normal.  A well corticated bony fragment is identified at the tip of the dense probably a previous ununited ossifications center versus old avulsion injury.   Note is made of a large cystic mass in the left temporal fossa measuring 6 x 4 cm likely necrotic malignancy. Brain abscess can have a similar appearance. No acute displaced fracture. Advanced diffuse degenerative changes with multilevel disc bulges from C2-T1. A large necrotic mass in the left temporal lobe. CT chest. There is cardiomegaly. The great vessels are normal.  Trachea and major bronchi are patent. There is atelectasis in the lung bases. There is mosaic pattern of ground-glass opacities which may be due to air trapping due to small airway disease. Edema or pneumonia are less likely. Degenerative changes are identified in the thoracic spine with bridging osteophytes. 1 1.1 cm right thyroid nodule is present. Consider ultrasonographic surveillance. Impression Cardiomegaly with mild diffuse ground-glass opacities and atelectasis likely due to air trapping with small airway disease. Pneumonia or edema are less likely. CT abdomen pelvis. There is hepatomegaly with liver measuring 18 cm with diffuse fatty infiltration. There is a 3.4 x 3.4 cm infiltrative hypodense lesion in the medial left hepatic lobe which is indeterminate for malignancy. Gallbladder is distended. Spleen appears normal.  1.1 cm low-attenuation lesion is identified in the body of pancreas of unknown etiology. Adrenals and the kidneys are normal.  Degenerative changes are noted in the lumbar spine. Fat containing umbilical hernia is noted. Pelvis. Bladder is contracted with a Arciniega catheter. Colon is collapsed. The appendix is normal. Impression Fatty liver with a 3.4 x 3.4 cm ill-defined indeterminate hypodense lesion in the medial left hepatic lobe. Developing malignancy is a consideration. Consider PET-CT scan. 1.1 cm indeterminate hypodense lesion in the body of pancreas. Surveillance is recommended.      CT CERVICAL SPINE WO CONTRAST    Result Date: 8/22/2021  EXAMINATION: CT OF THE CHEST WITH CONTRAST; CT OF THE ABDOMEN AND PELVIS WITH CONTRAST; CT OF THE CERVICAL SPINE WITHOUT CONTRAST 8/22/2021 10:20 am TECHNIQUE: CT of the chest was performed with the administration of intravenous contrast. Multiplanar reformatted images are provided for review. Dose modulation, iterative reconstruction, and/or weight based adjustment of the mA/kV was utilized to reduce the radiation dose to as low as reasonably achievable.; CT of the abdomen and pelvis was performed with the administration of intravenous contrast. Multiplanar reformatted images are provided for review. Dose modulation, iterative reconstruction, and/or weight based adjustment of the mA/kV was utilized to reduce the radiation dose to as low as reasonably achievable.; CT of the cervical spine was performed without the administration of intravenous contrast. Multiplanar reformatted images are provided for review. Dose modulation, iterative reconstruction, and/or weight based adjustment of the mA/kV was utilized to reduce the radiation dose to as low as reasonably achievable. COMPARISON: None. HISTORY: ORDERING SYSTEM PROVIDED HISTORY: Brain tumor TECHNOLOGIST PROVIDED HISTORY: Reason for exam:->Brain tumor What reading provider will be dictating this exam?->CRC FINDINGS: CT cervical spine. There is no acute fracture or dislocation in the cervical spine. There is diffuse advanced degenerative changes from C2-T1 with osteophytes and multilevel disc bulges. Large anterior osteophytes are also noted. The prevertebral soft tissues are normal.  A well corticated bony fragment is identified at the tip of the dense probably a previous ununited ossifications center versus old avulsion injury. Note is made of a large cystic mass in the left temporal fossa measuring 6 x 4 cm likely necrotic malignancy. Brain abscess can have a similar appearance. No acute displaced fracture. Advanced diffuse degenerative changes with multilevel disc bulges from C2-T1.  A large necrotic mass in the left temporal lobe. CT chest. There is cardiomegaly. The great vessels are normal.  Trachea and major bronchi are patent. There is atelectasis in the lung bases. There is mosaic pattern of ground-glass opacities which may be due to air trapping due to small airway disease. Edema or pneumonia are less likely. Degenerative changes are identified in the thoracic spine with bridging osteophytes. 1 1.1 cm right thyroid nodule is present. Consider ultrasonographic surveillance. Impression Cardiomegaly with mild diffuse ground-glass opacities and atelectasis likely due to air trapping with small airway disease. Pneumonia or edema are less likely. CT abdomen pelvis. There is hepatomegaly with liver measuring 18 cm with diffuse fatty infiltration. There is a 3.4 x 3.4 cm infiltrative hypodense lesion in the medial left hepatic lobe which is indeterminate for malignancy. Gallbladder is distended. Spleen appears normal.  1.1 cm low-attenuation lesion is identified in the body of pancreas of unknown etiology. Adrenals and the kidneys are normal.  Degenerative changes are noted in the lumbar spine. Fat containing umbilical hernia is noted. Pelvis. Bladder is contracted with a Arciniega catheter. Colon is collapsed. The appendix is normal. Impression Fatty liver with a 3.4 x 3.4 cm ill-defined indeterminate hypodense lesion in the medial left hepatic lobe. Developing malignancy is a consideration. Consider PET-CT scan. 1.1 cm indeterminate hypodense lesion in the body of pancreas. Surveillance is recommended. CT ABDOMEN PELVIS W IV CONTRAST Additional Contrast? Radiologist Recommendation    Result Date: 8/22/2021  EXAMINATION: CT OF THE CHEST WITH CONTRAST; CT OF THE ABDOMEN AND PELVIS WITH CONTRAST; CT OF THE CERVICAL SPINE WITHOUT CONTRAST 8/22/2021 10:20 am TECHNIQUE: CT of the chest was performed with the administration of intravenous contrast. Multiplanar reformatted images are provided for review.  Dose modulation, iterative reconstruction, and/or weight based adjustment of the mA/kV was utilized to reduce the radiation dose to as low as reasonably achievable.; CT of the abdomen and pelvis was performed with the administration of intravenous contrast. Multiplanar reformatted images are provided for review. Dose modulation, iterative reconstruction, and/or weight based adjustment of the mA/kV was utilized to reduce the radiation dose to as low as reasonably achievable.; CT of the cervical spine was performed without the administration of intravenous contrast. Multiplanar reformatted images are provided for review. Dose modulation, iterative reconstruction, and/or weight based adjustment of the mA/kV was utilized to reduce the radiation dose to as low as reasonably achievable. COMPARISON: None. HISTORY: ORDERING SYSTEM PROVIDED HISTORY: Brain tumor TECHNOLOGIST PROVIDED HISTORY: Reason for exam:->Brain tumor What reading provider will be dictating this exam?->CRC FINDINGS: CT cervical spine. There is no acute fracture or dislocation in the cervical spine. There is diffuse advanced degenerative changes from C2-T1 with osteophytes and multilevel disc bulges. Large anterior osteophytes are also noted. The prevertebral soft tissues are normal.  A well corticated bony fragment is identified at the tip of the dense probably a previous ununited ossifications center versus old avulsion injury. Note is made of a large cystic mass in the left temporal fossa measuring 6 x 4 cm likely necrotic malignancy. Brain abscess can have a similar appearance. No acute displaced fracture. Advanced diffuse degenerative changes with multilevel disc bulges from C2-T1. A large necrotic mass in the left temporal lobe. CT chest. There is cardiomegaly. The great vessels are normal.  Trachea and major bronchi are patent. There is atelectasis in the lung bases.   There is mosaic pattern of ground-glass opacities which may be due to air Oral QAM AC    levETIRAcetam  750 mg Oral BID    Or    levetiracetam  750 mg IntraVENous BID    metoprolol tartrate  25 mg Oral BID    insulin lispro  0-18 Units Subcutaneous TID WC    insulin lispro  0-9 Units Subcutaneous Nightly    vancomycin  1,750 mg IntraVENous Q12H    scopolamine  1 patch Transdermal Q72H    sodium chloride flush  10 mL IntraVENous Once    sodium chloride flush  5-40 mL IntraVENous 2 times per day    amLODIPine  5 mg Oral Daily    lithium  300 mg Oral 4x Daily    PARoxetine  40 mg Oral QAM    dexamethasone  4 mg IntraVENous Q6H     Follows commands perrl eomi   Assessment:  Patient Active Problem List   Diagnosis    Morbid obesity ([facility] [address]    Brain mass    Brain bleed (HCC)    Essential hypertension    Depression    Leukocytosis    AMS (altered mental status)    Morbid obesity with BMI of 50.0-59.9, adult (HCC)    Hyponatremia    Paroxysmal atrial fibrillation (HCC)    Liver lesion - INCIDENTAL     Plan:await final path Continue current care  Kina Carlson MD M.D.

## 2021-08-28 LAB
ALBUMIN SERPL-MCNC: 3.5 G/DL (ref 3.5–5.2)
ALP BLD-CCNC: 26 U/L (ref 35–104)
ALT SERPL-CCNC: 122 U/L (ref 0–32)
ANION GAP SERPL CALCULATED.3IONS-SCNC: 9 MMOL/L (ref 7–16)
AST SERPL-CCNC: 70 U/L (ref 0–31)
BILIRUB SERPL-MCNC: 0.9 MG/DL (ref 0–1.2)
BUN BLDV-MCNC: 16 MG/DL (ref 6–20)
CALCIUM SERPL-MCNC: 9.5 MG/DL (ref 8.6–10.2)
CHLORIDE BLD-SCNC: 104 MMOL/L (ref 98–107)
CO2: 26 MMOL/L (ref 22–29)
CREAT SERPL-MCNC: 0.5 MG/DL (ref 0.5–1)
GFR AFRICAN AMERICAN: >60
GFR NON-AFRICAN AMERICAN: >60 ML/MIN/1.73
GLUCOSE BLD-MCNC: 153 MG/DL (ref 74–99)
HCT VFR BLD CALC: 40.5 % (ref 34–48)
HEMOGLOBIN: 12.9 G/DL (ref 11.5–15.5)
MCH RBC QN AUTO: 26.5 PG (ref 26–35)
MCHC RBC AUTO-ENTMCNC: 31.9 % (ref 32–34.5)
MCV RBC AUTO: 83.2 FL (ref 80–99.9)
METER GLUCOSE: 156 MG/DL (ref 74–99)
METER GLUCOSE: 162 MG/DL (ref 74–99)
METER GLUCOSE: 182 MG/DL (ref 74–99)
METER GLUCOSE: 183 MG/DL (ref 74–99)
PDW BLD-RTO: 14.6 FL (ref 11.5–15)
PLATELET # BLD: 215 E9/L (ref 130–450)
PMV BLD AUTO: 12.5 FL (ref 7–12)
POTASSIUM SERPL-SCNC: 4.6 MMOL/L (ref 3.5–5)
RBC # BLD: 4.87 E12/L (ref 3.5–5.5)
SODIUM BLD-SCNC: 139 MMOL/L (ref 132–146)
TOTAL PROTEIN: 6.2 G/DL (ref 6.4–8.3)
WBC # BLD: 12.3 E9/L (ref 4.5–11.5)

## 2021-08-28 PROCEDURE — 6370000000 HC RX 637 (ALT 250 FOR IP): Performed by: NEUROLOGICAL SURGERY

## 2021-08-28 PROCEDURE — 6360000002 HC RX W HCPCS: Performed by: CLINICAL NURSE SPECIALIST

## 2021-08-28 PROCEDURE — 82962 GLUCOSE BLOOD TEST: CPT

## 2021-08-28 PROCEDURE — 2580000003 HC RX 258: Performed by: NEUROLOGICAL SURGERY

## 2021-08-28 PROCEDURE — 6370000000 HC RX 637 (ALT 250 FOR IP): Performed by: CLINICAL NURSE SPECIALIST

## 2021-08-28 PROCEDURE — 6360000002 HC RX W HCPCS: Performed by: NEUROLOGICAL SURGERY

## 2021-08-28 PROCEDURE — 36415 COLL VENOUS BLD VENIPUNCTURE: CPT

## 2021-08-28 PROCEDURE — 2060000000 HC ICU INTERMEDIATE R&B

## 2021-08-28 PROCEDURE — 85027 COMPLETE CBC AUTOMATED: CPT

## 2021-08-28 PROCEDURE — 80053 COMPREHEN METABOLIC PANEL: CPT

## 2021-08-28 RX ORDER — CARVEDILOL 6.25 MG/1
6.25 TABLET ORAL 2 TIMES DAILY WITH MEALS
Status: DISCONTINUED | OUTPATIENT
Start: 2021-08-29 | End: 2021-08-31 | Stop reason: HOSPADM

## 2021-08-28 RX ADMIN — LITHIUM CARBONATE 300 MG: 300 CAPSULE ORAL at 17:15

## 2021-08-28 RX ADMIN — INSULIN LISPRO 3 UNITS: 100 INJECTION, SOLUTION INTRAVENOUS; SUBCUTANEOUS at 18:08

## 2021-08-28 RX ADMIN — LITHIUM CARBONATE 300 MG: 300 CAPSULE ORAL at 09:53

## 2021-08-28 RX ADMIN — METOPROLOL TARTRATE 25 MG: 25 TABLET, FILM COATED ORAL at 09:52

## 2021-08-28 RX ADMIN — LEVETIRACETAM 750 MG: 500 SOLUTION ORAL at 09:52

## 2021-08-28 RX ADMIN — HYDRALAZINE HYDROCHLORIDE 10 MG: 20 INJECTION INTRAMUSCULAR; INTRAVENOUS at 17:15

## 2021-08-28 RX ADMIN — DEXAMETHASONE SODIUM PHOSPHATE 4 MG: 4 INJECTION, SOLUTION INTRAMUSCULAR; INTRAVENOUS at 20:09

## 2021-08-28 RX ADMIN — LEVETIRACETAM 750 MG: 500 SOLUTION ORAL at 20:14

## 2021-08-28 RX ADMIN — Medication 10 ML: at 20:14

## 2021-08-28 RX ADMIN — PAROXETINE 40 MG: 20 TABLET, FILM COATED ORAL at 09:53

## 2021-08-28 RX ADMIN — DEXAMETHASONE SODIUM PHOSPHATE 4 MG: 4 INJECTION, SOLUTION INTRAMUSCULAR; INTRAVENOUS at 09:52

## 2021-08-28 RX ADMIN — LITHIUM CARBONATE 300 MG: 300 CAPSULE ORAL at 13:13

## 2021-08-28 RX ADMIN — DEXAMETHASONE SODIUM PHOSPHATE 4 MG: 4 INJECTION, SOLUTION INTRAMUSCULAR; INTRAVENOUS at 17:15

## 2021-08-28 RX ADMIN — LITHIUM CARBONATE 300 MG: 300 CAPSULE ORAL at 20:08

## 2021-08-28 RX ADMIN — BACITRACIN ZINC, NEOMYCIN, POLYMYXIN B SULFAT: 5000; 3.5; 4 OINTMENT TOPICAL at 09:55

## 2021-08-28 RX ADMIN — Medication 10 ML: at 09:55

## 2021-08-28 RX ADMIN — INSULIN LISPRO 2 UNITS: 100 INJECTION, SOLUTION INTRAVENOUS; SUBCUTANEOUS at 20:15

## 2021-08-28 RX ADMIN — METOPROLOL TARTRATE 25 MG: 25 TABLET, FILM COATED ORAL at 20:08

## 2021-08-28 RX ADMIN — DEXAMETHASONE SODIUM PHOSPHATE 4 MG: 4 INJECTION, SOLUTION INTRAMUSCULAR; INTRAVENOUS at 03:22

## 2021-08-28 RX ADMIN — INSULIN LISPRO 3 UNITS: 100 INJECTION, SOLUTION INTRAVENOUS; SUBCUTANEOUS at 09:53

## 2021-08-28 RX ADMIN — BACITRACIN ZINC, NEOMYCIN, POLYMYXIN B SULFAT: 5000; 3.5; 4 OINTMENT TOPICAL at 20:09

## 2021-08-28 RX ADMIN — INSULIN LISPRO 3 UNITS: 100 INJECTION, SOLUTION INTRAVENOUS; SUBCUTANEOUS at 13:13

## 2021-08-28 RX ADMIN — AMLODIPINE BESYLATE 5 MG: 5 TABLET ORAL at 09:52

## 2021-08-28 ASSESSMENT — PAIN SCALES - GENERAL
PAINLEVEL_OUTOF10: 0

## 2021-08-28 NOTE — PROGRESS NOTES
Updated Jasmin(sister) regarding patients condition. Questions answered and concerns addressed at this time.

## 2021-08-28 NOTE — PROGRESS NOTES
atrium size. Mitral Valve  Normal mitral valve structure and function. Tricuspid Valve  The tricuspid valve was not well visualized. Aortic Valve  The aortic valve leaflets were not well visualized. The aortic valve appears mildly sclerotic. Pulmonic Valve  The pulmonic valve was not well visualized. Pericardial Effusion  No evidence of pericardial effusion. Aorta  Aortic root dimension within normal limits. Conclusions   Summary  Mildly dilated left ventricle. Borderline concentric left ventricular hypertrophy. No regional wall motion abnormalities seen. Mildly dilated right ventricle. Right ventricle global systolic function is normal .  The aortic valve appears mildly sclerotic.    Signature   ----------------------------------------------------------------  Electronically signed by Jayde Hitchcock MD(Interpreting  physician) on 08/27/2021 02:05 PM  ----------------------------------------------------------------  M-Mode/2D Measurements & Calculations   LV Diastolic    LV Systolic Dimension: 3.8   AV Cusp Separation: 1.9 cmLA  Dimension: 5.9  cm                           Dimension: 3.8 cmAO Root  cm              LV Volume Diastolic: 916.4   Dimension: 3 cm  LV FS:35.6 %    ml  LV PW           LV Volume Systolic: 74.3 ml  Diastolic: 1.2  LV EDV/LV EDV Index: 174.8  cm              ml/72 ml/m^2LV ESV/LV ESV    RV Diastolic Dimension: 2 cm  Septum          Index: 62.5 PH/35EJ/ m^2  Diastolic: 1.2  EF Calculated: 64.2 %        Ascending Aorta: 2.5 cm  cm              LV Mass Index: 126 l/min*m^2 LA volume/Index: 64.6 ml                                               /26.57ml/m^2  LV Mass: 305.45                              RA Area: 14.8 cm^2  g               LVOT: 1.9 cm  Doppler Measurements & Calculations   MV Peak E-Wave: 1.35 AV Peak Velocity:     LVOT Peak Velocity: 1.54 m/s  m/s                  2.08 m/s              LVOT Mean Velocity: 1.08 m/s  MV Peak A-Wave: 0.94 AV Peak Gradient:     LVOT Peak Gradient: 9.5  m/s                  17.36 mmHg            mmHgLVOT Mean Gradient: 5.3  MV E/A Ratio: 1.44   AV Mean Velocity:     mmHg  MV Peak Gradient:    1.44 m/s              Estimated RVSP: 40.4 mmHg  8.8 mmHg             AV Mean Gradient: 9.3 Estimated RAP:3 mmHg  MV Mean Gradient:    mmHg  3.1 mmHg             AV VTI: 41.4 cm  MV Mean Velocity:    AV Area               TR Velocity:3.06 m/s  0.81 m/s             (Continuity):2.51     TR Gradient:37.36 mmHg  MV Deceleration      cm^2                  PV Peak Velocity: 1.37 m/s  Time: 196.5 msec                           PV Peak Gradient: 7.53 mmHg  MV P1/2t: 45.8 msec  LVOT VTI: 36.7 cm     PV Mean Velocity: 1.08 m/s  MVA by PHT:4.8 cm^2                        PV Mean Gradient: 5 mmHg  MV Area              Estimated PASP: 40.36  (continuity): 3 cm^2 mmHg  MV E' Septal  Velocity: 0.08 m/s  MV E' Lateral  Velocity: 9 m/s  [id number]    CBC:   Lab Results   Component Value Date    WBC 10.0 08/27/2021    RBC 4.47 08/27/2021    RBC 4.68 04/26/2017    HGB 11.9 08/27/2021    HCT 38.3 08/27/2021    MCV 85.7 08/27/2021    MCH 26.6 08/27/2021    MCHC 31.1 08/27/2021    RDW 14.8 08/27/2021     08/27/2021    MPV 11.4 08/27/2021     BMP:    Lab Results   Component Value Date     08/27/2021    K 4.5 08/27/2021    K 4.7 08/21/2021     08/27/2021    CO2 23 08/27/2021    BUN 18 08/27/2021    LABALBU 3.3 08/27/2021    CREATININE 0.5 08/27/2021    CALCIUM 9.0 08/27/2021    GFRAA >60 08/27/2021    LABGLOM >60 08/27/2021    GLUCOSE 176 08/27/2021      neomycin-bacitracin-polymyxin   Topical BID    lansoprazole  15 mg Oral QAM AC    levETIRAcetam  750 mg Oral BID    Or    levetiracetam  750 mg IntraVENous BID    metoprolol tartrate  25 mg Oral BID    insulin lispro  0-18 Units Subcutaneous TID WC    insulin lispro  0-9 Units Subcutaneous Nightly    scopolamine  1 patch Transdermal Q72H    sodium chloride flush  10 mL IntraVENous Once    sodium chloride flush  5-40 mL IntraVENous 2 times per day    amLODIPine  5 mg Oral Daily    lithium  300 mg Oral 4x Daily    PARoxetine  40 mg Oral QAM    dexamethasone  4 mg IntraVENous Q6H     Opens eyes to voice and follows commands   Assessment:  Patient Active Problem List   Diagnosis    Morbid obesity ([facility] [address]    Brain mass    Brain bleed ([facility] [address]    Essential hypertension    Depression    Leukocytosis    AMS (altered mental status)    Morbid obesity with BMI of 50.0-59.9, adult (HCC)    Hyponatremia    Paroxysmal atrial fibrillation ([facility] [address]    Liver lesion - INCIDENTAL    Glioblastoma (HCC)     Plan:Continue current care  Leonardo Paez MD M.D.

## 2021-08-29 LAB
BACTERIA: ABNORMAL /HPF
BILIRUBIN URINE: NEGATIVE
BLOOD, URINE: ABNORMAL
CLARITY: CLEAR
COLOR: YELLOW
GLUCOSE URINE: NEGATIVE MG/DL
KETONES, URINE: NEGATIVE MG/DL
LEUKOCYTE ESTERASE, URINE: ABNORMAL
METER GLUCOSE: 168 MG/DL (ref 74–99)
METER GLUCOSE: 180 MG/DL (ref 74–99)
METER GLUCOSE: 199 MG/DL (ref 74–99)
METER GLUCOSE: 236 MG/DL (ref 74–99)
NITRITE, URINE: ABNORMAL
PH UA: 7.5 (ref 5–9)
PROTEIN UA: NEGATIVE MG/DL
RBC UA: ABNORMAL /HPF (ref 0–2)
SPECIFIC GRAVITY UA: 1.02 (ref 1–1.03)
UROBILINOGEN, URINE: 1 E.U./DL
WBC UA: ABNORMAL /HPF (ref 0–5)

## 2021-08-29 PROCEDURE — 6370000000 HC RX 637 (ALT 250 FOR IP): Performed by: INTERNAL MEDICINE

## 2021-08-29 PROCEDURE — 2580000003 HC RX 258: Performed by: NEUROLOGICAL SURGERY

## 2021-08-29 PROCEDURE — 82962 GLUCOSE BLOOD TEST: CPT

## 2021-08-29 PROCEDURE — 2060000000 HC ICU INTERMEDIATE R&B

## 2021-08-29 PROCEDURE — 6360000002 HC RX W HCPCS: Performed by: CLINICAL NURSE SPECIALIST

## 2021-08-29 PROCEDURE — 6370000000 HC RX 637 (ALT 250 FOR IP): Performed by: CLINICAL NURSE SPECIALIST

## 2021-08-29 PROCEDURE — 6360000002 HC RX W HCPCS: Performed by: NEUROLOGICAL SURGERY

## 2021-08-29 PROCEDURE — 6370000000 HC RX 637 (ALT 250 FOR IP): Performed by: NEUROLOGICAL SURGERY

## 2021-08-29 PROCEDURE — 81001 URINALYSIS AUTO W/SCOPE: CPT

## 2021-08-29 RX ADMIN — LITHIUM CARBONATE 300 MG: 300 CAPSULE ORAL at 18:03

## 2021-08-29 RX ADMIN — HYDRALAZINE HYDROCHLORIDE 10 MG: 20 INJECTION INTRAMUSCULAR; INTRAVENOUS at 00:31

## 2021-08-29 RX ADMIN — CARVEDILOL 6.25 MG: 6.25 TABLET, FILM COATED ORAL at 09:34

## 2021-08-29 RX ADMIN — DEXAMETHASONE SODIUM PHOSPHATE 4 MG: 4 INJECTION, SOLUTION INTRAMUSCULAR; INTRAVENOUS at 20:54

## 2021-08-29 RX ADMIN — LEVETIRACETAM 750 MG: 500 SOLUTION ORAL at 20:54

## 2021-08-29 RX ADMIN — Medication 10 ML: at 20:55

## 2021-08-29 RX ADMIN — INSULIN LISPRO 3 UNITS: 100 INJECTION, SOLUTION INTRAVENOUS; SUBCUTANEOUS at 09:36

## 2021-08-29 RX ADMIN — CARVEDILOL 6.25 MG: 6.25 TABLET, FILM COATED ORAL at 18:03

## 2021-08-29 RX ADMIN — LEVETIRACETAM 750 MG: 500 SOLUTION ORAL at 09:33

## 2021-08-29 RX ADMIN — Medication 10 ML: at 09:34

## 2021-08-29 RX ADMIN — LITHIUM CARBONATE 300 MG: 300 CAPSULE ORAL at 12:31

## 2021-08-29 RX ADMIN — BACITRACIN ZINC, NEOMYCIN, POLYMYXIN B SULFAT: 5000; 3.5; 4 OINTMENT TOPICAL at 09:34

## 2021-08-29 RX ADMIN — INSULIN LISPRO 3 UNITS: 100 INJECTION, SOLUTION INTRAVENOUS; SUBCUTANEOUS at 20:55

## 2021-08-29 RX ADMIN — PAROXETINE 40 MG: 20 TABLET, FILM COATED ORAL at 09:34

## 2021-08-29 RX ADMIN — DEXAMETHASONE SODIUM PHOSPHATE 4 MG: 4 INJECTION, SOLUTION INTRAMUSCULAR; INTRAVENOUS at 09:34

## 2021-08-29 RX ADMIN — INSULIN LISPRO 3 UNITS: 100 INJECTION, SOLUTION INTRAVENOUS; SUBCUTANEOUS at 12:31

## 2021-08-29 RX ADMIN — DEXAMETHASONE SODIUM PHOSPHATE 4 MG: 4 INJECTION, SOLUTION INTRAMUSCULAR; INTRAVENOUS at 03:45

## 2021-08-29 RX ADMIN — AMLODIPINE BESYLATE 5 MG: 5 TABLET ORAL at 09:34

## 2021-08-29 RX ADMIN — INSULIN LISPRO 3 UNITS: 100 INJECTION, SOLUTION INTRAVENOUS; SUBCUTANEOUS at 18:03

## 2021-08-29 RX ADMIN — SODIUM CHLORIDE, PRESERVATIVE FREE 10 ML: 5 INJECTION INTRAVENOUS at 15:12

## 2021-08-29 RX ADMIN — BACITRACIN ZINC, NEOMYCIN, POLYMYXIN B SULFAT: 5000; 3.5; 4 OINTMENT TOPICAL at 20:54

## 2021-08-29 RX ADMIN — LITHIUM CARBONATE 300 MG: 300 CAPSULE ORAL at 20:53

## 2021-08-29 RX ADMIN — LITHIUM CARBONATE 300 MG: 300 CAPSULE ORAL at 09:34

## 2021-08-29 RX ADMIN — DEXAMETHASONE SODIUM PHOSPHATE 4 MG: 4 INJECTION, SOLUTION INTRAMUSCULAR; INTRAVENOUS at 15:12

## 2021-08-29 ASSESSMENT — PAIN SCALES - GENERAL
PAINLEVEL_OUTOF10: 0
PAINLEVEL_OUTOF10: 0

## 2021-08-29 NOTE — PLAN OF CARE
Problem: Discharge Planning:  Goal: Discharged to appropriate level of care  Description: Discharged to appropriate level of care  Outcome: Met This Shift     Problem: Verbal Communication - Impaired:  Goal: Functional communication will improve  Description: Functional communication will improve  Outcome: Met This Shift  Goal: Ability to interact with others will improve  Description: Ability to interact with others will improve  Outcome: Met This Shift  Goal: Ability to establish a method of communication will improve  Description: Ability to establish a method of communication will improve  Outcome: Met This Shift     Problem: Pain:  Goal: Pain level will decrease  Description: Pain level will decrease  Outcome: Met This Shift  Goal: Recognizes and communicates pain  Description: Recognizes and communicates pain  Outcome: Met This Shift     Problem: Mobility - Impaired:  Goal: Able to ambulate independently  Description: Able to ambulate independently  Outcome: Met This Shift  Goal: Able to ambulate with minimal assistance  Description: Able to ambulate with minimal assistance  Outcome: Met This Shift  Goal: Ability to appropriately use an adaptive device for ambulation will improve  Description: Ability to appropriately use an adaptive device for ambulation will improve  Outcome: Met This Shift  Goal: Able to verbalize acceptance of life and situations over which he or she has no control  Description: Absence of contracture deformity  Outcome: Met This Shift     Problem: Autonomic Dysreflexia - Risk of:  Goal: Absence of autonomic dysreflexia signs and symptoms  Description: Absence of autonomic dysreflexia signs and symptoms  Outcome: Met This Shift     Problem: Injury - Risk of, Healthcare-Acquired Condition:  Goal: Absence of healthcare acquired conditions  Description: Absence of healthcare acquired conditions  Outcome: Met This Shift  Goal: Will remain free from falls  Description: Will remain free from falls  Outcome: Met This Shift  Goal: Absence of pressure ulcer  Description: Absence of pressure ulcer  Outcome: Met This Shift  Goal: Demonstration of wound healing without infection will improve  Description: Demonstration of wound healing without infection will improve  Outcome: Met This Shift  Goal: Absence of urinary tract infection signs and symptoms  Description: Absence of urinary tract infection signs and symptoms  Outcome: Met This Shift     Problem: Nutrition Deficit - Risk of:  Goal: Ability to achieve adequate nutritional intake will improve  Description: Ability to achieve adequate nutritional intake will improve  Outcome: Met This Shift  Goal: Maintenance of adequate weight for body size and type will improve to within specified parameters  Description: Maintenance of adequate weight for body size and type will improve to within specified parameters  Outcome: Met This Shift     Problem: Swallowing - Impaired:  Goal: Able to swallow without choking  Description: Able to swallow without choking  Outcome: Met This Shift  Goal: Absence of aspiration  Description: Absence of aspiration  Outcome: Met This Shift     Problem: Aspiration - Risk of:  Goal: Absence of aspiration  Description: Absence of aspiration  Outcome: Met This Shift     Problem: Respiratory Function - Compromised:  Goal: Absence of pulmonary infection  Description: Absence of pulmonary infection  Outcome: Met This Shift  Goal: Levels of oxygenation will improve  Description: Levels of oxygenation will improve  Outcome: Met This Shift  Goal: Increase in ventilator-free time  Description: Increase in ventilator-free time  Outcome: Met This Shift  Goal: Ability to maintain a clear airway will improve  Description: Ability to maintain a clear airway will improve  Outcome: Met This Shift     Problem:  Bowel Function - Altered:  Goal: Bowel elimination is within specified parameters  Description: Bowel elimination is within specified parameters  Outcome: Met This Shift  Goal: Control of bowel function will improve  Description: Control of bowel function will improve  Outcome: Met This Shift  Goal: Ability to maintain normal bowel function will improve  Description: Ability to maintain normal bowel function will improve  Outcome: Met This Shift     Problem: Urinary Elimination - Impaired:  Goal: Urinary elimination within specified parameters  Description: Urinary elimination within specified parameters  Outcome: Met This Shift  Goal: Absence of postvoid residual urine  Description: Absence of postvoid residual urine  Outcome: Met This Shift  Goal: Absence of incontinence - Urinary  Description: Absence of incontinence - Urinary  Outcome: Met This Shift  Goal: Reports of episodes of incontinence will decrease - Urinary  Description: Reports of episodes of incontinence will decrease - Urinary  Outcome: Met This Shift     Problem: Self-Care Deficit:  Goal: Ability to perform activities of daily living will improve  Description: Ability to perform activities of daily living will improve  Outcome: Met This Shift  Goal: Able to perform ADL with assistance  Description: Able to perform ADL with assistance  Outcome: Met This Shift  Goal: Ability to communicate needs accurately will improve - ADL needs  Description: Ability to communicate needs accurately will improve - ADL needs  Outcome: Met This Shift  Goal: Able to use self-care assistive device appropriately  Description: Able to use self-care assistive device appropriately  Outcome: Met This Shift     Problem: Sexual Dysfunction:  Goal: Expressions of positive opinion of body image will increase  Description: Expressions of positive opinion of body image will increase  Outcome: Met This Shift     Problem: Psychosocial Distress:  Goal: Absence of psychosocial distress  Description: Absence of psychosocial distress  Outcome: Met This Shift  Goal: Ability to cope will improve  Description: Ability to cope will improve  Outcome: Met This Shift  Goal: Ability to identify and utilize available support systems will improve  Description: Ability to identify and utilize available support systems will improve  Outcome: Met This Shift     Problem: Disruptive Behavior:  Goal: Knowledge of developmental care interventions  Description: Absence of violence  Outcome: Met This Shift  Goal: Decrease in feelings of agitation  Description: Decrease in feelings of agitation  Outcome: Met This Shift     Problem: Skin Integrity:  Goal: Will show no infection signs and symptoms  Description: Will show no infection signs and symptoms  Outcome: Met This Shift  Goal: Absence of new skin breakdown  Description: Absence of new skin breakdown  Outcome: Met This Shift     Problem: Falls - Risk of:  Goal: Will remain free from falls  Description: Will remain free from falls  Outcome: Met This Shift  Goal: Absence of physical injury  Description: Absence of physical injury  Outcome: Met This Shift     Problem: Pain:  Goal: Pain level will decrease  Description: Pain level will decrease  Outcome: Met This Shift  Goal: Control of acute pain  Description: Control of acute pain  Outcome: Met This Shift  Goal: Control of chronic pain  Description: Control of chronic pain  Outcome: Met This Shift     Problem: Neurological  Goal: Maximum potential motor/sensory/cognitive function  Outcome: Met This Shift

## 2021-08-29 NOTE — PROGRESS NOTES
Chief Complaint:  Glioblastoma ([address]     Subjective:    Minimally more aphasic this morning but it does seem to wax and wane. I saw her around 7:30 AM so possibly also just too early    Objective:    BP (!) 147/71   Pulse 74   Temp 98.2 °F (36.8 °C) (Temporal)   Resp 20   Ht 5' 4\" (1.626 m)   Wt (!) 330 lb 11 oz (150 kg)   SpO2 96%   BMI 56.76 kg/m²     Current medications that patient is taking have been reviewed.     General appearance: Nontoxic, morbidly obese  HEENT: dressing on head, MMM  Neck: FROM, supple  Lungs: Clear to auscultation anteriorly, WOB normal  CV: RRR, no MRGs  Abdomen: Soft, non-tender; no masses or HSM, +BS  Extremities: No peripheral edema or digital cyanosis  Skin: no rash, lesions or ulcers  Psych: Calm and cooperative  Neuro: Alert and interactive, face symmetric, moderately aphasic    Labs:  CBC with Differential:    Lab Results   Component Value Date    WBC 12.3 08/28/2021    RBC 4.87 08/28/2021    RBC 4.68 04/26/2017    HGB 12.9 08/28/2021    HCT 40.5 08/28/2021     08/28/2021    MCV 83.2 08/28/2021    MCH 26.5 08/28/2021    MCHC 31.9 08/28/2021    RDW 14.6 08/28/2021    LYMPHOPCT 7.8 08/20/2021    MONOPCT 1.9 08/20/2021    BASOPCT 0.2 08/20/2021    MONOSABS 0.26 08/20/2021    LYMPHSABS 1.06 08/20/2021    EOSABS 0.00 08/20/2021    BASOSABS 0.03 08/20/2021     CMP:    Lab Results   Component Value Date     08/28/2021    K 4.6 08/28/2021    K 4.7 08/21/2021     08/28/2021    CO2 26 08/28/2021    BUN 16 08/28/2021    CREATININE 0.5 08/28/2021    GFRAA >60 08/28/2021    LABGLOM >60 08/28/2021    GLUCOSE 153 08/28/2021    PROT 6.2 08/28/2021    LABALBU 3.5 08/28/2021    CALCIUM 9.5 08/28/2021    BILITOT 0.9 08/28/2021    ALKPHOS 26 08/28/2021    AST 70 08/28/2021     08/28/2021            Assessment/Plan:  Principal Problem:    Glioblastoma ([facility] [address]  Active Problems:    Brain mass    Brain bleed (HCC)    Essential hypertension    Depression    Leukocytosis AMS (altered mental status)    Morbid obesity with BMI of 50.0-59.9, adult (HCC)    Hyponatremia    Paroxysmal atrial fibrillation (HCC)    Liver lesion - INCIDENTAL  Resolved Problems:    * No resolved hospital problems. *       Unfortunately path shows GBM    Rad onc and heme/onc consulted    If there is any sustained change in neurologic status, will re-scan the head - however I think what I'm seeing this morning is just natural variation of her abilities. D/w RN and she will notify me if her neurologic status is significantly different throughout the day. Continue metoprolol for PAF    BP getting higher.   Switch metoprolol to carvedilol    Continue Keppra    Requires continued inpatient level of care   Marcela Giron MD    9:43 PM   8/28/2021

## 2021-08-29 NOTE — PROGRESS NOTES
Chief Complaint:  Glioblastoma ([address]     Subjective:    Aphasia relatively stable, it does wax and wane. No new neurologic deficits. She reports some dysuria yesterday, but better today    Objective:    BP (!) 123/58   Pulse 66   Temp 97.8 °F (36.6 °C) (Temporal)   Resp 18   Ht 5' 4\" (1.626 m)   Wt (!) 334 lb 3.5 oz (151.6 kg)   SpO2 96%   BMI 57.37 kg/m²     Current medications that patient is taking have been reviewed. General appearance: Nontoxic, morbidly obese  HEENT: dressing on head, MMM  Neck: FROM, supple  Lungs: Clear to auscultation anteriorly, WOB normal  CV: RRR, no MRGs  Abdomen: Soft, non-tender; no masses or HSM, +BS  Extremities: No peripheral edema or digital cyanosis  Skin: no rash, lesions or ulcers  Psych: Calm and cooperative  Neuro: Alert and interactive, face symmetric, moderately aphasic.   R handgrip weaker than other muscle groups, but can move all extremities weakly    Labs:  CBC with Differential:    Lab Results   Component Value Date    WBC 12.3 08/28/2021    RBC 4.87 08/28/2021    RBC 4.68 04/26/2017    HGB 12.9 08/28/2021    HCT 40.5 08/28/2021     08/28/2021    MCV 83.2 08/28/2021    MCH 26.5 08/28/2021    MCHC 31.9 08/28/2021    RDW 14.6 08/28/2021    LYMPHOPCT 7.8 08/20/2021    MONOPCT 1.9 08/20/2021    BASOPCT 0.2 08/20/2021    MONOSABS 0.26 08/20/2021    LYMPHSABS 1.06 08/20/2021    EOSABS 0.00 08/20/2021    BASOSABS 0.03 08/20/2021     CMP:    Lab Results   Component Value Date     08/28/2021    K 4.6 08/28/2021    K 4.7 08/21/2021     08/28/2021    CO2 26 08/28/2021    BUN 16 08/28/2021    CREATININE 0.5 08/28/2021    GFRAA >60 08/28/2021    LABGLOM >60 08/28/2021    GLUCOSE 153 08/28/2021    PROT 6.2 08/28/2021    LABALBU 3.5 08/28/2021    CALCIUM 9.5 08/28/2021    BILITOT 0.9 08/28/2021    ALKPHOS 26 08/28/2021    AST 70 08/28/2021     08/28/2021            Assessment/Plan:  Principal Problem:    Glioblastoma ([facility] [address]  Active Problems: Brain mass    Brain bleed (HCC)    Essential hypertension    Depression    Leukocytosis    AMS (altered mental status)    Morbid obesity with BMI of 50.0-59.9, adult (HCC)    Hyponatremia    Paroxysmal atrial fibrillation (HCC)    Liver lesion - INCIDENTAL  Resolved Problems:    * No resolved hospital problems. *       Unfortunately path shows GBM    Rad onc and heme/onc consulted. Not sure why rad onc not notified. Consult re-placed and will verify tomorrow.     Continue metoprolol for PAF    BP better today    Continue Keppra    Requires continued inpatient level of care   Dina Daniel MD    6:52 PM   8/29/2021

## 2021-08-29 NOTE — PROGRESS NOTES
Valve  The aortic valve leaflets were not well visualized. The aortic valve appears mildly sclerotic. Pulmonic Valve  The pulmonic valve was not well visualized. Pericardial Effusion  No evidence of pericardial effusion. Aorta  Aortic root dimension within normal limits. Conclusions   Summary  Mildly dilated left ventricle. Borderline concentric left ventricular hypertrophy. No regional wall motion abnormalities seen. Mildly dilated right ventricle. Right ventricle global systolic function is normal .  The aortic valve appears mildly sclerotic.    Signature   ----------------------------------------------------------------  Electronically signed by Krupa Dick MD(Interpreting  physician) on 08/27/2021 02:05 PM  ----------------------------------------------------------------  M-Mode/2D Measurements & Calculations   LV Diastolic    LV Systolic Dimension: 3.8   AV Cusp Separation: 1.9 cmLA  Dimension: 5.9  cm                           Dimension: 3.8 cmAO Root  cm              LV Volume Diastolic: 026.7   Dimension: 3 cm  LV FS:35.6 %    ml  LV PW           LV Volume Systolic: 49.6 ml  Diastolic: 1.2  LV EDV/LV EDV Index: 174.8  cm              ml/72 ml/m^2LV ESV/LV ESV    RV Diastolic Dimension: 2 cm  Septum          Index: 62.5 ZD/43GN/ m^2  Diastolic: 1.2  EF Calculated: 64.2 %        Ascending Aorta: 2.5 cm  cm              LV Mass Index: 126 l/min*m^2 LA volume/Index: 64.6 ml                                               /26.57ml/m^2  LV Mass: 305.45                              RA Area: 14.8 cm^2  g               LVOT: 1.9 cm  Doppler Measurements & Calculations   MV Peak E-Wave: 1.35 AV Peak Velocity:     LVOT Peak Velocity: 1.54 m/s  m/s                  2.08 m/s              LVOT Mean Velocity: 1.08 m/s  MV Peak A-Wave: 0.94 AV Peak Gradient:     LVOT Peak Gradient: 9.5  m/s                  17.36 mmHg            mmHgLVOT Mean Gradient: 5.3  MV E/A Ratio: 1.44   AV Mean Velocity:     mmHg  MV Peak Gradient:    1.44 m/s              Estimated RVSP: 40.4 mmHg  8.8 mmHg             AV Mean Gradient: 9.3 Estimated RAP:3 mmHg  MV Mean Gradient:    mmHg  3.1 mmHg             AV VTI: 41.4 cm  MV Mean Velocity:    AV Area               TR Velocity:3.06 m/s  0.81 m/s             (Continuity):2.51     TR Gradient:37.36 mmHg  MV Deceleration      cm^2                  PV Peak Velocity: 1.37 m/s  Time: 196.5 msec                           PV Peak Gradient: 7.53 mmHg  MV P1/2t: 45.8 msec  LVOT VTI: 36.7 cm     PV Mean Velocity: 1.08 m/s  MVA by PHT:4.8 cm^2                        PV Mean Gradient: 5 mmHg  MV Area              Estimated PASP: 40.36  (continuity): 3 cm^2 mmHg  MV E' Septal  Velocity: 0.08 m/s  MV E' Lateral  Velocity: 9 m/s  [id number]    CBC:   Lab Results   Component Value Date    WBC 12.3 08/28/2021    RBC 4.87 08/28/2021    RBC 4.68 04/26/2017    HGB 12.9 08/28/2021    HCT 40.5 08/28/2021    MCV 83.2 08/28/2021    MCH 26.5 08/28/2021    MCHC 31.9 08/28/2021    RDW 14.6 08/28/2021     08/28/2021    MPV 12.5 08/28/2021     BMP:    Lab Results   Component Value Date     08/28/2021    K 4.6 08/28/2021    K 4.7 08/21/2021     08/28/2021    CO2 26 08/28/2021    BUN 16 08/28/2021    LABALBU 3.5 08/28/2021    CREATININE 0.5 08/28/2021    CALCIUM 9.5 08/28/2021    GFRAA >60 08/28/2021    LABGLOM >60 08/28/2021    GLUCOSE 153 08/28/2021      carvedilol  6.25 mg Oral BID WC    neomycin-bacitracin-polymyxin   Topical BID    lansoprazole  15 mg Oral QAM AC    levETIRAcetam  750 mg Oral BID    Or    levetiracetam  750 mg IntraVENous BID    insulin lispro  0-18 Units Subcutaneous TID WC    insulin lispro  0-9 Units Subcutaneous Nightly    scopolamine  1 patch Transdermal Q72H    sodium chloride flush  10 mL IntraVENous Once    sodium chloride flush  5-40 mL IntraVENous 2 times per day    amLODIPine  5 mg Oral Daily    lithium  300 mg Oral 4x Daily    PARoxetine  40 mg Oral QAM    dexamethasone  4 mg IntraVENous Q6H     Awake and alert, speech improved  Assessment:  Patient Active Problem List   Diagnosis    Morbid obesity ([address]    Brain mass    Brain bleed ([facility] [address]    Essential hypertension    Depression    Leukocytosis    AMS (altered mental status)    Morbid obesity with BMI of 50.0-59.9, adult (HCC)    Hyponatremia    Paroxysmal atrial fibrillation ([address]    Liver lesion - INCIDENTAL    Glioblastoma (HCC)     Plan:Rehab/XRT  Joana Sahni MD M.D.

## 2021-08-30 ENCOUNTER — HOSPITAL ENCOUNTER (OUTPATIENT)
Dept: RADIATION ONCOLOGY | Age: 56
Discharge: HOME | End: 2021-08-30
Payer: MEDICARE

## 2021-08-30 ENCOUNTER — APPOINTMENT (OUTPATIENT)
Dept: CT IMAGING | Age: 56
DRG: 025 | End: 2021-08-30
Attending: INTERNAL MEDICINE
Payer: MEDICARE

## 2021-08-30 DIAGNOSIS — C71.9 ASTROCYTOMA (HCC): Primary | ICD-10-CM

## 2021-08-30 LAB
ANION GAP SERPL CALCULATED.3IONS-SCNC: 13 MMOL/L (ref 7–16)
BASOPHILS ABSOLUTE: 0.04 E9/L (ref 0–0.2)
BASOPHILS RELATIVE PERCENT: 0.3 % (ref 0–2)
BUN BLDV-MCNC: 28 MG/DL (ref 6–20)
CALCIUM SERPL-MCNC: 9.6 MG/DL (ref 8.6–10.2)
CHLORIDE BLD-SCNC: 102 MMOL/L (ref 98–107)
CO2: 24 MMOL/L (ref 22–29)
CREAT SERPL-MCNC: 0.6 MG/DL (ref 0.5–1)
EOSINOPHILS ABSOLUTE: 0 E9/L (ref 0.05–0.5)
EOSINOPHILS RELATIVE PERCENT: 0 % (ref 0–6)
GFR AFRICAN AMERICAN: >60
GFR NON-AFRICAN AMERICAN: >60 ML/MIN/1.73
GLUCOSE BLD-MCNC: 190 MG/DL (ref 74–99)
HCT VFR BLD CALC: 44.5 % (ref 34–48)
HEMOGLOBIN: 14.5 G/DL (ref 11.5–15.5)
IMMATURE GRANULOCYTES #: 0.22 E9/L
IMMATURE GRANULOCYTES %: 1.6 % (ref 0–5)
LYMPHOCYTES ABSOLUTE: 1.35 E9/L (ref 1.5–4)
LYMPHOCYTES RELATIVE PERCENT: 9.8 % (ref 20–42)
MCH RBC QN AUTO: 27.2 PG (ref 26–35)
MCHC RBC AUTO-ENTMCNC: 32.6 % (ref 32–34.5)
MCV RBC AUTO: 83.3 FL (ref 80–99.9)
METER GLUCOSE: 162 MG/DL (ref 74–99)
METER GLUCOSE: 166 MG/DL (ref 74–99)
METER GLUCOSE: 245 MG/DL (ref 74–99)
METER GLUCOSE: 248 MG/DL (ref 74–99)
MONOCYTES ABSOLUTE: 0.82 E9/L (ref 0.1–0.95)
MONOCYTES RELATIVE PERCENT: 5.9 % (ref 2–12)
NEUTROPHILS ABSOLUTE: 11.38 E9/L (ref 1.8–7.3)
NEUTROPHILS RELATIVE PERCENT: 82.4 % (ref 43–80)
PDW BLD-RTO: 14.6 FL (ref 11.5–15)
PLATELET # BLD: 255 E9/L (ref 130–450)
PMV BLD AUTO: 11.9 FL (ref 7–12)
POTASSIUM SERPL-SCNC: 4.8 MMOL/L (ref 3.5–5)
RBC # BLD: 5.34 E12/L (ref 3.5–5.5)
SODIUM BLD-SCNC: 139 MMOL/L (ref 132–146)
WBC # BLD: 13.8 E9/L (ref 4.5–11.5)

## 2021-08-30 PROCEDURE — 6360000002 HC RX W HCPCS: Performed by: NEUROLOGICAL SURGERY

## 2021-08-30 PROCEDURE — 6370000000 HC RX 637 (ALT 250 FOR IP): Performed by: CLINICAL NURSE SPECIALIST

## 2021-08-30 PROCEDURE — 99999 PR OFFICE/OUTPT VISIT,PROCEDURE ONLY: CPT | Performed by: RADIOLOGY

## 2021-08-30 PROCEDURE — 85025 COMPLETE CBC W/AUTO DIFF WBC: CPT

## 2021-08-30 PROCEDURE — 6360000002 HC RX W HCPCS: Performed by: CLINICAL NURSE SPECIALIST

## 2021-08-30 PROCEDURE — 97535 SELF CARE MNGMENT TRAINING: CPT

## 2021-08-30 PROCEDURE — 6370000000 HC RX 637 (ALT 250 FOR IP): Performed by: NEUROLOGICAL SURGERY

## 2021-08-30 PROCEDURE — 6370000000 HC RX 637 (ALT 250 FOR IP): Performed by: INTERNAL MEDICINE

## 2021-08-30 PROCEDURE — 70450 CT HEAD/BRAIN W/O DYE: CPT

## 2021-08-30 PROCEDURE — 92507 TX SP LANG VOICE COMM INDIV: CPT | Performed by: SPEECH-LANGUAGE PATHOLOGIST

## 2021-08-30 PROCEDURE — 80048 BASIC METABOLIC PNL TOTAL CA: CPT

## 2021-08-30 PROCEDURE — 2500000003 HC RX 250 WO HCPCS: Performed by: CLINICAL NURSE SPECIALIST

## 2021-08-30 PROCEDURE — 2060000000 HC ICU INTERMEDIATE R&B

## 2021-08-30 PROCEDURE — 82962 GLUCOSE BLOOD TEST: CPT

## 2021-08-30 PROCEDURE — 36415 COLL VENOUS BLD VENIPUNCTURE: CPT

## 2021-08-30 PROCEDURE — 2580000003 HC RX 258: Performed by: NEUROLOGICAL SURGERY

## 2021-08-30 RX ORDER — DEXAMETHASONE SODIUM PHOSPHATE 10 MG/ML
10 INJECTION, SOLUTION INTRAMUSCULAR; INTRAVENOUS ONCE
Status: COMPLETED | OUTPATIENT
Start: 2021-08-30 | End: 2021-08-30

## 2021-08-30 RX ADMIN — LITHIUM CARBONATE 300 MG: 300 CAPSULE ORAL at 17:17

## 2021-08-30 RX ADMIN — DEXAMETHASONE SODIUM PHOSPHATE 4 MG: 4 INJECTION, SOLUTION INTRAMUSCULAR; INTRAVENOUS at 21:18

## 2021-08-30 RX ADMIN — LABETALOL HYDROCHLORIDE 10 MG: 5 INJECTION INTRAVENOUS at 21:18

## 2021-08-30 RX ADMIN — DEXAMETHASONE SODIUM PHOSPHATE 4 MG: 4 INJECTION, SOLUTION INTRAMUSCULAR; INTRAVENOUS at 16:26

## 2021-08-30 RX ADMIN — AMLODIPINE BESYLATE 5 MG: 5 TABLET ORAL at 08:12

## 2021-08-30 RX ADMIN — DEXAMETHASONE SODIUM PHOSPHATE 4 MG: 4 INJECTION, SOLUTION INTRAMUSCULAR; INTRAVENOUS at 11:08

## 2021-08-30 RX ADMIN — LABETALOL HYDROCHLORIDE 10 MG: 5 INJECTION INTRAVENOUS at 08:11

## 2021-08-30 RX ADMIN — INSULIN LISPRO 6 UNITS: 100 INJECTION, SOLUTION INTRAVENOUS; SUBCUTANEOUS at 17:17

## 2021-08-30 RX ADMIN — Medication 10 ML: at 08:13

## 2021-08-30 RX ADMIN — LEVETIRACETAM 750 MG: 500 SOLUTION ORAL at 21:18

## 2021-08-30 RX ADMIN — LITHIUM CARBONATE 300 MG: 300 CAPSULE ORAL at 11:43

## 2021-08-30 RX ADMIN — LITHIUM CARBONATE 300 MG: 300 CAPSULE ORAL at 08:12

## 2021-08-30 RX ADMIN — BACITRACIN ZINC, NEOMYCIN, POLYMYXIN B SULFAT: 5000; 3.5; 4 OINTMENT TOPICAL at 21:18

## 2021-08-30 RX ADMIN — DEXAMETHASONE SODIUM PHOSPHATE 10 MG: 10 INJECTION, SOLUTION INTRAMUSCULAR; INTRAVENOUS at 11:43

## 2021-08-30 RX ADMIN — CARVEDILOL 6.25 MG: 6.25 TABLET, FILM COATED ORAL at 08:12

## 2021-08-30 RX ADMIN — INSULIN LISPRO 3 UNITS: 100 INJECTION, SOLUTION INTRAVENOUS; SUBCUTANEOUS at 21:33

## 2021-08-30 RX ADMIN — LITHIUM CARBONATE 300 MG: 300 CAPSULE ORAL at 21:18

## 2021-08-30 RX ADMIN — CARVEDILOL 6.25 MG: 6.25 TABLET, FILM COATED ORAL at 17:17

## 2021-08-30 RX ADMIN — LEVETIRACETAM 750 MG: 500 SOLUTION ORAL at 08:12

## 2021-08-30 RX ADMIN — HYDRALAZINE HYDROCHLORIDE 10 MG: 20 INJECTION INTRAMUSCULAR; INTRAVENOUS at 05:33

## 2021-08-30 RX ADMIN — Medication 10 ML: at 21:26

## 2021-08-30 RX ADMIN — INSULIN LISPRO 3 UNITS: 100 INJECTION, SOLUTION INTRAVENOUS; SUBCUTANEOUS at 11:43

## 2021-08-30 RX ADMIN — DEXAMETHASONE SODIUM PHOSPHATE 4 MG: 4 INJECTION, SOLUTION INTRAMUSCULAR; INTRAVENOUS at 03:13

## 2021-08-30 RX ADMIN — INSULIN LISPRO 3 UNITS: 100 INJECTION, SOLUTION INTRAVENOUS; SUBCUTANEOUS at 08:11

## 2021-08-30 RX ADMIN — BACITRACIN ZINC, NEOMYCIN, POLYMYXIN B SULFAT: 5000; 3.5; 4 OINTMENT TOPICAL at 08:13

## 2021-08-30 RX ADMIN — PAROXETINE 40 MG: 20 TABLET, FILM COATED ORAL at 08:12

## 2021-08-30 ASSESSMENT — PAIN SCALES - GENERAL
PAINLEVEL_OUTOF10: 0
PAINLEVEL_OUTOF10: 0

## 2021-08-30 NOTE — PROGRESS NOTES
OCCUPATIONAL THERAPY TREATMENT NOTE    [address] [address] [address]                                                               Patient Name: Lyssa Leon  MRN: [id number]  : 1965  Room: [address] [place] [address]-A    Per eval:  Evaluating OT: Donelda Meckel, OTR/L 1515     Referring Provider: SKY Crystal - CNS   Specific Provider Orders/Date: OT eval and treat (21)     Diagnosis: Brain mass    Reason for admission: AMS & emesis; expressive aphasia     Surgery/Procedures:  L temporoparietal craniotomy    Pertinent Medical History: Asthma, Bipolar, DM, HTN, Thyroid disease      *Precautions:  Fall Risk, bed alarm, , aphasia, R hemiparesis and inattention, SBP<140, puree/honey thick dysphagia diet per chart     Assessment of current deficits   [x]? Functional mobility          [x]? ADLs           [x]? Strength                  [x]? Cognition   [x]? Functional transfers        [x]? IADLs         [x]? Safety Awareness   [x]? Endurance   [x]? Fine Coordination           [x]? ROM           [x]? Vision/perception    [x]? Sensation     [x]? Gross Motor Coordination [x]? Balance    [x]? Delirium                  [x]? Motor Control     [x]?  Communication     OT PLAN OF CARE   OT POC based on physician orders, patient diagnosis and results of clinical assessment.        Frequency/Duration: 1-3 days/wk for 1-2 weeks PRN    Specific OT Treatment to include:   ADL retraining/adapted techniques and AE recommendations to increase functional independence within precautions                    Energy conservation techniques to improve tolerance for selfcare routine   Functional transfer/mobility training/DME recommendations for increased independence, safety and fall prevention         Patient/family education to increase safety and functional independence within precautions              Environmental modifications for safe mobility and completion of ADLs                           Cognitive retraining ex's to improve problem solving skills & safe participation in ADLs/IADLs  Sensory re-education techniques to improve extremity awareness, maintain skin integrity and improve hand function                             Visual/Perceptual retraining  to improve body awareness and safety during transfers and ADLs  Splinting/positioning needs to maintain joint/skin integrity and prevent contracture  Therapeutic activity to improve functional performance during ADLs/IADLs                                         Therapeutic exercise to improve tolerance and functional strength for ADLs   Balance retraining exercises/tasks for facilitation of postural control with dynamic challenges during ADLs . Positioning to improve functional independence  Neuromuscular re-education: facilitation of righting/equilibrium reactions, normalization muscle tone/facilitation active functional movement                      Delirium prevention/treatment     Modified Crenshaw Scale   Score     Description  0             No symptoms  1             No significant disability despite symptoms  2             Slight disability; able to look after own affairs  3             Moderate disability; able to ambulate without assist/ requires assist with ADLs  4             Moderate/Severe disability;requires assist to ambulate/assist with ADLs  5             Severe disability;bedridden/incontinent   6               Score:   5     Recommended Adaptive Equipment: TBA      Home Living: Per chart, pt lives with   in a 1 floor plan with 1 step(s) to enter. Pt cannot report due to aphasia. Bathroom setup: unknown  Equipment owned: unknown     Prior Level of Function: IND with ADLs;  IND with IADLs. No device for ambulation.      Pain Level: 0/10     Cognition: A&O: 1/4 (self)             Memory: difficult to assess             Comprehension fair- simple instruction.  Pt marielena increased R sided inattention today during UB ADLS. Problem solving: poor+             Judgement/safety: poor                Communication skills: poor+ due to aphasia. Pt with occasional clear responses. Words mostly incomprehensible. Vision: impaired tracking to R upper field; finger ID grossly appropriate. Glasses:not present in room                                                       Hearing: grossly WFL               RASS: 0  CAM-ICU: (NT) Delirium- unable to assess due to aphasia     UE Assessment:  Hand Dominance: Right []? Left []? Pt not able to report       ROM Strength STM goal: PRN   RUE  PROM WFL; gross shoulder flexion/elbow flexion and weak grasp observed. Pt does not comprehend ROM testing. 2-/5 shoulder  3-/5 elbow  3-/5 hand     *impaired [facility] skills    8/27- noted decreased purposeful movement R UE this date; impaired attention WFL; 3+/5      LUE PROM WFL     A. AROM: grossly WFL with impaired [facility] 3-/5 proximal  3+/5 distal WFL for ADLS; 4-/5         Sensation:unable to assess  Tone: WFL  Edema: min to moderate edema R UE (greater in hand)     Functional Assessment:  AM-PAC Daily Activity Raw Score: 9/24    Initial Eval Status  Date: 8/26 Treatment Status  Date:8/27 STGs = LTGs  Time frame: 7-14 days   Feeding NT Mod A (sitting up in chair, vc's to decrease spillage and use of L hand due to R hand weakness)                       Min A  while seated up in chair to increase activity tolerance; AE as needed to complete tasks         Grooming Max A  with [place] assist to wash face; hands- pt required max cues to initiate and end tasks; encouraged use of R UE during tasks with fair- success.  Max A (completed oral hygiene seated EOB, assist due to R handed weakness to open toothpaste and kimberlee on toothbrush)  .                        Min A   while seated supported; using R UE as fair functional assist & demonstrating G body awareness.      UB dressing/bathing Dep UB dressing: Max A (due to decreased proprioception)    UB bathing: Max A (sponge bath seated EOB with vc's for sequencing)                       Mod A  demo fair awareness of miguel techniques.         LB dressing/bathing Dep LB dressing: dep (2 person assist for brief)    LB bathing: dep (2 person assist for shila areas, below knees, vc's for sequencing, and standing balance)                       Max A   using AE as needed for safe reach/ energy conservation        Toileting Dep Dep (2 person assist, incontinent)                             Bed Mobility  Supine to sit: Dep+2     Sit to supine: Dep+2 Supine-sit EOB: Max A                       Mod A  in prep of ADL tasks & transfers   Functional Transfers Sit to stand: NT     Stand to sit: NT Max A                       Max A  sit<>stand/functional bathroom transfers using AD/DME as needed for balance and safety   Functional Mobility NT NT                      Max A   functional/bathroom mobility using AD as needed & demonstrating F safety      Balance Sitting:     Static:  Max A improving to Mod A    Dynamic:Max A  Standing: NT Sitting: SBA  Standing: Max A Min A dynamic sitting balance; Max A dynamic standing balance  during ADL tasks & transfers   Endurance/Activity Tolerance    F tolerance with light activity. Tolerated sitting EOB >8 min with activity. fair F   tolerance with moderate activity/self care routine   Visual/  Perceptual Impaired: visual tracking to midline; L gaze with R sided inattention. R/L discrimination and R sided body awareness.                          Treatment: Cleared by RN to see pt. Completed ADLs/functional transfers, see above for assessment. Pt required vc's and physical assist for proper technique/safety with hand placement/body mechanics/posture for bed mobility/ADLs/functional tranfers. Pt required vc's for sequencing/initiation of ADLs/functional transfers.  Pt requried hand over hand guidance due to decreased proprioception of RUE. Pt educated on miguel dressing technique. Pt able to  sit EOB ~30 mins to increase core strength/balance/activity tolerance for ease with ADLs. Pt required increased time to complete ADLs/functional transfers due to rest breaks and repetition of commands. Max A for SPT bed-chair. Pt appeared to have tolerated session well and appears cooperative/pleasant . Pt's limiting factors appear to be her cognition, R hemiparesis, balance, strength, coordination, and activity tolerance. Pt instructed on use of call light for assistance and fall prevention. Pt demo'ing fair understanding of education provided. Continue to educate. Comments: Upon arrival to , pt supine in bed and agreeable to OT session. At end of session, pt sitting in chair, call light and RN in reach. · Pt has made fair  progress towards set goals.    · Continue with current plan of care       Time In:   1110         Time Out: 1155        Total Treatment Time:45       Treatment Charges: Mins Units   Ther Ex  01525     Manual Therapy [phone]     Thera Activities [id number]     ADL/Home Mgt [id number] 45 3   Neuro Re-ed 78835     Orthotic manage/training  [id number]     Non-Billable Time       JUSTYNA Riley, OTR/L [id number]

## 2021-08-30 NOTE — PLAN OF CARE
Problem: Discharge Planning:  Goal: Discharged to appropriate level of care  Description: Discharged to appropriate level of care  Outcome: Met This Shift     Problem: Verbal Communication - Impaired:  Goal: Functional communication will improve  Description: Functional communication will improve  Outcome: Ongoing  Goal: Ability to interact with others will improve  Description: Ability to interact with others will improve  Outcome: Ongoing  Goal: Ability to establish a method of communication will improve  Description: Ability to establish a method of communication will improve  Outcome: Ongoing     Problem: Pain:  Goal: Pain level will decrease  Description: Pain level will decrease  Outcome: Met This Shift  Goal: Recognizes and communicates pain  Description: Recognizes and communicates pain  Outcome: Met This Shift     Problem: Mobility - Impaired:  Goal: Able to ambulate independently  Description: Able to ambulate independently  Outcome: Met This Shift  Goal: Able to ambulate with minimal assistance  Description: Able to ambulate with minimal assistance  Outcome: Met This Shift  Goal: Ability to appropriately use an adaptive device for ambulation will improve  Description: Ability to appropriately use an adaptive device for ambulation will improve  Outcome: Met This Shift  Goal: Able to verbalize acceptance of life and situations over which he or she has no control  Description: Absence of contracture deformity  Outcome: Met This Shift     Problem: Injury - Risk of, Healthcare-Acquired Condition:  Goal: Absence of healthcare acquired conditions  Description: Absence of healthcare acquired conditions  Outcome: Met This Shift  Goal: Will remain free from falls  Description: Will remain free from falls  Outcome: Met This Shift  Goal: Absence of pressure ulcer  Description: Absence of pressure ulcer  Outcome: Met This Shift  Goal: Demonstration of wound healing without infection will improve  Description:

## 2021-08-30 NOTE — CARE COORDINATION
SOCIAL WORK/CASEMANAGEMENT TRANSITION OF CARE [id number] Brie Armas, [address], Becky Riley, -[phone]): Loni Dempsey the daughter in law called this sw to day, [phone] and she wants #1 edwardo, #2 blossom and #3 [place] of care. She said that family has gone over this multiple times as well as code status and pt is full code. Referral made to edwardo and elver to assess and follow up with grant the huma and karyn the cm today on [address]. Delilah Burnham, MARYCRUZ  8/30/2021

## 2021-08-30 NOTE — PROGRESS NOTES
Radiation Oncology          -see IP note        Neptali Richard.  MD Fort[place] Linton  Oncology  Cell: [phone]    [facility]:  [phone]   FAX: [phone] [address] [facility]:  [address] [place] [address] Avenue:    [phone]  [address] [place] [address] Road:  [address] [place] [address] Road:  [phone]

## 2021-08-30 NOTE — PROGRESS NOTES
Dr Xu Marsh notified that pt neuro stat changed pt started experiencing word salad. CT head ordered.

## 2021-08-30 NOTE — CARE COORDINATION
Left VM for Dtr-in-law Sandra Eduar about [facility] and costs with return number.    Debbie Fitzgerald, L.S.W.  [phone]

## 2021-08-30 NOTE — CARE COORDINATION
Received return call from JOHN Cruz. Choice is [facility]. Family wants to try to take Pt home wand will need equipment. Family also interested in the House for Respite care. Referral made to [facility] [place] [address] referral.  Discharge plan is to home with [facility]. SARAH/HINA to follow for discharge needs.    Kusum Crook, L.S.W.  [phone]

## 2021-08-30 NOTE — PATIENT INSTRUCTIONS
LEI Castellanos. Ronak Marie MD [place] [address] Oncology  Cell: [phone]    [facility]:  [phone]   FAX: [phone] 101 e [facility]:  [address] [place] [address] Avenue:    [phone]  [address] [place] [address] Road:  [phone]   FAX:  [phone]  Email: [email or web address]

## 2021-08-30 NOTE — PROGRESS NOTES
Liaison Informational Visit Note      Referral received from [facility]             Patient Name: Todd Palomo   :  1965  MRN:  [id number]    Admit date:  2021      Hospital Admitting Physician:  Mary Tam MD   PCP:  Nish Ackerman MD    Primary Insurance: Payor: Britt Gusman /  /  /      Emergency Contact:      Contact/Relation:   /         Phone:         Advance Directive  Advance directives received No  Patient does not have a documented healthcare surrogate  Discussed with: Family member  DPOA-HC Name-Relation:    Phone:       Terminal Diagnosis Glioblastoma as confirmed by Dr. Mary Gilmore, [address] Problem List:   Patient Active Problem List   Diagnosis Code    Morbid obesity ([facility] E66.01    Brain mass G93.89    Brain bleed ([facility] I61.9    Essential hypertension I10    Depression F32.9    Leukocytosis D72.829    AMS (altered mental status) R41.82    Morbid obesity with BMI of 50.0-59.9, adult ([facility] E66.01, Z68.43    Hyponatremia E87.1    Paroxysmal atrial fibrillation (HCC) I48.0    Liver lesion - INCIDENTAL K76.9    [facility] ([facility] C71.9       Code Status Order: DNR-CC    Allergies:  Clindamycin/lincomycin, Iodine, Penicillins, and Sulfa antibiotics    Family Goal: Comfort care    Meeting held with saba with GURPREET Parker over the phone. Referral received. Chart reviewed. Spoke with Tung Mota. Explained hospice philosophy and no longer pursuing any further aggressive treatment. Focusing on comfort care only. Explained hospice benefit and reviewed all levels of care including the hospice house for short term symptom management. Once symptoms are managed, patient would transfer to a routine level of care either home or ECF with hospice. Discussed roles and frequency visits of skilled nursing, personal care team and Eliodoro Nageotte expressed that family can not afford ECF placement. She will speak with family this evening and would like a follow up call tomorrow.  Answered all questions. Emotional support and active listening provided. [facility] plan:  1. Will follow up tomorrow. Sunil Horvath will speak with rest of family today about hospice services. 2. Hospice is not managing patient's care presently. 3. [facility] any questions at [phone].     Discharge Plan:  Discharge Disposition; Undecided      Electronically signed by Violette Alarcon RN on 8/30/2021 at 3:55 PM

## 2021-08-30 NOTE — PROGRESS NOTES
Chief Complaint:  Glioblastoma ([address]     Subjective:    Awake, conversant, quite aphasic but stable compared to last several days. No new complaints. Objective:    BP (!) 143/80   Pulse 79   Temp 96.8 °F (36 °C) (Temporal)   Resp 18   Ht 5' 4\" (1.626 m)   Wt (!) 334 lb 10.5 oz (151.8 kg)   SpO2 95%   BMI 57.44 kg/m²     Current medications that patient is taking have been reviewed. General appearance: Nontoxic, morbidly obese  HEENT: dressing on head, MMM  Neck: FROM, supple  Lungs: Clear to auscultation anteriorly, WOB normal  CV: RRR, no MRGs  Abdomen: Soft, non-tender; no masses or HSM, +BS  Extremities: No peripheral edema or digital cyanosis  Skin: no rash, lesions or ulcers  Psych: Calm and cooperative  Neuro: Alert and interactive, face symmetric, moderately aphasic.   R handgrip weaker than other muscle groups, but can move all extremities weakly    Labs:  CBC with Differential:    Lab Results   Component Value Date    WBC 13.8 08/30/2021    RBC 5.34 08/30/2021    RBC 4.68 04/26/2017    HGB 14.5 08/30/2021    HCT 44.5 08/30/2021     08/30/2021    MCV 83.3 08/30/2021    MCH 27.2 08/30/2021    MCHC 32.6 08/30/2021    RDW 14.6 08/30/2021    LYMPHOPCT 9.8 08/30/2021    MONOPCT 5.9 08/30/2021    BASOPCT 0.3 08/30/2021    MONOSABS 0.82 08/30/2021    LYMPHSABS 1.35 08/30/2021    EOSABS 0.00 08/30/2021    BASOSABS 0.04 08/30/2021     CMP:    Lab Results   Component Value Date     08/30/2021    K 4.8 08/30/2021    K 4.7 08/21/2021     08/30/2021    CO2 24 08/30/2021    BUN 28 08/30/2021    CREATININE 0.6 08/30/2021    GFRAA >60 08/30/2021    LABGLOM >60 08/30/2021    GLUCOSE 190 08/30/2021    PROT 6.2 08/28/2021    LABALBU 3.5 08/28/2021    CALCIUM 9.6 08/30/2021    BILITOT 0.9 08/28/2021    ALKPHOS 26 08/28/2021    AST 70 08/28/2021     08/28/2021            Assessment/Plan:  Principal Problem:    Glioblastoma ([facility]  Active Problems:    Brain mass    Brain bleed ([facility] Essential hypertension    Depression    Leukocytosis    AMS (altered mental status)    Morbid obesity with BMI of 50.0-59.9, adult (HCC)    Hyponatremia    Paroxysmal atrial fibrillation (HCC)    Liver lesion - INCIDENTAL  Resolved Problems:    * No resolved hospital problems.  *       Unfortunately path shows GBM    Rad/onc saw her    Heme/onc consult pending though doesn't need to hold up d/c planning    Continue metoprolol for PAF    BP better today    Continue Keppra    Medically ready for d/c  Bernardo Barker MD    12:35 PM   8/30/2021

## 2021-08-30 NOTE — PROGRESS NOTES
Palliative Medicine   Progression of Care     Patient's chart reviewed . Patient reviewed with Hospice Liaison. Will follow up tomorrow to see what the family and patient have decided. Patient was changed to a DNR-CC by Neurosurgery.        Palliative Care following closely for support of patient and family   Karn Collet APRN-CNP, AGACNP-BC

## 2021-08-30 NOTE — PROGRESS NOTES
Patient lost speech last pm, head ct noncontrast documents worsening edema and mass effect, patient given decadron 10 and is sitting upright in chair  Pathology confirms GBM  Family and patient prefers hospice and no furthter surgery, I agree  Will consult hospice and cancel XRT consult

## 2021-08-30 NOTE — PROGRESS NOTES
SPEECH LANGUAGE PATHOLOGY  DAILY PROGRESS NOTE        PATIENT NAME:  Brayan Headings      :  1965          TODAY'S DATE:  2021 ROOM:  8507/8507-A    Pt seen for ongoing aphasia intervention. More alert today. Aphasia continues- seemingly consistent with Wernicke's at this time. Pt has periods of comprehensible speech for familiar phrases (\"not right now\", \"maybe later\", etc) however auto phrases, response to Wh- questions resulting in speech characterized by fluent jargon, semantic and phonemic paraphasias. Pt fatigued, communicated she did not want to complete tx right now. Discussed briefly with , no questions at this time.        CPT code(s) X120824  speech/language tx  Total minutes :  10 minutes

## 2021-08-30 NOTE — PROGRESS NOTES
Nutrition Assessment     Type and Reason for Visit: Reassess    Nutrition Recommendations/Plan: Continue Current Diet  Start Boost pudding BID    Nutrition Assessment:  Pt w/ Glioblastoma s/p crani w/ bx. Hx DM. Intakes ~50% at meals w/ altered consistencies. Will add ONS. Malnutrition Assessment:  Malnutrition Status: At risk for malnutrition (Comment)    Estimated Daily Nutrient Needs:  Energy (kcal):  (1.2 SF); Weight Used for Energy Requirements:  Current     Protein (g): 120-140 (2.2-2.5); Weight Used for Protein Requirements:  Ideal        Fluid (ml/day): per neuro management; Weight Used for Fluid Requirements:  Other (Comment)      Nutrition Related Findings: expressive aphasia, -I&Os, +1-+2 pitting edema, abd soft, BS active, no N/V noted at this time      Current Nutrition Therapies:    ADULT DIET; Dysphagia - Pureed; Moderately Thick (Honey)    Anthropometric Measures:  · Height: 5' 4\" (162.6 cm)  · Current Body Wt: 334 lb (151.5 kg) (8/30)   · BMI: 57.3    Nutrition Diagnosis:   · Inadequate oral intake related to cognitive or neurological impairment as evidenced by intake 26-50%, swallow study results    Nutrition Interventions:   Nutrition Education/Counseling:  Education not appropriate   Coordination of Nutrition Care:  Continue to monitor while inpatient    Goals:  Pt to consume >50% of meals and supplements       Nutrition Monitoring and Evaluation:   Food/Nutrient Intake Outcomes:  Food and Nutrient Intake, Supplement Intake  Physical Signs/Symptoms Outcomes:  Biochemical Data, Chewing or Swallowing, GI Status, Fluid Status or Edema, Nutrition Focused Physical Findings, Skin, Weight     Discharge Planning:     Too soon to determine     Electronically signed by Edith Woodruff RD, LD on 8/30/21 at 10:56 AM EDT    Contact: [phone]

## 2021-08-30 NOTE — CARE COORDINATION
Received call from JOHN Real who is the  . Violet Real speaks with  and Son then gives decisions. Emailed [email or web address] list of Hospice Providers. Spoke briefly with  who informed SW that family can not afford the cost of room and board for Pt to go to [facility] with Hospice care. Explained briefly about [employer]. Family is considering taking Pt home with Hopsice.    Shirin Garcia, L.S.W.  [phone]

## 2021-08-30 NOTE — PATIENT INSTRUCTIONS
Consult as OP      Gene Hale. Shelton Smith MD [place] [address] Oncology  Cell: [phone]    [facility]:  [phone]   FAX: [phone]  [facility]:  [address] [place] [address] Avenue:    [phone]  [address] [place] [address] Road:  [phone]   FAX:  [phone]  Email: [email or web address]

## 2021-08-30 NOTE — CONSULTS
Radiation Oncology      DOS 8/27/21 PM    -chart reviewed  -imaging reviewed  -prelim PATH reviewed  -pt seen in SICU at 5 PM FRI, somnolent  -I spoke with the pt and left my card with office number and cell  -rec post OP MRI with and without contrast for Tx planning      -probable RT post Op (defer systemic recs to [address]) + [employer]    -will sched as OP for full consultation    ---N/C        Nick Washington.  Porsha Hatch MD [place] [address] Oncology  Cell: [phone]    [facility]:  [phone]   FAX: [phone]  [address]:  [address] [place] [address] Avenue:    [phone]  [address] [place] [address] Road:  [address] [place] [address] Road:  [phone]

## 2021-08-31 VITALS
BODY MASS INDEX: 50.02 KG/M2 | HEART RATE: 98 BPM | DIASTOLIC BLOOD PRESSURE: 90 MMHG | TEMPERATURE: 96.6 F | OXYGEN SATURATION: 98 % | SYSTOLIC BLOOD PRESSURE: 151 MMHG | RESPIRATION RATE: 18 BRPM | HEIGHT: 64 IN | WEIGHT: 293 LBS

## 2021-08-31 LAB
ANION GAP SERPL CALCULATED.3IONS-SCNC: 13 MMOL/L (ref 7–16)
BASOPHILS ABSOLUTE: 0.02 E9/L (ref 0–0.2)
BASOPHILS RELATIVE PERCENT: 0.1 % (ref 0–2)
BUN BLDV-MCNC: 37 MG/DL (ref 6–20)
CALCIUM SERPL-MCNC: 10.1 MG/DL (ref 8.6–10.2)
CHLORIDE BLD-SCNC: 105 MMOL/L (ref 98–107)
CO2: 21 MMOL/L (ref 22–29)
CREAT SERPL-MCNC: 0.7 MG/DL (ref 0.5–1)
EOSINOPHILS ABSOLUTE: 0 E9/L (ref 0.05–0.5)
EOSINOPHILS RELATIVE PERCENT: 0 % (ref 0–6)
GFR AFRICAN AMERICAN: >60
GFR NON-AFRICAN AMERICAN: >60 ML/MIN/1.73
GLUCOSE BLD-MCNC: 219 MG/DL (ref 74–99)
HCT VFR BLD CALC: 43.6 % (ref 34–48)
HEMOGLOBIN: 13.9 G/DL (ref 11.5–15.5)
IMMATURE GRANULOCYTES #: 0.19 E9/L
IMMATURE GRANULOCYTES %: 1.3 % (ref 0–5)
LYMPHOCYTES ABSOLUTE: 1.14 E9/L (ref 1.5–4)
LYMPHOCYTES RELATIVE PERCENT: 7.7 % (ref 20–42)
MCH RBC QN AUTO: 26.4 PG (ref 26–35)
MCHC RBC AUTO-ENTMCNC: 31.9 % (ref 32–34.5)
MCV RBC AUTO: 82.9 FL (ref 80–99.9)
METER GLUCOSE: 186 MG/DL (ref 74–99)
METER GLUCOSE: 196 MG/DL (ref 74–99)
METER GLUCOSE: 215 MG/DL (ref 74–99)
MONOCYTES ABSOLUTE: 0.66 E9/L (ref 0.1–0.95)
MONOCYTES RELATIVE PERCENT: 4.5 % (ref 2–12)
NEUTROPHILS ABSOLUTE: 12.78 E9/L (ref 1.8–7.3)
NEUTROPHILS RELATIVE PERCENT: 86.4 % (ref 43–80)
PDW BLD-RTO: 14.7 FL (ref 11.5–15)
PLATELET # BLD: 225 E9/L (ref 130–450)
PMV BLD AUTO: 12.3 FL (ref 7–12)
POTASSIUM SERPL-SCNC: 4.3 MMOL/L (ref 3.5–5)
RBC # BLD: 5.26 E12/L (ref 3.5–5.5)
SODIUM BLD-SCNC: 139 MMOL/L (ref 132–146)
WBC # BLD: 14.8 E9/L (ref 4.5–11.5)

## 2021-08-31 PROCEDURE — 92507 TX SP LANG VOICE COMM INDIV: CPT | Performed by: SPEECH-LANGUAGE PATHOLOGIST

## 2021-08-31 PROCEDURE — 36415 COLL VENOUS BLD VENIPUNCTURE: CPT

## 2021-08-31 PROCEDURE — 6370000000 HC RX 637 (ALT 250 FOR IP): Performed by: NEUROLOGICAL SURGERY

## 2021-08-31 PROCEDURE — 6370000000 HC RX 637 (ALT 250 FOR IP): Performed by: CLINICAL NURSE SPECIALIST

## 2021-08-31 PROCEDURE — 80048 BASIC METABOLIC PNL TOTAL CA: CPT

## 2021-08-31 PROCEDURE — 97530 THERAPEUTIC ACTIVITIES: CPT

## 2021-08-31 PROCEDURE — 6360000002 HC RX W HCPCS: Performed by: NEUROLOGICAL SURGERY

## 2021-08-31 PROCEDURE — 2580000003 HC RX 258: Performed by: NEUROLOGICAL SURGERY

## 2021-08-31 PROCEDURE — 6370000000 HC RX 637 (ALT 250 FOR IP): Performed by: INTERNAL MEDICINE

## 2021-08-31 PROCEDURE — 85025 COMPLETE CBC W/AUTO DIFF WBC: CPT

## 2021-08-31 PROCEDURE — 82962 GLUCOSE BLOOD TEST: CPT

## 2021-08-31 RX ORDER — PANTOPRAZOLE SODIUM 40 MG/1
40 TABLET, DELAYED RELEASE ORAL
Qty: 30 TABLET | Refills: 1 | Status: SHIPPED | OUTPATIENT
Start: 2021-08-31

## 2021-08-31 RX ORDER — CARVEDILOL 6.25 MG/1
6.25 TABLET ORAL 2 TIMES DAILY WITH MEALS
Qty: 60 TABLET | Refills: 1 | Status: SHIPPED | OUTPATIENT
Start: 2021-08-31

## 2021-08-31 RX ORDER — DEXAMETHASONE 4 MG/1
4 TABLET ORAL 4 TIMES DAILY
Qty: 56 TABLET | Refills: 1 | Status: SHIPPED | OUTPATIENT
Start: 2021-08-31 | End: 2021-09-14

## 2021-08-31 RX ORDER — LEVETIRACETAM 500 MG/1
750 TABLET ORAL 2 TIMES DAILY
Qty: 90 TABLET | Refills: 1 | Status: SHIPPED | OUTPATIENT
Start: 2021-08-31

## 2021-08-31 RX ADMIN — LEVETIRACETAM 750 MG: 500 SOLUTION ORAL at 08:34

## 2021-08-31 RX ADMIN — BACITRACIN ZINC, NEOMYCIN, POLYMYXIN B SULFAT: 5000; 3.5; 4 OINTMENT TOPICAL at 08:40

## 2021-08-31 RX ADMIN — DEXAMETHASONE SODIUM PHOSPHATE 4 MG: 4 INJECTION, SOLUTION INTRAMUSCULAR; INTRAVENOUS at 03:09

## 2021-08-31 RX ADMIN — Medication 10 ML: at 08:33

## 2021-08-31 RX ADMIN — DEXAMETHASONE SODIUM PHOSPHATE 4 MG: 4 INJECTION, SOLUTION INTRAMUSCULAR; INTRAVENOUS at 08:37

## 2021-08-31 RX ADMIN — LITHIUM CARBONATE 300 MG: 300 CAPSULE ORAL at 17:04

## 2021-08-31 RX ADMIN — INSULIN LISPRO 3 UNITS: 100 INJECTION, SOLUTION INTRAVENOUS; SUBCUTANEOUS at 08:40

## 2021-08-31 RX ADMIN — LITHIUM CARBONATE 300 MG: 300 CAPSULE ORAL at 08:34

## 2021-08-31 RX ADMIN — INSULIN LISPRO 6 UNITS: 100 INJECTION, SOLUTION INTRAVENOUS; SUBCUTANEOUS at 17:06

## 2021-08-31 RX ADMIN — CARVEDILOL 6.25 MG: 6.25 TABLET, FILM COATED ORAL at 08:34

## 2021-08-31 RX ADMIN — CARVEDILOL 6.25 MG: 6.25 TABLET, FILM COATED ORAL at 17:04

## 2021-08-31 RX ADMIN — LITHIUM CARBONATE 300 MG: 300 CAPSULE ORAL at 12:06

## 2021-08-31 RX ADMIN — INSULIN LISPRO 3 UNITS: 100 INJECTION, SOLUTION INTRAVENOUS; SUBCUTANEOUS at 11:27

## 2021-08-31 RX ADMIN — PAROXETINE 40 MG: 20 TABLET, FILM COATED ORAL at 08:34

## 2021-08-31 RX ADMIN — AMLODIPINE BESYLATE 5 MG: 5 TABLET ORAL at 08:34

## 2021-08-31 ASSESSMENT — PAIN SCALES - GENERAL
PAINLEVEL_OUTOF10: 0

## 2021-08-31 NOTE — CONSULTS
[address]                  [address] [facility], [address]                                  CONSULTATION    PATIENT NAME: Sushila Sarabia                     :        1965  MED REC NO:   [id number]                            ROOM:       8507  ACCOUNT NO:   [de-identified]                           ADMIT DATE: 2021  PROVIDER:     Tammi Merlin, MD    CONSULT DATE:  2021    REASON FOR CONSULTATION:  Left posterior temporal, anterior parietal  brain mass. HISTORY OF PRESENT ILLNESS:  This is a 57-year-old female who is  morbidly obese. She has multiple medical problems, diabetes type 2,  bipolar 1 depression, who has been on insulin long-term presented with  headaches, expressive aphasia which prompted obtaining an image  documenting the presence of a large basal occupying mass in the left  temporal and anterior parietal region _____ mass effect of 5 mm of  shift, so she was started on Decadron, Keppra and neurosurgical  consultation was obtained. Her admitting H and P was reviewed and will  not be reiterated. PAST MEDICAL HISTORY:  Significant for her morbid obesity, she weighs  334 pounds with a BMI of 57; bipolar 1 disorder; asthma; anemia;  insulin-dependent diabetes; hypertension; stress incontinence; thyroid  disease. PAST SURGICAL HISTORY:  , dilation and curettage of her uterus,  hysterectomy, tonsillectomy. MEDICATIONS PRIOR TO ADMISSION:  Included Norvasc, Atarax, vitamin B12,  vitamin D, melatonin, Paxil, lithium, albuterol inhaler. ALLERGIES:  She is allergic to CLINDAMYCIN, LINCOMYCIN, IODINE,  PENICILLIN, and SULFA. SOCIAL HISTORY:  Does not smoke. She is , lives at home with her  . No use of alcohol. FAMILY HISTORY:  Diabetes in mom, stroke in mom, diabetes in father,  breast cancer in paternal grandmother. REVIEW OF SYSTEMS:  GENERAL:  Significant for generalized fatigue. HEMATOLOGIC/LYMPHATIC:  Negative. ENDOCRINE:  Negative. RESPIRATORY:   Denied shortness of breath, chest pain or wheezing. CARDIOVASCULAR:  No  chest pain or exertional shortness of breath. GASTROINTESTINAL:  No  change in bowel habits. No melena or hematochezia. GENITOURINARY:  No  difficulty voiding or hematuria. NEUROLOGIC:  Difficulty with speech  and word finding and some subtle confusion. PHYSICAL EXAMINATION:  VITALS:  /106, pulse 93, temperature 98.6, respirations 16. GENERAL:  She is awake. She has a partial aphasia, difficulty with word  finding and has a right-sided weakness 4+/5 with some pronator drift. Pupils equal, round, reactive. Extraocular movement is full. Facial  expressions were actually symmetric. DIAGNOSES:  Large left posterior temporal, anterior parietal mass with  shift to midline and a large cystic structure associated with it, also  appears to have a mural nodule. I counseled the family extensively  greater than 50% of the encounter time including the sister, the   and the patient herself. We will do CT scans of the chest, abdomen and  pelvis. We will try to get a CT of her head with contrast.  She is  allergic to IODINE, so we will have to follow our routine IODINE allergy  protocol. I tried to answer all of their questions. She is likely  going to need frameless stereotactic biopsy, but we will await the  workup and all the labs.         Luz Marina Marquez MD    D: 08/31/2021 14:13:34       T: 08/31/2021 14:18:12     [email or web address]  Job#: [id number]     Doc#: [id number]    CC:

## 2021-08-31 NOTE — PROGRESS NOTES
Physical Therapy  Treatment Note    Name: Faina Daniel  : 1965  MRN: [id number]      Date of Service: 2021    Evaluating PT:  Juan Pablo Lindquist, PT, DPT [id number]    Room #:  6027/4423-T  Diagnosis:  Brain mass [G93.89]  PMHx/PSHx:  Asthma, Bipolar, DM, HTN  Procedure/Surgery:   L temporoparietal craniotomy   Precautions:  Falls, ICP monitor, aphasia, R hemiparesis, SBP < 140  Equipment Needs:  TBD    SUBJECTIVE:    Pt lives with  in a 1 story home with 1 stairs to enter, per chart. Pt ambulated without device and was independent PTA. OBJECTIVE:   Initial Evaluation  Date: 21 Treatment  2021 Short Term/ Long Term   Goals   AM-PAC 6 Clicks  72/76    Was pt agreeable to Eval/treatment? Yes Yes    Does pt have pain? No signs of pain No signs of pain    Bed Mobility  Rolling: NT  Supine to sit: Dep x 2 with HOB elevated  Sit to supine: Dep x 2  Scooting: Dep Rolling: modA  Supine to sit: ModA  Sit to supine: NT  Scooting: Jayy ModA   Transfers Sit to stand: NT  Stand to sit: NT  Stand pivot: NT Sit to stand: ModA  Stand to sit: ModA  Stand pivot: Jayy front [employer] ModA with AAD   Ambulation   NT 15 feet front [employer] modA >25 feet with ModA with AAD   Stair negotiation: ascended and descended NT NT >2 steps with 1 rail ModA   ROM BUE:  Defer to OT note  BLE:  WFL     Strength BUE:  Defer to OT note  LLE:  3+ to 4-/5 grossly  RLE:  3 to 3+/5 grossly  Increase by 1/3 MMT grade   Balance Sitting EOB:  MaxA to [address]  Dynamic Standing:  NT Sitting EOB:  SBA  Dynamic Standing:  modA front [employer] Sitting EOB:  Independent  Dynamic Standing:  ModA with AAD     Pt is A & O x difficult to assess d/t aphasia. Pt follows commands well approximately 75% of the time. Occasional difficult executing tasks when prompted. Sensation:  Pt denies numbness and tingling to extremities  Edema:  unremarkable      Patient education  Pt educated on role of PT intervention.   Pt educated on safety in room with utilization of call light for assistance with mobility. Pt educated on importance of maximizing OOB time by transferring to bedside chair for meals and ambulating to bathroom/transferring to bedside commode with assistance from nursing and therapy staff to increase functional activity tolerance and overall functional independence. Patient response to education:   Pt verbalized understanding Pt demonstrated skill Pt requires further education in this area   yes yes yes     ASSESSMENT:    Comments:  RN cleared pt for activity prior to session. Pt received supine in bed and agreeable to PT intervention at this time. Pt performed all functional mobility as noted above. Mobility much improved today. Communication limited by aphasia but pt following commands well most of the time. Completed multiple STS repetitions and short bouts of ambulation in room. Pt transferred to bedside chair at end of session and left with all needs met and call light in reach. Pt requires continued skilled PT intervention for the purposes of maximizing functional mobility and independence by addressing deficits described above. Treatment:  Patient practiced and was instructed in the following treatment:     Therapeutic Activities Completed:  o Functional mobility as noted above:   - Bed mobility: as noted above. Max VC and hand over hand guidance to facilitate efficient use of BUE on bed rail to promote more independent completion of task. Pt sat at EOB x 5 minutes at SBA level. Practiced scooting forwards and laterally with BUE at [address] level. Cues throughout for proper sequencing.   - Transfer training: sit<>stand x 5 reps from EOB min/modA. Max VC for proper hand placement and sequencing. Stand pivot to bedside chair front [employer] Jayy. Max VC for proper sequencing for safety and more independent completion of task with [employer].  - Ambulation: side steps at EOB front [employer] Jayy 5 feet R and L.   Pt then ambulated 15 feet front [employer] modA x 2 reps. Max VC throughout for proper sequencing and use of [employer]. Pt with forward flexed posture and shuffling gait.  o Skilled repositioning in seated position for comfort.  o Pt education as noted above. PLAN:    Patient is making fair progress towards established goals. Will continue with current POC.       Time in  0930  Time out  0954    Total Treatment Time  24 minutes     CPT codes:  [] Gait training 35456 0 minutes  [] Manual therapy 90671 0 minutes  [x] Therapeutic activities 85183 24 minutes  [] Therapeutic exercises 82766 0 minutes  [] Neuromuscular reeducation 61789 0 minutes    Livier Lunch, PT, DPT  [id number]

## 2021-08-31 NOTE — DISCHARGE SUMMARY
Physician Discharge Summary     Patient ID:  Dhiraj Espino  [id number]  77 y.o.  1965    Admit date: 8/21/2021    Discharge date and time:  8/31/2021     Admission Diagnoses:    Glioblastoma [facility])     Discharge Diagnoses:   Principal Problem:    Glioblastoma ([facility] [address]  Active Problems:    Brain mass    Brain bleed ([facility] [address]    Essential hypertension    Depression    Leukocytosis    AMS (altered mental status)    Morbid obesity with BMI of 50.0-59.9, adult (HCC)    Hyponatremia    Paroxysmal atrial fibrillation (HCC)    Liver lesion - INCIDENTAL  Resolved Problems:    * No resolved hospital problems. *       Consults: Neurosurgery, critical care, palliative care, rad onc    Procedures: Frameless stereotactic-guided left temporoparietal  craniotomy for biopsy of brain mass. Hospital Course:   Patient presented with altered mental status and unfortunately was found to have a large left-sided brain mass. She underwent urgent biopsy. Her postoperative course was complicated by atrial fibrillation with rapid ventricular response. This was treated with beta-blockers. She has moderate to severe aphasia that waxes and wanes. She has fairly significant right upper extremity weakness. Ultimately her pathology came back as GBM. She continues to have waxing waning mental status changes and a repeat CT scan showed some increased edema. Her baseline health is also very poor. Ultimately she and her family decided on hospice.      Discharge Exam:  Vitals:    08/30/21 2045 08/30/21 2245 08/31/21 0300 08/31/21 0815   BP: (!) 168/96 (!) 152/86 (!) 151/89 (!) 150/80   Pulse: 78 72 66 80   Resp: 18  18    Temp: 98.2 °F (36.8 °C)  98.2 °F (36.8 °C) 95.2 °F (35.1 °C)   TempSrc: Oral  Temporal Temporal   SpO2: 95%  96% 92%   Weight:       Height:            General appearance: Nontoxic, morbidly obese  HEENT: dressing on head, MMM  Neck: FROM, supple  Lungs: Clear to auscultation anteriorly, WOB normal  CV: RRR, no MRGs  Abdomen: Soft, non-tender; no masses or HSM, +BS  Extremities: No peripheral edema or digital cyanosis  Skin: no rash, lesions or ulcers  Psych: Calm and cooperative  Neuro: Alert and interactive, face symmetric, moderately aphasic. R handgrip weaker than other muscle groups, but can move all extremities weakly    Condition:  End of life    Disposition: home with hospice    Patient Instructions:   Current Discharge Medication List      START taking these medications    Details   levETIRAcetam (KEPPRA) 500 MG tablet Take 1.5 tablets by mouth 2 times daily  Qty: 90 tablet, Refills: 1      dexamethasone (DECADRON) 4 MG tablet Take 1 tablet by mouth 4 times daily for 14 days  Qty: 56 tablet, Refills: 1      pantoprazole (PROTONIX) 40 MG tablet Take 1 tablet by mouth every morning (before breakfast)  Qty: 30 tablet, Refills: 1      carvedilol (COREG) 6.25 MG tablet Take 1 tablet by mouth 2 times daily (with meals)  Qty: 60 tablet, Refills: 1         CONTINUE these medications which have NOT CHANGED    Details   hydrOXYzine (ATARAX) 10 MG tablet       vitamin B-12 (CYANOCOBALAMIN) 100 MCG tablet Take 50 mcg by mouth daily      vitamin D (CHOLECALCIFEROL) 1000 UNIT TABS tablet Take 1,000 Units by mouth daily      melatonin 3 MG TABS tablet Take 3 mg by mouth daily      PARoxetine (PAXIL) 30 MG tablet Take 40 mg by mouth every morning      lithium 300 MG tablet Take 300 mg by mouth 4 times daily      albuterol (PROVENTIL) (2.5 MG/3ML) 0.083% nebulizer solution Take 2.5 mg by nebulization every 6 hours as needed for Wheezing         STOP taking these medications       amLODIPine (NORVASC) 5 MG tablet Comments:   Reason for Stopping:                  Activity: activity as tolerated  Diet: regular diet    Follow-up with PCP in 1 week.     Note that over 30 minutes was spent in preparing discharge papers, discussing discharge with patient, medication review, etc.    Signed:  Genie Rg MD    8/31/2021  11:18 AM

## 2021-08-31 NOTE — PROGRESS NOTES
Discharge instructions and medications reviewed with patient and her spouse and all questions answered.  Ambulance pickup time is 5pm.

## 2021-08-31 NOTE — PROGRESS NOTES
SPEECH LANGUAGE PATHOLOGY  DAILY PROGRESS NOTE        PATIENT NAME:  Ana Maria Lowery      :  1965          TODAY'S DATE:  2021 ROOM:  [address] [place] [address]    Attempted ongoing speech therapy. Pt remains with fluent aphasia, improved production of true words however unrelated/ inappropriate to questions presented. Pt unable to repeat today or complete auto speech tasks. Perseverating on \"going to my moms\". Session discontinued as unable to redirection.        CPT code(s) K0481350  speech/language tx  Total minutes :  15 minutes

## 2021-08-31 NOTE — PROGRESS NOTES
Patient is increasingly confused this morning, in tears and asking for her mother and grandmother. This RN called patient's  to speak with her. She was appreciative.

## 2021-08-31 NOTE — CARE COORDINATION
MADELIN Sesay informed that Pt will be transported home this evening at 5 pm by [facility]. Confirmed with CHELO-I-ALICE Abebe that Pt was discharging this evening. Jon Abebe stated that family was just finishing up preparing for the delivery of equipment. Discharge Plan is to return home with [facility]. SARAH/HINA to follow for discharge needs.    Tomy Lu, L.S.W.  [phone]

## 2021-08-31 NOTE — PROGRESS NOTES
Follow up call this morning with GURPREET Gallo. Tamara Gallo stated family has decided to move forward with hospice care at home. Patient will need DME placed this afternoon. Tamara Gallo requesting patient discharge home via ambulance. Address verified. 4 steps to enter the home. Family would like [facility] medical director to follow. Answered all questions relating to hospice care in the home. Asked Tamara Gallo to please call once patient arrives home and a nurse will make a visit. Tamara Gallo verbalized understanding. Emotional support and active listening provided. [address] DME a face sheet and order form for a hospital bed and bedpan. [facility] placed to [facility] and will  at 5pm. Provided patient and billing information. Call placed to [facility] medical director. Provided patient information. Call placed to [facility] intake department. Spoke with Júnior and provided discharge update. Visit made to unit. Spoke with charge nurse. Asked for a discharge order and scripts for discharge. Please send discharge instructions home with patient.  Updated SARAH Encinas.

## 2021-08-31 NOTE — PROGRESS NOTES
Palliative Medicine   Progression of Care     Patient's chart reviewed. Patient reviewed with [facility] liaison. No needs for Palliative Care. Palliative will sign off at this time. Thank you for including Palliative in the care of [facility].      Palliative Care was following closely for support of patient and family   Anita SWANSON-MALGORZATA, AGAP-BC

## 2021-08-31 NOTE — PROGRESS NOTES
Neurosurg progress note  VITALS:  BP (!) 151/89   Pulse 66   Temp 98.2 °F (36.8 °C) (Temporal)   Resp 18   Ht 5' 4\" (1.626 m)   Wt (!) 334 lb 10.5 oz (151.8 kg)   SpO2 96%   BMI 57.44 kg/m²   24HR INTAKE/OUTPUT:    Intake/Output Summary (Last 24 hours) at 8/31/2021 0810  Last data filed at 8/30/2021 1519  Gross per 24 hour   Intake 0 ml   Output    Net 0 ml     CT HEAD WO CONTRAST    Addendum Date: 8/30/2021    ADDENDUM: Findings were discussed with Kamar Or at 11:39 am on 8/30/2021. Result Date: 8/30/2021  EXAMINATION: CT OF THE HEAD WITHOUT CONTRAST  8/30/2021 7:18 am TECHNIQUE: CT of the head was performed without the administration of intravenous contrast. Dose modulation, iterative reconstruction, and/or weight based adjustment of the mA/kV was utilized to reduce the radiation dose to as low as reasonably achievable. COMPARISON: Head CT dated 08/22/2021 and 08/24/2021 HISTORY: ORDERING SYSTEM PROVIDED HISTORY: follow up GBM TECHNOLOGIST PROVIDED HISTORY: Reason for exam:->follow up GBM Has a \"code stroke\" or \"stroke alert\" been called? ->No What reading provider will be dictating this exam?->CRC FINDINGS: BRAIN/VENTRICLES: There is a left frontoparietal craniotomy defect. Extensive postsurgical changes are seen from prior resection of cystic mass. The gas fluid level seen previously in the left temporal lobe is not visualized at this time. Heterogeneous hypodensity is seen in the surgical bed, consistent with postsurgical change. There is slightly increased mass effect and edema in the left cerebral hemisphere compared to the most recent prior study. There is compression of the left lateral ventricle with slightly increased midline shift to the right measuring 6 mm (3 mm on the prior study). There is increased hypodensity extending posteriorly in the left parietal lobe that has a well-demarcated medial margin.   There is loss of the gray-white interface and the location suggests vascular distribution in the posterior left middle cerebral artery territory. This does suggest the possibility of a subacute infarct superimposed on postsurgical changes. There is no evidence of acute intracranial hemorrhage. No acute infarct is seen on the right side and there is no evidence of hydrocephalus. ORBITS: The visualized portion of the orbits demonstrate no acute abnormality. SINUSES: The visualized paranasal sinuses and mastoid air cells demonstrate no acute abnormality. SOFT TISSUES/SKULL:  No acute abnormality of the visualized skull or soft tissues. 1. Extensive postsurgical changes from prior resection of cystic left temporal mass. 2. Slightly increased mass effect and edema with increased midline shift to the right measuring 6 mm. 3. Increased hypodensity in the left posterior parietal lobe with is suggestion of of cytotoxic edema in the left posterior middle cerebral artery territory. The possibility of subacute infarct must be considered. Correlation with MRI of the brain is suggested, if clinically feasible. 4.  The findings were sent to the Radiology Results [id number] at 11:35 am on 8/30/2021to be communicated to a licensed caregiver.      CBC:   Lab Results   Component Value Date    WBC 14.8 08/31/2021    RBC 5.26 08/31/2021    RBC 4.68 04/26/2017    HGB 13.9 08/31/2021    HCT 43.6 08/31/2021    MCV 82.9 08/31/2021    MCH 26.4 08/31/2021    MCHC 31.9 08/31/2021    RDW 14.7 08/31/2021     08/31/2021    MPV 12.3 08/31/2021     BMP:    Lab Results   Component Value Date     08/30/2021    K 4.8 08/30/2021    K 4.7 08/21/2021     08/30/2021    CO2 24 08/30/2021    BUN 28 08/30/2021    LABALBU 3.5 08/28/2021    CREATININE 0.6 08/30/2021    CALCIUM 9.6 08/30/2021    GFRAA >60 08/30/2021    LABGLOM >60 08/30/2021    GLUCOSE 190 08/30/2021      carvedilol  6.25 mg Oral BID WC    neomycin-bacitracin-polymyxin   Topical BID    lansoprazole  15 mg Oral QAM AC    levETIRAcetam  750 mg Oral BID    Or    levetiracetam  750 mg IntraVENous BID    insulin lispro  0-18 Units SubCUTAneous TID WC    insulin lispro  0-9 Units SubCUTAneous Nightly    scopolamine  1 patch Transdermal Q72H    sodium chloride flush  10 mL IntraVENous Once    sodium chloride flush  5-40 mL IntraVENous 2 times per day    amLODIPine  5 mg Oral Daily    lithium  300 mg Oral 4x Daily    PARoxetine  40 mg Oral QAM    dexamethasone  4 mg IntraVENous Q6H     Sitting upright in bed opens eyes to voice follows commands   Assessment:  Patient Active Problem List   Diagnosis    Morbid obesity ([facility] [address]    Brain mass    Brain bleed ([facility] [address]    Essential hypertension    Depression    Leukocytosis    AMS (altered mental status)    Morbid obesity with BMI of 50.0-59.9, adult (HCC)    Hyponatremia    Paroxysmal atrial fibrillation (HCC)    Liver lesion - INCIDENTAL    Glioblastoma (HCC)     Plan:Poor prognosis DNC and hospice per family wishes   Mj Oro MD M.D.

## 2021-09-01 ENCOUNTER — CARE COORDINATION (OUTPATIENT)
Dept: CARE COORDINATION | Age: 56
End: 2021-09-01

## 2021-09-01 NOTE — CARE COORDINATION
spoke to Jade Barrios at [address] [place] [address]  who states patient started care on 8/31/21.

## (undated) DEVICE — GOWN,SIRUS,FABRNF,2XL,18/CS: Brand: MEDLINE

## (undated) DEVICE — PERFORATOR CRAN AD PED 14/11MM DGR O ROUNDED CUT EDGE DISP

## (undated) DEVICE — SYRINGE 20ML LL S/C 50

## (undated) DEVICE — APPLICATOR PREP 26ML 0.7% IOD POVACRYLEX 74% ISO ALC ST

## (undated) DEVICE — 2.3MM TAPERED ROUTER

## (undated) DEVICE — SURGICAL PROCEDURE PACK CRANIOTOMY CUST

## (undated) DEVICE — CUSA® EXCEL 36 KHZ CEM™ NOSECONE: Brand: CUSA® EXCEL

## (undated) DEVICE — MAYFIELD® DISPOSABLE ADULT SKULL PIN (PLASTIC BASE): Brand: MAYFIELD®

## (undated) DEVICE — CUSA® EXCEL 36KHZ 1.98MM CURVED EXTENDED STANDARD TIP: Brand: CUSA® EXCEL

## (undated) DEVICE — CODMAN® SURGICAL PATTIES 1/2" X 1" (1.27CM X 2.54CM): Brand: CODMAN®

## (undated) DEVICE — TUBING, SUCTION, 3/16" X 12', STRAIGHT: Brand: MEDLINE

## (undated) DEVICE — SYRINGE,EAR/ULCER, 3 OZ, STERILE: Brand: MEDLINE

## (undated) DEVICE — CUSA® EXCEL 36KHZ CUSA® MANIFOLD TUBING SET: Brand: CUSA® EXCEL

## (undated) DEVICE — DRAPE 24X23IN RADIOLOGY CASS ADHES STRIP

## (undated) DEVICE — Device

## (undated) DEVICE — AGENT HEMSTAT W4XL8IN OXIDIZED REGENERATED CELOS ABSRB

## (undated) DEVICE — BLADE CLP TAPR HD WET DRY CAPABILITY GTT IN CHARGING USE

## (undated) DEVICE — SOLUTION IRRIG 1000ML STRL H2O USP PLAS POUR BTL

## (undated) DEVICE — 1.5L THIN WALL CAN: Brand: CRD

## (undated) DEVICE — CODMAN® SURGICAL PATTIES 1" X 1" (2.54CM X 2.54CM): Brand: CODMAN®

## (undated) DEVICE — GOWN,SIRUS,FABRNF,L,20/CS: Brand: MEDLINE

## (undated) DEVICE — CORD BPLR FTSWCH STRL DISP SIL W/ IRR TBNG

## (undated) DEVICE — PAD,NON-ADHERENT,3X8,STERILE,LF,1/PK: Brand: MEDLINE

## (undated) DEVICE — DRAPE TAPE: Brand: CONVERTORS

## (undated) DEVICE — RANEY SCALP CLIP STERILE: Brand: AESCULAP

## (undated) DEVICE — POST CRANIOTOMY SUBDURAL PRESSURE MONITORING KIT: Brand: CAMINO®

## (undated) DEVICE — LABEL MED 4 IN SURG PANEL W/ PEN STRL

## (undated) DEVICE — INTENDED FOR TISSUE SEPARATION, AND OTHER PROCEDURES THAT REQUIRE A SHARP SURGICAL BLADE TO PUNCTURE OR CUT.: Brand: BARD-PARKER ® STAINLESS STEEL BLADES

## (undated) DEVICE — DOUBLE BASIN SET: Brand: MEDLINE INDUSTRIES, INC.

## (undated) DEVICE — GLOVE SURG 8.5 PF POLYMER WHT STRL SIGN LTX ESSENTIAL LTX

## (undated) DEVICE — LOCATOR RAD PASS SPHR MRK NAVIGATION BALL DISP STLTH STN

## (undated) DEVICE — WRENCH TORQ 36KHZ ULTRASONIC DISP FOR C5636 TORQ BASE CUSA

## (undated) DEVICE — CODMAN® SURGICAL PATTIES 1/2" X 1/2" (1.27CM X 1.27CM): Brand: CODMAN®

## (undated) DEVICE — STANDARD HYPODERMIC NEEDLE,ALUMINUM HUB: Brand: MONOJECT